# Patient Record
Sex: FEMALE | Race: WHITE | NOT HISPANIC OR LATINO | Employment: FULL TIME | URBAN - METROPOLITAN AREA
[De-identification: names, ages, dates, MRNs, and addresses within clinical notes are randomized per-mention and may not be internally consistent; named-entity substitution may affect disease eponyms.]

---

## 2017-01-19 RX ORDER — RANITIDINE 150 MG/1
CAPSULE ORAL
Qty: 180 CAPSULE | Refills: 1 | Status: SHIPPED | OUTPATIENT
Start: 2017-01-19 | End: 2020-03-04

## 2017-02-23 RX ORDER — DICLOFENAC SODIUM 75 MG/1
TABLET, DELAYED RELEASE ORAL
Qty: 90 TABLET | Refills: 0 | Status: SHIPPED | OUTPATIENT
Start: 2017-02-23 | End: 2017-04-26 | Stop reason: SDUPTHER

## 2017-03-15 RX ORDER — CYCLOBENZAPRINE HCL 10 MG
TABLET ORAL
Qty: 270 TABLET | Refills: 1 | Status: SHIPPED | OUTPATIENT
Start: 2017-03-15 | End: 2019-06-20

## 2017-03-17 RX ORDER — BUPROPION HYDROCHLORIDE 300 MG/1
TABLET ORAL
Qty: 90 TABLET | Refills: 1 | Status: SHIPPED | OUTPATIENT
Start: 2017-03-17 | End: 2019-12-11 | Stop reason: SDUPTHER

## 2017-04-19 RX ORDER — DICLOFENAC SODIUM 75 MG/1
TABLET, DELAYED RELEASE ORAL
Qty: 90 TABLET | Refills: 0 | OUTPATIENT
Start: 2017-04-19

## 2017-04-26 RX ORDER — DICLOFENAC SODIUM 75 MG/1
TABLET, DELAYED RELEASE ORAL
Qty: 90 TABLET | Refills: 0 | Status: SHIPPED | OUTPATIENT
Start: 2017-04-26 | End: 2019-07-10

## 2017-06-01 RX ORDER — CYCLOBENZAPRINE HCL 10 MG
TABLET ORAL
Qty: 270 TABLET | Refills: 1 | OUTPATIENT
Start: 2017-06-01

## 2017-06-01 RX ORDER — BUPROPION HYDROCHLORIDE 300 MG/1
TABLET ORAL
Qty: 90 TABLET | Refills: 1 | OUTPATIENT
Start: 2017-06-01

## 2017-06-02 RX ORDER — BUPROPION HYDROCHLORIDE 300 MG/1
TABLET ORAL
Qty: 90 TABLET | Refills: 1 | OUTPATIENT
Start: 2017-06-02

## 2017-06-06 RX ORDER — RANITIDINE 150 MG/1
CAPSULE ORAL
Qty: 180 CAPSULE | Refills: 1 | OUTPATIENT
Start: 2017-06-06

## 2017-06-07 ENCOUNTER — HOSPITAL ENCOUNTER (OUTPATIENT)
Dept: CARDIOLOGY | Facility: HOSPITAL | Age: 67
Discharge: HOME | End: 2017-06-07
Attending: PSYCHIATRY & NEUROLOGY | Admitting: PSYCHIATRY & NEUROLOGY

## 2017-06-07 LAB
ALBUMIN SERPL-MCNC: 3.6 G/DL (ref 3.5–4.8)
ALBUMIN/GLOB SERPL: 1.1 {RATIO} (ref 1–1.7)
ALP SERPL-CCNC: 70 IU/L (ref 32–91)
ALT SERPL-CCNC: 22 IU/L (ref 14–54)
ANION GAP SERPL CALC-SCNC: 15.5 MMOL/L (ref 10–20)
AST SERPL-CCNC: 30 IU/L (ref 15–41)
BASOPHILS # BLD AUTO: 0 10*3/UL (ref 0–0.2)
BASOPHILS NFR BLD AUTO: 1 % (ref 0–2)
BILIRUB SERPL-MCNC: 0.4 MG/DL (ref 0.3–1.2)
BUN SERPL-MCNC: 16 MG/DL (ref 8–20)
BUN/CREAT SERPL: 13.3 (ref 5.4–26.2)
CALCIUM SERPL-MCNC: 9.1 MG/DL (ref 8.9–10.3)
CHLORIDE SERPL-SCNC: 111 MMOL/L (ref 101–111)
CONV CO2: 20 MMOL/L (ref 22–32)
CONV TOTAL PROTEIN: 6.8 G/DL (ref 6.1–7.9)
CREAT UR-MCNC: 1.2 MG/DL (ref 0.4–1)
DIFFERENTIAL METHOD BLD: (no result)
EOSINOPHIL # BLD AUTO: 0.1 10*3/UL (ref 0–0.3)
EOSINOPHIL # BLD AUTO: 1 % (ref 0–3)
ERYTHROCYTE [DISTWIDTH] IN BLOOD BY AUTOMATED COUNT: 14.8 % (ref 11.5–14.5)
ERYTHROCYTE [SEDIMENTATION RATE] IN BLOOD BY WESTERGREN METHOD: 16 MM/HR (ref 0–30)
GLOBULIN UR ELPH-MCNC: 3.2 G/DL (ref 2.5–3.8)
GLUCOSE SERPL-MCNC: 99 MG/DL (ref 65–99)
HCT VFR BLD AUTO: 42.1 % (ref 35–49)
HGB BLD-MCNC: 14.1 G/DL (ref 12–15)
LYMPHOCYTES # BLD AUTO: 2.8 10*3/UL (ref 0.8–4.8)
LYMPHOCYTES NFR BLD AUTO: 29 % (ref 18–42)
MCH RBC QN AUTO: 30.1 PG (ref 26–32)
MCHC RBC AUTO-ENTMCNC: 33.4 G/DL (ref 32–36)
MCV RBC AUTO: 90.2 FL (ref 80–94)
MONOCYTES # BLD AUTO: 1.1 10*3/UL (ref 0.1–1.3)
MONOCYTES NFR BLD AUTO: 12 % (ref 2–11)
NEUTROPHILS # BLD AUTO: 5.8 10*3/UL (ref 2.3–8.6)
NEUTROPHILS NFR BLD AUTO: 57 % (ref 50–75)
NRBC BLD AUTO-RTO: 0 /100{WBCS}
NRBC/RBC NFR BLD MANUAL: 0 10*3/UL
PLATELET # BLD AUTO: 271 10*3/UL (ref 150–450)
PMV BLD AUTO: 10.8 FL (ref 7.4–10.4)
POTASSIUM SERPL-SCNC: 3.5 MMOL/L (ref 3.6–5.1)
RBC # BLD AUTO: 4.67 10*6/UL (ref 4–5.4)
SODIUM SERPL-SCNC: 143 MMOL/L (ref 136–144)
T4 FREE SERPL-MCNC: 0.98 NG/DL (ref 0.58–1.64)
TSH SERPL-ACNC: 1.6 UIU/ML (ref 0.34–5.6)
VIT B12 SERPL-MCNC: 190 PG/ML (ref 180–914)
WBC # BLD AUTO: 9.9 10*3/UL (ref 4.5–11.5)

## 2017-06-09 LAB
ANA SER QL IA: NORMAL

## 2017-06-14 ENCOUNTER — HOSPITAL ENCOUNTER (OUTPATIENT)
Dept: FAMILY MEDICINE CLINIC | Facility: CLINIC | Age: 67
Setting detail: SPECIMEN
Discharge: HOME | End: 2017-06-14
Attending: FAMILY MEDICINE | Admitting: FAMILY MEDICINE

## 2017-06-14 LAB — POTASSIUM SERPL-SCNC: 3.9 MMOL/L (ref 3.6–5.1)

## 2017-06-20 ENCOUNTER — HOSPITAL ENCOUNTER (OUTPATIENT)
Dept: RESPIRATORY THERAPY | Facility: HOSPITAL | Age: 67
Discharge: HOME | End: 2017-06-20
Attending: FAMILY MEDICINE | Admitting: FAMILY MEDICINE

## 2017-08-17 ENCOUNTER — HOSPITAL ENCOUNTER (OUTPATIENT)
Dept: FAMILY MEDICINE CLINIC | Facility: CLINIC | Age: 67
Setting detail: SPECIMEN
Discharge: HOME | End: 2017-08-17
Attending: FAMILY MEDICINE | Admitting: FAMILY MEDICINE

## 2017-08-17 LAB
ALBUMIN SERPL-MCNC: 4 G/DL (ref 3.5–4.8)
ALBUMIN/GLOB SERPL: 1 {RATIO} (ref 1–1.7)
ALP SERPL-CCNC: 126 IU/L (ref 32–91)
ALT SERPL-CCNC: 33 IU/L (ref 14–54)
ANION GAP SERPL CALC-SCNC: 17 MMOL/L (ref 10–20)
AST SERPL-CCNC: 33 IU/L (ref 15–41)
BASOPHILS # BLD AUTO: 0 10*3/UL (ref 0–0.2)
BASOPHILS NFR BLD AUTO: 0 % (ref 0–2)
BILIRUB SERPL-MCNC: 0.7 MG/DL (ref 0.3–1.2)
BUN SERPL-MCNC: 19 MG/DL (ref 8–20)
BUN/CREAT SERPL: 17.3 (ref 5.4–26.2)
CALCIUM SERPL-MCNC: 9.4 MG/DL (ref 8.9–10.3)
CHLORIDE SERPL-SCNC: 111 MMOL/L (ref 101–111)
CONV CO2: 19 MMOL/L (ref 22–32)
CONV SMEAR REVIEW: (no result)
CONV TOTAL PROTEIN: 7.9 G/DL (ref 6.1–7.9)
CREAT UR-MCNC: 1.1 MG/DL (ref 0.4–1)
DIFFERENTIAL METHOD BLD: (no result)
EOSINOPHIL # BLD AUTO: 0 % (ref 0–3)
EOSINOPHIL # BLD AUTO: 0 10*3/UL (ref 0–0.3)
ERYTHROCYTE [DISTWIDTH] IN BLOOD BY AUTOMATED COUNT: 15 % (ref 11.5–14.5)
GLOBULIN UR ELPH-MCNC: 3.9 G/DL (ref 2.5–3.8)
GLUCOSE SERPL-MCNC: 132 MG/DL (ref 65–99)
HCT VFR BLD AUTO: 43.3 % (ref 35–49)
HGB BLD-MCNC: 14 G/DL (ref 12–15)
LYMPHOCYTES # BLD AUTO: 1.4 10*3/UL (ref 0.8–4.8)
LYMPHOCYTES NFR BLD AUTO: 14 % (ref 18–42)
MCH RBC QN AUTO: 28.7 PG (ref 26–32)
MCHC RBC AUTO-ENTMCNC: 32.3 G/DL (ref 32–36)
MCV RBC AUTO: 88.7 FL (ref 80–94)
MONOCYTES # BLD AUTO: 0.4 10*3/UL (ref 0.1–1.3)
MONOCYTES NFR BLD AUTO: 4 % (ref 2–11)
NEUTROPHILS # BLD AUTO: 8 10*3/UL (ref 2.3–8.6)
NEUTROPHILS NFR BLD AUTO: 82 % (ref 50–75)
NRBC BLD AUTO-RTO: 0 /100{WBCS}
PLATELET # BLD AUTO: 332 10*3/UL (ref 150–450)
PMV BLD AUTO: 10.9 FL (ref 7.4–10.4)
POTASSIUM SERPL-SCNC: 4 MMOL/L (ref 3.6–5.1)
RBC # BLD AUTO: 4.88 10*6/UL (ref 4–5.4)
SODIUM SERPL-SCNC: 143 MMOL/L (ref 136–144)
WBC # BLD AUTO: 9.8 10*3/UL (ref 4.5–11.5)

## 2018-02-08 ENCOUNTER — HOSPITAL ENCOUNTER (OUTPATIENT)
Dept: FAMILY MEDICINE CLINIC | Facility: CLINIC | Age: 68
Setting detail: SPECIMEN
Discharge: HOME | End: 2018-02-08
Attending: FAMILY MEDICINE | Admitting: FAMILY MEDICINE

## 2018-02-08 LAB — URATE SERPL-MCNC: 5.7 MG/DL (ref 2.6–8)

## 2018-02-13 ENCOUNTER — HOSPITAL ENCOUNTER (OUTPATIENT)
Dept: PHYSICAL THERAPY | Facility: HOSPITAL | Age: 68
Setting detail: RECURRING SERIES
Discharge: HOME | End: 2018-04-17
Attending: ORTHOPAEDIC SURGERY | Admitting: ORTHOPAEDIC SURGERY

## 2018-03-22 ENCOUNTER — HOSPITAL ENCOUNTER (OUTPATIENT)
Dept: FAMILY MEDICINE CLINIC | Facility: CLINIC | Age: 68
Setting detail: SPECIMEN
Discharge: HOME | End: 2018-03-22
Attending: FAMILY MEDICINE | Admitting: FAMILY MEDICINE

## 2018-04-16 ENCOUNTER — HOSPITAL ENCOUNTER (OUTPATIENT)
Dept: FAMILY MEDICINE CLINIC | Facility: CLINIC | Age: 68
Setting detail: SPECIMEN
Discharge: HOME | End: 2018-04-16
Attending: FAMILY MEDICINE | Admitting: FAMILY MEDICINE

## 2018-04-16 LAB
ANION GAP SERPL CALC-SCNC: 11.8 MMOL/L (ref 10–20)
BUN SERPL-MCNC: 7 MG/DL (ref 8–20)
BUN/CREAT SERPL: 7.8 (ref 5.4–26.2)
CALCIUM SERPL-MCNC: 9 MG/DL (ref 8.9–10.3)
CHLORIDE SERPL-SCNC: 109 MMOL/L (ref 101–111)
CONV CO2: 21 MMOL/L (ref 22–32)
CREAT UR-MCNC: 0.9 MG/DL (ref 0.4–1)
GLUCOSE SERPL-MCNC: 76 MG/DL (ref 65–99)
POTASSIUM SERPL-SCNC: ABNORMAL MMOL/L (ref 3.6–5.1)
SODIUM SERPL-SCNC: 138 MMOL/L (ref 136–144)

## 2018-04-25 ENCOUNTER — HOSPITAL ENCOUNTER (OUTPATIENT)
Dept: OTHER | Facility: HOSPITAL | Age: 68
Setting detail: RECURRING SERIES
Discharge: HOME | End: 2018-04-29
Attending: UROLOGY | Admitting: UROLOGY

## 2018-04-30 ENCOUNTER — HOSPITAL ENCOUNTER (OUTPATIENT)
Dept: OTHER | Facility: HOSPITAL | Age: 68
Setting detail: RECURRING SERIES
Discharge: HOME | End: 2018-05-04
Attending: UROLOGY | Admitting: UROLOGY

## 2018-05-03 ENCOUNTER — HOSPITAL ENCOUNTER (OUTPATIENT)
Dept: CT IMAGING | Facility: HOSPITAL | Age: 68
Discharge: HOME | End: 2018-05-03
Attending: UROLOGY | Admitting: UROLOGY

## 2018-05-14 ENCOUNTER — HOSPITAL ENCOUNTER (OUTPATIENT)
Dept: PHYSICAL THERAPY | Facility: HOSPITAL | Age: 68
Setting detail: RECURRING SERIES
Discharge: HOME | End: 2018-07-12
Attending: FAMILY MEDICINE | Admitting: FAMILY MEDICINE

## 2018-11-14 ENCOUNTER — HOSPITAL ENCOUNTER (OUTPATIENT)
Dept: FAMILY MEDICINE CLINIC | Facility: CLINIC | Age: 68
Setting detail: SPECIMEN
Discharge: HOME | End: 2018-11-14
Attending: FAMILY MEDICINE | Admitting: FAMILY MEDICINE

## 2018-11-14 LAB
BASOPHILS # BLD AUTO: 0.1 10*3/UL (ref 0–0.2)
BASOPHILS NFR BLD AUTO: 1 % (ref 0–2)
DIFFERENTIAL METHOD BLD: (no result)
EOSINOPHIL # BLD AUTO: 0.2 10*3/UL (ref 0–0.3)
EOSINOPHIL # BLD AUTO: 2 % (ref 0–3)
ERYTHROCYTE [DISTWIDTH] IN BLOOD BY AUTOMATED COUNT: 13.5 % (ref 11.5–14.5)
HCT VFR BLD AUTO: 43 % (ref 35–49)
HGB BLD-MCNC: 14.3 G/DL (ref 12–15)
LYMPHOCYTES # BLD AUTO: 2.6 10*3/UL (ref 0.8–4.8)
LYMPHOCYTES NFR BLD AUTO: 25 % (ref 18–42)
MCH RBC QN AUTO: 30.3 PG (ref 26–32)
MCHC RBC AUTO-ENTMCNC: 33.1 G/DL (ref 32–36)
MCV RBC AUTO: 91.5 FL (ref 80–94)
MONOCYTES # BLD AUTO: 1.2 10*3/UL (ref 0.1–1.3)
MONOCYTES NFR BLD AUTO: 11 % (ref 2–11)
NEUTROPHILS # BLD AUTO: 6.6 10*3/UL (ref 2.3–8.6)
NEUTROPHILS NFR BLD AUTO: 61 % (ref 50–75)
NRBC BLD AUTO-RTO: 0 /100{WBCS}
NRBC/RBC NFR BLD MANUAL: 0 10*3/UL
PLATELET # BLD AUTO: 298 10*3/UL (ref 150–450)
PMV BLD AUTO: 10.1 FL (ref 7.4–10.4)
RBC # BLD AUTO: 4.7 10*6/UL (ref 4–5.4)
WBC # BLD AUTO: 10.7 10*3/UL (ref 4.5–11.5)

## 2019-03-12 ENCOUNTER — HOSPITAL ENCOUNTER (OUTPATIENT)
Dept: PHYSICAL THERAPY | Facility: HOSPITAL | Age: 69
Setting detail: RECURRING SERIES
Discharge: HOME | End: 2019-04-24
Attending: PODIATRIST | Admitting: PODIATRIST

## 2019-06-12 ENCOUNTER — HOSPITAL ENCOUNTER (OUTPATIENT)
Dept: FAMILY MEDICINE CLINIC | Facility: CLINIC | Age: 69
Setting detail: SPECIMEN
Discharge: HOME | End: 2019-06-12
Attending: FAMILY MEDICINE | Admitting: FAMILY MEDICINE

## 2019-06-12 ENCOUNTER — CONVERSION ENCOUNTER (OUTPATIENT)
Dept: FAMILY MEDICINE CLINIC | Facility: CLINIC | Age: 69
End: 2019-06-12

## 2019-06-13 LAB
25(OH)D3 SERPL-MCNC: 47 NG/ML (ref 30–100)
ALBUMIN SERPL-MCNC: 4.3 G/DL (ref 3.5–4.8)
ALBUMIN/GLOB SERPL: 1.4 {RATIO} (ref 1–1.7)
ALP SERPL-CCNC: 77 IU/L (ref 32–91)
ALT SERPL-CCNC: 17 IU/L (ref 14–54)
ANION GAP SERPL CALC-SCNC: 16.2 MMOL/L (ref 10–20)
AST SERPL-CCNC: 25 IU/L (ref 15–41)
BILIRUB SERPL-MCNC: 1.1 MG/DL (ref 0.3–1.2)
BILIRUB UR QL STRIP: NEGATIVE MG/DL
BUN SERPL-MCNC: 11 MG/DL (ref 8–20)
BUN/CREAT SERPL: 11 (ref 5.4–26.2)
CALCIUM SERPL-MCNC: 9.2 MG/DL (ref 8.9–10.3)
CASTS URNS QL MICRO: ABNORMAL /[LPF]
CHLORIDE SERPL-SCNC: 105 MMOL/L (ref 101–111)
CHOLEST SERPL-MCNC: 260 MG/DL
CHOLEST/HDLC SERPL: 3.6 {RATIO}
CK SERPL-CCNC: 139 IU/L (ref 38–234)
COLOR UR: YELLOW
CONV BACTERIA IN URINE MICRO: ABNORMAL
CONV CLARITY OF URINE: CLEAR
CONV CO2: 20 MMOL/L (ref 22–32)
CONV HYALINE CASTS IN URINE MICRO: 0 /[LPF] (ref 0–5)
CONV LDL CHOLESTEROL DIRECT: 185 MG/DL (ref 0–100)
CONV PROTEIN IN URINE BY AUTOMATED TEST STRIP: NEGATIVE MG/DL
CONV SMALL ROUND CELLS: ABNORMAL /[HPF]
CONV TOTAL PROTEIN: 7.3 G/DL (ref 6.1–7.9)
CONV UROBILINOGEN IN URINE BY AUTOMATED TEST STRIP: 0.2 MG/DL
CREAT UR-MCNC: 1 MG/DL (ref 0.4–1)
CULTURE INDICATED?: ABNORMAL
GLOBULIN UR ELPH-MCNC: 3 G/DL (ref 2.5–3.8)
GLUCOSE SERPL-MCNC: 79 MG/DL (ref 65–99)
GLUCOSE UR QL: NEGATIVE MG/DL
HDLC SERPL-MCNC: 72 MG/DL
HGB UR QL STRIP: NEGATIVE
KETONES UR QL STRIP: ABNORMAL MG/DL
LDLC/HDLC SERPL: 2.6 {RATIO}
LEUKOCYTE ESTERASE UR QL STRIP: ABNORMAL
LIPID INTERPRETATION: ABNORMAL
NITRITE UR QL STRIP: POSITIVE
PH UR STRIP.AUTO: 5.5 [PH] (ref 4.5–8)
POTASSIUM SERPL-SCNC: 4.2 MMOL/L (ref 3.6–5.1)
RBC #/AREA URNS HPF: 1 /[HPF] (ref 0–3)
SODIUM SERPL-SCNC: 137 MMOL/L (ref 136–144)
SP GR UR: 1.02 (ref 1–1.03)
SPERM URNS QL MICRO: ABNORMAL /[HPF]
SQUAMOUS SPT QL MICRO: 0 /[HPF] (ref 0–5)
TRIGL SERPL-MCNC: 43 MG/DL
TSH SERPL-ACNC: 0.96 UIU/ML (ref 0.34–5.6)
UNIDENT CRYS URNS QL MICRO: ABNORMAL /[HPF]
VLDLC SERPL CALC-MCNC: 3.2 MG/DL
WBC #/AREA URNS HPF: 2 /[HPF] (ref 0–5)
YEAST SPEC QL WET PREP: ABNORMAL /[HPF]

## 2019-06-14 LAB
AMPICILLIN SUSC ISLT: NORMAL
AZTREONAM SUSC ISLT: NORMAL
BACTERIA ISLT: NORMAL
BACTERIA SPEC AEROBE CULT: NORMAL
CEFAZOLIN SUSC ISLT: NORMAL
CEFEPIME SUSC ISLT: NORMAL
CEFTAZIDIME SUSC ISLT: NORMAL
CEFTRIAXONE SUSC ISLT: NORMAL
CIPROFLOXACIN SUSC ISLT: NORMAL
COLONY COUNT: NORMAL
ERTAPENEM SUSC ISLT: NORMAL
LEVOFLOXACIN SUSC ISLT: NORMAL
Lab: NORMAL
MEROPENEM SUSC ISLT: NORMAL
MICRO REPORT STATUS: NORMAL
NITROFURANTOIN SUSC ISLT: NORMAL
PIP+TAZO SUSC ISLT: NORMAL
SPECIMEN SOURCE: NORMAL
SUSC METH SPEC: NORMAL
TETRACYCLINE SUSC ISLT: NORMAL
TOBRAMYCIN SUSC ISLT: NORMAL
TRIMETHOPRIM/SULFA: NORMAL

## 2019-06-16 VITALS
HEIGHT: 63 IN | WEIGHT: 155.6 LBS | RESPIRATION RATE: 12 BRPM | BODY MASS INDEX: 27.57 KG/M2 | SYSTOLIC BLOOD PRESSURE: 106 MMHG | OXYGEN SATURATION: 99 % | HEART RATE: 78 BPM | DIASTOLIC BLOOD PRESSURE: 70 MMHG

## 2019-06-17 NOTE — PROGRESS NOTES
Vital Signs:    Patient Profile:    69 Years Old Female  Height:     63 inches  Weight:     155.6 pounds  BMI:        27.56     O2 Sat:     99 %  Temp:       98.2 degrees F oral  Pulse rate: 78 / minute  Resp:       12 per minute  BP Sittin / 70  (right arm)    Cuff size:  regular      Problems: Active problems were reviewed with the patient during this visit.  Medications: Medications were reviewed with the patient during this visit.  Allergies: Allergies were reviewed with the patient during this visit.        Vitals Entered By: Yaneth Maldonado CMA (2019 4:32 PM)      Referring Provider:  Sadiq Parker MD  Primary Provider:  Sadiq Parker MD    CC:  Muscle spasms.    History of Present Illness:  1) patient c/o muscle spasms in lower legs, goes into feet. Wakes up in middle of night several times. Spasms go into arches of feet, toes. Unable to sleep. (+) Severe. Taking K+ for 2 months without help. Drinks a lot of water.      Past Medical History:     Reviewed history from 2018 and no changes required:        Dermatologic Hx: herpes, (simplex)        GI Hx: gerd        Immunologic Hx: autoimmune disease        Musculoskeletal Hx: degenerative disc disease L/S spine        HEENT Hx: migraines        Cardiovascular Hx: hyperlipidemia        Musculoskeletal Hx: fibromyalgia        Respiratory Hx: allergies        Musculoskeletal Hx: carpal tunnel syndrome        Psychiatric Hx: depression,        C. Diff colitis- hospitalized         Obesity        Pulmonary hypertension-echo  with pressure 30-40- sleep test neg for SALVADOR                Dr Medina - gyn        Dr Lou- back surgeon    Past Surgical History:     Reviewed history from 2017 and no changes required:        cholecystectomy        tubal ligation bilateral        Colonoscopy- [facility]-         Epidurals x 3        L4-S1 fusion and laminotomy (2017)        L3-L4 fusion (2017)    Family History Summary:       Reviewed history Last on 2019 and no changes required:2019  Niece - Has FH Diabetes - Entered On: 2017  Nephew - Has FH Diabetes - Entered On: 2017  Daughter - Has FH Diabetes - Entered On: 2017  Father - Has FH Other Cancer - stomach - Entered On: 2017  Mother - Has FH Hypertension - Entered On: 2017  MGM - Has FH Heart Disease - Entered On: 2017  Mother - Has FH Breast Cancer - Entered On: 2017    General Comments - FH:  Mother -  age 83.  Also breast cancer  Father -  stomach cancer- age 65    Social History:     Reviewed history from 2018 and no changes required:        Patient has never smoked.        Passive Smoke: N        Alcohol Use: N        Drug Use: N        HIV/High Risk: N        Regular Exercise: Y        Hx Domestic Abuse: N        Amish Affecting Care: N                Risk Factors:     Smoked Tobacco Use:  Never smoker  Smokeless Tobacco Use:  Never  Passive smoke exposure:  no  Drug use:  no  HIV high-risk behavior:  no  Caffeine use:  2 drinks per day  Alcohol use:  no  Exercise:  yes     Times per week:  7     Type of Exercise:  walks 1.5 miles twice daily  Seatbelt use:  100 %  Sun Exposure:  frequently    Family History Risk Factors:     Family History of MI in females < 65 years old:  no     Family History of MI in males < 55 years old:  no    Previous Tobacco Use: Signed On 2019  Smoked Tobacco Use:  Never smoker  Smokeless Tobacco Use:  Never  Passive smoke exposure:  no  Drug use:  no  HIV high-risk behavior:  no  Caffeine use:  2 drinks per day    Previous Alcohol Use: Signed On 2019  Alcohol use:  no  Exercise:  yes     Times per week:  7     Type of Exercise:  walks 1.5 miles twice daily  Seatbelt use:  100 %  Sun Exposure:  frequently    Family History Risk Factors:     Family History of MI in females < 65 years old:  no     Family History of MI in males < 55 years old:  no    Mammogram History:      Date of Last Mammogram:  05/01/2015      Review of Systems          The patient complains of fatigue, back pain, joint pain and muscle cramps.  The patient denies fever, chills, diplopia, chest pain, palpitations, syncope, dyspnea on exertion, dyspnea at rest, abdominal pain, melena, hematochezia and jaundice.        Physical Exam   Height:  63  Weight:  158  BP:  106/70 mm HG    Medication List:  ZANAFLEX 4 MG ORAL TABLET (TIZANIDINE HCL) 1 po q hs  POTASSIUM CHLORIDE JARROD ER 20 MEQ ORAL TABLET EXTENDED RELEASE (POTASSIUM CHLORIDE JARROD ER) one twice daily  QUETIAPINE FUMARATE 50 MG TABLET (QUETIAPINE FUMARATE) TAKE 1 TABLET AT BEDTIME  AMBIEN 5 MG ORAL TABLET (ZOLPIDEM TARTRATE) TAKE ONE EVERY NIGHT AT BEDTIME AS NEEDED BY MOUTH  BUPROPION HCL  MG TABLET EXTENDED RELEASE 24 HOUR (BUPROPION HCL) TAKE 1 TABLET EVERY DAY  CALCIUM 600+D TABLET (CALCIUM CARB-CHOLECALCIFEROL TABS) Take 1 tablet by mouth daily  ASPIRIN 81 MG ORAL TABLET DELAYED RELEASE (ASPIRIN) take 1 tablet once daily      Surgical History   cholecystectomy  tubal ligation bilateral  Colonoscopy- [facility]- 2013  Epidurals x 3  L4-S1 fusion and laminotomy (6/26/2017)  L3-L4 fusion (8/2017),    Risk Factors  Tobacco Use: Never smoker  Passive smoke exposure: no  Exercise: yes  Exercise: 7 times per week  Type of Exercise: walks 1.5 miles twice daily  Illicit Drug use: no      Physical Examination   General Appearance   In no acute distress.  Alert & oriented.  Behavior and affect appropriate to situation  Neck   Supple.  No adenopathy  Cardiovascular   Regular rate and rhythm  Lungs   Clear to auscultation  Musculoskeletal   Gait and station normal, with adequate muscle strength and tone.  Normal ROM to neck, back and extremities  Neurologic   Cranial nerves 2-12 grossly intact.  DTRs normal and symmetric.  Extremity sensation normal and symmetrical to light touch  Psych   Alert and cooperative; normal mood and affect; normal attention span  and concentration        Impression & Recommendations:    Problem # 1:  MUSCLE CRAMPS (ICD-729.82) (FUA18-H42.2)  Assessment: Deteriorated    Orders:  [facility] CK TOTAL (CKT)  [facility] COMPREHENSIVE METABOLIC PANEL (CMP) (MPC)      Medications Added to Medication List This Visit:  1)  Zanaflex 4 Mg Oral Tablet (Tizanidine hcl) .... 1 po q hs      Patient Instructions:  1)  During this office visit we discussed the pertinent aspects of the visit and the treatment recommendations.  Patient was given the opportunity to ask questions and discuss other concerns.  2)  Check labs: CMP, CK.  3)  Zanaflex 4mg po qhs.  4)  Refilled Ambien.  5)  Consider Quinine.  6)  Increase water/stretching.  7)  follow up prn or next scheduedl appt     ...................................................................Flory Hyatt MA  June 14, 2019 9:05 AM                Medication Administration    Orders Added:  1)  Ofc Vst, Est Level III [17542]  2)  [facility] CK TOTAL [CKT]  3)  [facility] COMPREHENSIVE METABOLIC PANEL (CMP) [MPC]          Medications:  AMBIEN 5 MG ORAL TABLET (ZOLPIDEM TARTRATE) TAKE ONE EVERY NIGHT AT BEDTIME AS NEEDED BY MOUTH  #30 x 5      Entered and Authorized by:  Sadiq Parker MD      Electronically signed by:   Sadiq Parker MD on 06/12/2019      Method used:    Print then Give to Patient      RxID:   [id number]  ZANAFLEX 4 MG ORAL TABLET (TIZANIDINE HCL) 1 po q hs  #30[Tablet] x 5      Entered and Authorized by:  Sadiq Parker MD      Electronically signed by:   Sadiq Parker MD on 06/12/2019      Method used:    Print then Give to Patient      Note to Pharmacy: Route: ORAL;       RxID:   [id number]          Electronically signed by Sadiq Parker MD on 06/16/2019 at 8:29 PM  ________________________________________________________________________       Disclaimer: Converted Note message may not contain all data elements that existed in the legacy source system. Please see [facility] System for  the original note details.

## 2019-06-20 ENCOUNTER — TELEPHONE (OUTPATIENT)
Dept: FAMILY MEDICINE CLINIC | Facility: CLINIC | Age: 69
End: 2019-06-20

## 2019-06-20 RX ORDER — TIZANIDINE 4 MG/1
TABLET ORAL
Qty: 60 TABLET | Refills: 11 | Status: SHIPPED | OUTPATIENT
Start: 2019-06-20 | End: 2019-07-10

## 2019-07-10 ENCOUNTER — OFFICE VISIT (OUTPATIENT)
Dept: PODIATRY | Facility: CLINIC | Age: 69
End: 2019-07-10

## 2019-07-10 VITALS
BODY MASS INDEX: 27.82 KG/M2 | HEART RATE: 81 BPM | SYSTOLIC BLOOD PRESSURE: 119 MMHG | WEIGHT: 157 LBS | HEIGHT: 63 IN | DIASTOLIC BLOOD PRESSURE: 82 MMHG

## 2019-07-10 DIAGNOSIS — M72.2 PLANTAR FASCIITIS: Primary | ICD-10-CM

## 2019-07-10 PROCEDURE — 99213 OFFICE O/P EST LOW 20 MIN: CPT | Performed by: PODIATRIST

## 2019-07-10 RX ORDER — PSEUDOEPHEDRINE HCL 30 MG
100 TABLET ORAL
COMMUNITY
Start: 2017-08-25

## 2019-07-10 RX ORDER — BACLOFEN 10 MG/1
TABLET ORAL 3 TIMES DAILY
COMMUNITY
Start: 2017-06-15 | End: 2020-03-04 | Stop reason: SDUPTHER

## 2019-07-10 NOTE — PROGRESS NOTES
07/10/2019  Foot and Ankle Surgery - Established Patient/Follow-up  Provider: Dr. Reginald Ro, DAJUAN  Location: [facility]    Subjective:  Krystyna Fabian is a 69 y.o. female.     Chief Complaint   Patient presents with   • Right Foot - Follow-up       HPI: Patient returns for follow-up after recent steroid injection.  She states that she is doing much better.  She has much less discomfort to the heel and arch region.  She denies any other issues today.    Allergies   Allergen Reactions   • Butorphanol Itching     stadol   • Erythromycin Other (See Comments)     CAUSES HYPERTENSION         Current Outpatient Medications on File Prior to Visit   Medication Sig Dispense Refill   • baclofen (LIORESAL) 10 MG tablet 3 (Three) Times a Day.     • buPROPion XL (WELLBUTRIN XL) 300 MG 24 hr tablet TAKE 1 TABLET EVERY MORNING 90 tablet 1   • Cyanocobalamin (B-12 COMPLIANCE INJECTION) 1000 MCG/ML kit 1,000 mcg.     • docusate sodium 100 MG capsule Take 100 mg by mouth.     • QUEtiapine (SEROquel) 50 MG tablet Take 1 tablet by mouth every night. 90 tablet 3   • ranitidine (ZANTAC) 150 MG capsule TAKE 1 CAPSULE TWICE DAILY 180 capsule 1   • VITAMIN D, CHOLECALCIFEROL, PO Take 1 tablet by mouth Daily.     • zolpidem (AMBIEN) 5 MG tablet Take 1 tablet by mouth At Night As Needed for sleep. 30 tablet 0   • [DISCONTINUED] diclofenac (VOLTAREN) 75 MG EC tablet TAKE 1 TABLET EVERY DAY 90 tablet 0   • [DISCONTINUED] LYRICA 200 MG capsule Take 1 capsule by mouth 2 (Two) Times a Day. 60 capsule 0   • [DISCONTINUED] MIMVEY 1-0.5 MG per tablet TAKE 1 TABLET EVERY DAY 84 tablet 1   • [DISCONTINUED] nitrofurantoin, macrocrystal-monohydrate, (MACROBID) 100 MG capsule Take 1 capsule by mouth 2 (two) times a day. 14 capsule 0   • [DISCONTINUED] tiZANidine (ZANAFLEX) 4 MG tablet 2 po q hs 60 tablet 11     No current facility-administered medications on file prior to visit.        Objective   /82   Pulse 81   Ht 160 cm  "(63\")   Wt 71.2 kg (157 lb)   BMI 27.81 kg/m²     Podiatry Exam       General Appearance:  well nourished; well hydrated; no acute distress  Mental Status Exam        Judgement and Insight:  Intact       Orientation:  Oriented to time, place, and person  Cardiovascular (Right)       Dorsalis Pedis Pulse (Rt):  2/4       Posterior Tibialis Pulse (Rt):  2/4       Capillary Filling Time (Rt):  1-3 Seconds       Edema (Rt):  No Edema  Dermatological Exam       Temperature:  warm to warm       Skin Elasticity:  normal skin elasticity  Neurological Exam (Right)       Paresthesia (Rt):  negative       Achilles DTRs (Rt):  symmetric       Tinel over Tarsal Tunnel (Rt):  negative     MusculoSkeletal Exam (Right)       Gait and Stance (Rt):   mildly antalgic       ROM (Rt):   no pain or limitation with range of motion of the first metatarsophalangeal joint.  Other joints are unremarkable       Muscle Strength (Rt):  symmetrical 5/5; mild guarding       Subluxed Digits (Rt):  no subluxation or laxity of joints       Dislocated Joints (Rt):  no dislocation of joints       Inflammed Joints (Rt):  no inflammation of joints       Hammertoe Deformity (Rt):  none       Claw Toe Deformity (Rt):  none       Medial Turbicle Calc (Rt):   Mild discomfort with palpation       Gastroc soleus equinus (Rt):  Inability to dorsiflex past neutral position both straight legged and bent knee       Post tibial tendon (Rt):  no soreness noted       Peroneal Tendon (Rt):  no soreness noted       Capsulitis of the MPJs (Rt):  no soreness noted    Assessment/Plan     Krystyna was seen today for follow-up.    Diagnoses and all orders for this visit:    Plantar fasciitis      Patient has noticed improvement after last steroid injection.  I have asked that she continue to wear the arch supports and perform stretching exercises on a daily basis.  I would like her to monitor her symptoms closely and call with any increased pain or limitation.  I will see " her in 2 months for reevaluation.  If she continues to have prominent discomfort, may need to consider endoscopic plantar fasciotomy for definitive management.    No orders of the defined types were placed in this encounter.           Note is dictated utilizing voice recognition software. Unfortunately this leads to occasional typographical errors. I apologize in advance if the situation occurs. If questions occur please do not hesitate to call our office.

## 2019-08-28 DIAGNOSIS — F51.01 PRIMARY INSOMNIA: ICD-10-CM

## 2019-08-28 RX ORDER — QUETIAPINE FUMARATE 50 MG/1
TABLET, FILM COATED ORAL
Qty: 90 TABLET | Refills: 1 | Status: SHIPPED | OUTPATIENT
Start: 2019-08-28 | End: 2020-04-03

## 2019-08-28 RX ORDER — RANITIDINE 150 MG/1
TABLET ORAL
Qty: 180 TABLET | Refills: 3 | Status: SHIPPED | OUTPATIENT
Start: 2019-08-28 | End: 2020-03-04 | Stop reason: SDUPTHER

## 2019-12-07 DIAGNOSIS — F51.01 PRIMARY INSOMNIA: Primary | ICD-10-CM

## 2019-12-09 RX ORDER — ZOLPIDEM TARTRATE 5 MG/1
TABLET ORAL
Qty: 30 TABLET | Refills: 2 | Status: SHIPPED | OUTPATIENT
Start: 2019-12-09 | End: 2020-03-02 | Stop reason: SDUPTHER

## 2019-12-10 ENCOUNTER — TELEPHONE (OUTPATIENT)
Dept: FAMILY MEDICINE CLINIC | Facility: CLINIC | Age: 69
End: 2019-12-10

## 2019-12-12 RX ORDER — BUPROPION HYDROCHLORIDE 300 MG/1
TABLET ORAL
Qty: 90 TABLET | Refills: 0 | Status: SHIPPED | OUTPATIENT
Start: 2019-12-12 | End: 2020-04-03

## 2020-03-02 DIAGNOSIS — F51.01 PRIMARY INSOMNIA: ICD-10-CM

## 2020-03-02 RX ORDER — ZOLPIDEM TARTRATE 5 MG/1
5 TABLET ORAL NIGHTLY PRN
Qty: 30 TABLET | Refills: 0 | Status: SHIPPED | OUTPATIENT
Start: 2020-03-02 | End: 2020-03-04 | Stop reason: SDUPTHER

## 2020-03-04 ENCOUNTER — TELEPHONE (OUTPATIENT)
Dept: FAMILY MEDICINE CLINIC | Facility: CLINIC | Age: 70
End: 2020-03-04

## 2020-03-04 ENCOUNTER — HOSPITAL ENCOUNTER (OUTPATIENT)
Dept: CARDIOLOGY | Facility: HOSPITAL | Age: 70
Discharge: HOME | End: 2020-03-04
Admitting: FAMILY MEDICINE

## 2020-03-04 ENCOUNTER — OFFICE VISIT (OUTPATIENT)
Dept: FAMILY MEDICINE CLINIC | Facility: CLINIC | Age: 70
End: 2020-03-04

## 2020-03-04 VITALS
DIASTOLIC BLOOD PRESSURE: 77 MMHG | SYSTOLIC BLOOD PRESSURE: 115 MMHG | WEIGHT: 156 LBS | HEART RATE: 85 BPM | BODY MASS INDEX: 27.63 KG/M2 | RESPIRATION RATE: 12 BRPM

## 2020-03-04 DIAGNOSIS — J01.00 ACUTE NON-RECURRENT MAXILLARY SINUSITIS: ICD-10-CM

## 2020-03-04 DIAGNOSIS — F51.01 PRIMARY INSOMNIA: ICD-10-CM

## 2020-03-04 DIAGNOSIS — K21.9 LARYNGOPHARYNGEAL REFLUX: ICD-10-CM

## 2020-03-04 DIAGNOSIS — R60.9 EDEMA, UNSPECIFIED TYPE: ICD-10-CM

## 2020-03-04 DIAGNOSIS — Z12.31 BREAST CANCER SCREENING BY MAMMOGRAM: ICD-10-CM

## 2020-03-04 DIAGNOSIS — R60.9 EDEMA, UNSPECIFIED TYPE: Primary | ICD-10-CM

## 2020-03-04 DIAGNOSIS — J30.89 NON-SEASONAL ALLERGIC RHINITIS DUE TO OTHER ALLERGIC TRIGGER: ICD-10-CM

## 2020-03-04 DIAGNOSIS — R53.83 FATIGUE, UNSPECIFIED TYPE: ICD-10-CM

## 2020-03-04 LAB
ALBUMIN SERPL-MCNC: 4.3 G/DL (ref 3.5–5.2)
ALBUMIN/GLOB SERPL: 1.7 G/DL
ALP SERPL-CCNC: 72 U/L (ref 39–117)
ALT SERPL W P-5'-P-CCNC: 15 U/L (ref 1–33)
ANION GAP SERPL CALCULATED.3IONS-SCNC: 14.9 MMOL/L (ref 5–15)
AST SERPL-CCNC: 22 U/L (ref 1–32)
BACTERIA UR QL AUTO: ABNORMAL /HPF
BASOPHILS # BLD AUTO: 0.05 10*3/MM3 (ref 0–0.2)
BASOPHILS NFR BLD AUTO: 0.5 % (ref 0–1.5)
BH CV LOWER VASCULAR LEFT COMMON FEMORAL AUGMENT: NORMAL
BH CV LOWER VASCULAR LEFT COMMON FEMORAL COMPETENT: NORMAL
BH CV LOWER VASCULAR LEFT COMMON FEMORAL COMPRESS: NORMAL
BH CV LOWER VASCULAR LEFT COMMON FEMORAL PHASIC: NORMAL
BH CV LOWER VASCULAR LEFT COMMON FEMORAL SPONT: NORMAL
BH CV LOWER VASCULAR LEFT DISTAL FEMORAL COMPRESS: NORMAL
BH CV LOWER VASCULAR LEFT GASTRONEMIUS COMPRESS: NORMAL
BH CV LOWER VASCULAR LEFT GREATER SAPH AK COMPRESS: NORMAL
BH CV LOWER VASCULAR LEFT GREATER SAPH BK COMPRESS: NORMAL
BH CV LOWER VASCULAR LEFT LESSER SAPH COMPRESS: NORMAL
BH CV LOWER VASCULAR LEFT MID FEMORAL AUGMENT: NORMAL
BH CV LOWER VASCULAR LEFT MID FEMORAL COMPETENT: NORMAL
BH CV LOWER VASCULAR LEFT MID FEMORAL COMPRESS: NORMAL
BH CV LOWER VASCULAR LEFT MID FEMORAL PHASIC: NORMAL
BH CV LOWER VASCULAR LEFT MID FEMORAL SPONT: NORMAL
BH CV LOWER VASCULAR LEFT PERONEAL COMPRESS: NORMAL
BH CV LOWER VASCULAR LEFT POPLITEAL AUGMENT: NORMAL
BH CV LOWER VASCULAR LEFT POPLITEAL COMPETENT: NORMAL
BH CV LOWER VASCULAR LEFT POPLITEAL COMPRESS: NORMAL
BH CV LOWER VASCULAR LEFT POPLITEAL PHASIC: NORMAL
BH CV LOWER VASCULAR LEFT POPLITEAL SPONT: NORMAL
BH CV LOWER VASCULAR LEFT POSTERIOR TIBIAL COMPRESS: NORMAL
BH CV LOWER VASCULAR LEFT PROXIMAL FEMORAL COMPRESS: NORMAL
BH CV LOWER VASCULAR LEFT SAPHENOFEMORAL JUNCTION AUGMENT: NORMAL
BH CV LOWER VASCULAR LEFT SAPHENOFEMORAL JUNCTION COMPETENT: NORMAL
BH CV LOWER VASCULAR LEFT SAPHENOFEMORAL JUNCTION COMPRESS: NORMAL
BH CV LOWER VASCULAR LEFT SAPHENOFEMORAL JUNCTION PHASIC: NORMAL
BH CV LOWER VASCULAR LEFT SAPHENOFEMORAL JUNCTION SPONT: NORMAL
BH CV LOWER VASCULAR RIGHT COMMON FEMORAL AUGMENT: NORMAL
BH CV LOWER VASCULAR RIGHT COMMON FEMORAL COMPETENT: NORMAL
BH CV LOWER VASCULAR RIGHT COMMON FEMORAL COMPRESS: NORMAL
BH CV LOWER VASCULAR RIGHT COMMON FEMORAL PHASIC: NORMAL
BH CV LOWER VASCULAR RIGHT COMMON FEMORAL SPONT: NORMAL
BH CV LOWER VASCULAR RIGHT DISTAL FEMORAL COMPRESS: NORMAL
BH CV LOWER VASCULAR RIGHT GASTRONEMIUS COMPRESS: NORMAL
BH CV LOWER VASCULAR RIGHT GREATER SAPH AK COMPRESS: NORMAL
BH CV LOWER VASCULAR RIGHT GREATER SAPH BK COMPRESS: NORMAL
BH CV LOWER VASCULAR RIGHT LESSER SAPH COMPRESS: NORMAL
BH CV LOWER VASCULAR RIGHT MID FEMORAL AUGMENT: NORMAL
BH CV LOWER VASCULAR RIGHT MID FEMORAL COMPETENT: NORMAL
BH CV LOWER VASCULAR RIGHT MID FEMORAL COMPRESS: NORMAL
BH CV LOWER VASCULAR RIGHT MID FEMORAL PHASIC: NORMAL
BH CV LOWER VASCULAR RIGHT MID FEMORAL SPONT: NORMAL
BH CV LOWER VASCULAR RIGHT PERONEAL COMPRESS: NORMAL
BH CV LOWER VASCULAR RIGHT POPLITEAL AUGMENT: NORMAL
BH CV LOWER VASCULAR RIGHT POPLITEAL COMPETENT: NORMAL
BH CV LOWER VASCULAR RIGHT POPLITEAL COMPRESS: NORMAL
BH CV LOWER VASCULAR RIGHT POPLITEAL PHASIC: NORMAL
BH CV LOWER VASCULAR RIGHT POPLITEAL SPONT: NORMAL
BH CV LOWER VASCULAR RIGHT POSTERIOR TIBIAL COMPRESS: NORMAL
BH CV LOWER VASCULAR RIGHT PROXIMAL FEMORAL COMPRESS: NORMAL
BH CV LOWER VASCULAR RIGHT SAPHENOFEMORAL JUNCTION AUGMENT: NORMAL
BH CV LOWER VASCULAR RIGHT SAPHENOFEMORAL JUNCTION COMPETENT: NORMAL
BH CV LOWER VASCULAR RIGHT SAPHENOFEMORAL JUNCTION COMPRESS: NORMAL
BH CV LOWER VASCULAR RIGHT SAPHENOFEMORAL JUNCTION PHASIC: NORMAL
BH CV LOWER VASCULAR RIGHT SAPHENOFEMORAL JUNCTION SPONT: NORMAL
BILIRUB SERPL-MCNC: 0.3 MG/DL (ref 0.2–1.2)
BILIRUB UR QL STRIP: NEGATIVE
BUN BLD-MCNC: 6 MG/DL (ref 8–23)
BUN/CREAT SERPL: 7.2 (ref 7–25)
CALCIUM SPEC-SCNC: 9.4 MG/DL (ref 8.6–10.5)
CHLORIDE SERPL-SCNC: 104 MMOL/L (ref 98–107)
CLARITY UR: ABNORMAL
CO2 SERPL-SCNC: 25.1 MMOL/L (ref 22–29)
COLOR UR: YELLOW
CREAT BLD-MCNC: 0.83 MG/DL (ref 0.57–1)
DEPRECATED RDW RBC AUTO: 42.1 FL (ref 37–54)
EOSINOPHIL # BLD AUTO: 0.09 10*3/MM3 (ref 0–0.4)
EOSINOPHIL NFR BLD AUTO: 0.9 % (ref 0.3–6.2)
ERYTHROCYTE [DISTWIDTH] IN BLOOD BY AUTOMATED COUNT: 12.7 % (ref 12.3–15.4)
GFR SERPL CREATININE-BSD FRML MDRD: 68 ML/MIN/1.73
GLOBULIN UR ELPH-MCNC: 2.6 GM/DL
GLUCOSE BLD-MCNC: 82 MG/DL (ref 65–99)
GLUCOSE UR STRIP-MCNC: NEGATIVE MG/DL
HCT VFR BLD AUTO: 41.1 % (ref 34–46.6)
HGB BLD-MCNC: 13.4 G/DL (ref 12–15.9)
HGB UR QL STRIP.AUTO: ABNORMAL
HYALINE CASTS UR QL AUTO: ABNORMAL /LPF
IMM GRANULOCYTES # BLD AUTO: 0.06 10*3/MM3 (ref 0–0.05)
IMM GRANULOCYTES NFR BLD AUTO: 0.6 % (ref 0–0.5)
KETONES UR QL STRIP: NEGATIVE
LEUKOCYTE ESTERASE UR QL STRIP.AUTO: ABNORMAL
LYMPHOCYTES # BLD AUTO: 2.17 10*3/MM3 (ref 0.7–3.1)
LYMPHOCYTES NFR BLD AUTO: 22.6 % (ref 19.6–45.3)
MCH RBC QN AUTO: 29.7 PG (ref 26.6–33)
MCHC RBC AUTO-ENTMCNC: 32.6 G/DL (ref 31.5–35.7)
MCV RBC AUTO: 91.1 FL (ref 79–97)
MONOCYTES # BLD AUTO: 0.96 10*3/MM3 (ref 0.1–0.9)
MONOCYTES NFR BLD AUTO: 10 % (ref 5–12)
NEUTROPHILS # BLD AUTO: 6.29 10*3/MM3 (ref 1.7–7)
NEUTROPHILS NFR BLD AUTO: 65.4 % (ref 42.7–76)
NITRITE UR QL STRIP: NEGATIVE
NRBC BLD AUTO-RTO: 0 /100 WBC (ref 0–0.2)
PH UR STRIP.AUTO: 6.5 [PH] (ref 5–8)
PLATELET # BLD AUTO: 294 10*3/MM3 (ref 140–450)
PMV BLD AUTO: 11 FL (ref 6–12)
POTASSIUM BLD-SCNC: 3.4 MMOL/L (ref 3.5–5.2)
PROT SERPL-MCNC: 6.9 G/DL (ref 6–8.5)
PROT UR QL STRIP: NEGATIVE
RBC # BLD AUTO: 4.51 10*6/MM3 (ref 3.77–5.28)
RBC # UR: ABNORMAL /HPF
REF LAB TEST METHOD: ABNORMAL
SODIUM BLD-SCNC: 144 MMOL/L (ref 136–145)
SP GR UR STRIP: 1.01 (ref 1–1.03)
SQUAMOUS #/AREA URNS HPF: ABNORMAL /HPF
TSH SERPL DL<=0.05 MIU/L-ACNC: 2.22 UIU/ML (ref 0.27–4.2)
UROBILINOGEN UR QL STRIP: ABNORMAL
WBC NRBC COR # BLD: 9.62 10*3/MM3 (ref 3.4–10.8)
WBC UR QL AUTO: ABNORMAL /HPF

## 2020-03-04 PROCEDURE — 80053 COMPREHEN METABOLIC PANEL: CPT | Performed by: FAMILY MEDICINE

## 2020-03-04 PROCEDURE — 99214 OFFICE O/P EST MOD 30 MIN: CPT | Performed by: FAMILY MEDICINE

## 2020-03-04 PROCEDURE — 85025 COMPLETE CBC W/AUTO DIFF WBC: CPT | Performed by: FAMILY MEDICINE

## 2020-03-04 PROCEDURE — 93970 EXTREMITY STUDY: CPT

## 2020-03-04 PROCEDURE — 87186 SC STD MICRODIL/AGAR DIL: CPT | Performed by: FAMILY MEDICINE

## 2020-03-04 PROCEDURE — 81001 URINALYSIS AUTO W/SCOPE: CPT | Performed by: FAMILY MEDICINE

## 2020-03-04 PROCEDURE — 87086 URINE CULTURE/COLONY COUNT: CPT | Performed by: FAMILY MEDICINE

## 2020-03-04 PROCEDURE — 84443 ASSAY THYROID STIM HORMONE: CPT | Performed by: FAMILY MEDICINE

## 2020-03-04 RX ORDER — AMOXICILLIN 875 MG/1
875 TABLET, COATED ORAL 2 TIMES DAILY
Qty: 14 TABLET | Refills: 0 | Status: SHIPPED | OUTPATIENT
Start: 2020-03-04 | End: 2020-04-01

## 2020-03-04 RX ORDER — OMEPRAZOLE 40 MG/1
40 CAPSULE, DELAYED RELEASE ORAL DAILY
Qty: 90 CAPSULE | Refills: 3 | Status: SHIPPED | OUTPATIENT
Start: 2020-03-04 | End: 2020-04-01 | Stop reason: SDUPTHER

## 2020-03-04 RX ORDER — POTASSIUM CHLORIDE 750 MG/1
TABLET, FILM COATED, EXTENDED RELEASE ORAL
Qty: 60 TABLET | Refills: 1 | Status: SHIPPED | OUTPATIENT
Start: 2020-03-04 | End: 2021-05-11 | Stop reason: SDUPTHER

## 2020-03-04 RX ORDER — TIZANIDINE 4 MG/1
TABLET ORAL
COMMUNITY
Start: 2020-02-05 | End: 2020-05-26

## 2020-03-04 RX ORDER — FUROSEMIDE 40 MG/1
40 TABLET ORAL DAILY PRN
Qty: 30 TABLET | Refills: 1 | Status: SHIPPED | OUTPATIENT
Start: 2020-03-04 | End: 2021-04-13 | Stop reason: SDUPTHER

## 2020-03-04 RX ORDER — TRIAMCINOLONE ACETONIDE 55 UG/1
2 SPRAY, METERED NASAL DAILY
Qty: 16.5 G | Refills: 11 | Status: SHIPPED | OUTPATIENT
Start: 2020-03-04 | End: 2020-10-29

## 2020-03-04 RX ORDER — ZOLPIDEM TARTRATE 5 MG/1
5 TABLET ORAL NIGHTLY PRN
Qty: 30 TABLET | Refills: 5 | Status: SHIPPED | OUTPATIENT
Start: 2020-03-04 | End: 2020-04-02

## 2020-03-04 RX ORDER — FUROSEMIDE 40 MG/1
40 TABLET ORAL DAILY PRN
Qty: 30 TABLET | Refills: 1 | Status: SHIPPED | OUTPATIENT
Start: 2020-03-04 | End: 2020-03-04 | Stop reason: SDUPTHER

## 2020-03-04 RX ORDER — TRIAMCINOLONE ACETONIDE 40 MG/ML
80 INJECTION, SUSPENSION INTRA-ARTICULAR; INTRAMUSCULAR ONCE
Status: DISCONTINUED | OUTPATIENT
Start: 2020-03-04 | End: 2022-04-14

## 2020-03-04 NOTE — PROGRESS NOTES
Rooming Tab(CC,VS,Pt Hx,Fall Screen)  Chief Complaint   Patient presents with   • Leg Swelling       Subjective Patient is a 70-year-old female here complaining of her feet and leg swelling for the last month.  She has no complaints of orthopnea or PND.  She has had no increase in her salt intake to.  She has had no history of sleep apnea.  She had an echocardiogram done in 2017 that showed a normal ejection fraction but a pulmonary elevated pulmonary pressures of 30 to 40 mmHg.  Patient has a lot of pressure in her face and forehead with headaches.  She had no fever.  She is had nosebleeds out of the right nares.  Her nose has been congested for 3 months.  She is taken Sinutab without help.  Patient complains of a chronic cough and aching in her throat.  She has a history of GERD and she is coughing up clear mucus.  She does not have any heartburn.  She complains of chronic insomnia and takes Ambien 5 mg nightly and Seroquel 50 mg nightly which helps a lot  Patient has B12 deficiency and takes B12 thousand micrograms every morning    I have reviewed and updated her medications, medical history and problem list during today's office visit.     Patient Care Team:  Sadiq Parker MD as PCP - General (Family Medicine)    Problem List Tab  Medications Tab  Synopsis Tab  Chart Review Tab  Care Everywhere Tab  Immunizations Tab  Patient History Tab    Social History     Tobacco Use   • Smoking status: Never Smoker   • Smokeless tobacco: Never Used   Substance Use Topics   • Alcohol use: No       Review of Systems   Constitutional: Negative for chills, fatigue and fever.   HENT: Positive for congestion and rhinorrhea. Negative for nosebleeds.    Eyes: Negative for double vision.   Respiratory: Negative for chest tightness and shortness of breath.    Cardiovascular: Positive for leg swelling. Negative for chest pain and palpitations.   Gastrointestinal: Negative for blood in stool.   Genitourinary: Negative for dysuria  and hematuria.   Neurological: Negative for dizziness and syncope.   Psychiatric/Behavioral: Negative for depressed mood.       Objective     Rooming Tab(CC,VS,Pt Hx,Fall Screen)  /77 (BP Location: Left arm, Patient Position: Sitting, Cuff Size: Adult)   Pulse 85   Resp 12   Wt 70.8 kg (156 lb)   BMI 27.63 kg/m²     Body mass index is 27.63 kg/m².    Physical Exam   Constitutional: She is oriented to person, place, and time. She appears well-developed and well-nourished. No distress.   HENT:   Head: Normocephalic and atraumatic.   Congested nose, throat TMs are clear  Tender maxillary sinuses   Eyes: Pupils are equal, round, and reactive to light. Conjunctivae, EOM and lids are normal.   Neck: Trachea normal and normal range of motion. Neck supple. No JVD present. Carotid bruit is not present. No thyroid mass and no thyromegaly present.   No carotid bruits   Cardiovascular: Normal rate, regular rhythm, normal heart sounds and intact distal pulses.   Pulmonary/Chest: Effort normal and breath sounds normal.   Musculoskeletal:   1+ edema in her lower legs   Neurological: She is alert and oriented to person, place, and time. No cranial nerve deficit.   Skin: Skin is warm and dry.   Psychiatric: She has a normal mood and affect. Her speech is normal and behavior is normal. She is attentive.   Nursing note and vitals reviewed.       Statin Choice Calculator  Data Reviewed:               Lab Results   Component Value Date    BUN 6 (L) 03/04/2020    CREATININE 0.83 03/04/2020    EGFRIFNONA 68 03/04/2020     03/04/2020    K 3.4 (L) 03/04/2020     03/04/2020    CALCIUM 9.4 03/04/2020    ALBUMIN 4.30 03/04/2020    BILITOT 0.3 03/04/2020    ALKPHOS 72 03/04/2020    AST 22 03/04/2020    ALT 15 03/04/2020    WBC 9.62 03/04/2020    RBC 4.51 03/04/2020    HCT 41.1 03/04/2020    MCV 91.1 03/04/2020    MCH 29.7 03/04/2020    TSH 2.220 03/04/2020      Assessment/Plan   Order Review Tab  Health Maintenance  Tab  Patient Plan/Order Tab  Diagnoses and all orders for this visit:    1. Edema, unspecified type (Primary)  Assessment & Plan:  Leg elevation, knee-high compression socks 15 to 20 mmHg  Lasix 40 mg daily as needed, if she takes it consistently will need to have a BMP    Orders:  -     Comprehensive Metabolic Panel  -     TSH  -     Urinalysis With Culture If Indicated - Urine, Clean Catch  -     Cancel: US Venous Doppler Lower Extremity Bilateral (duplex)  -     Duplex Venous Lower Extremity - Bilateral CAR; Future  -     Urinalysis, Microscopic Only - Urine, Clean Catch; Future  -     Urinalysis, Microscopic Only - Urine, Clean Catch  -     Urine Culture - Urine,; Future  -     Urine Culture - Urine, Urine, Clean Catch    2. Primary insomnia  Assessment & Plan:  Continue Ambien 5 mg nightly as needed    Orders:  -     zolpidem (AMBIEN) 5 MG tablet; Take 1 tablet by mouth At Night As Needed for Sleep.  Dispense: 30 tablet; Refill: 5    3. Fatigue, unspecified type  -     CBC & Differential  -     CBC Auto Differential    4. Acute non-recurrent maxillary sinusitis  Assessment & Plan:  Worsening.  Kenalog 80 mg IM.  Nasacort AQ 2 sprays each nostril daily.  Arygel to nasal twice daily and as needed  Amoxicillin 875 mg twice daily for 7 days  With her history of C. difficile colitis she will need to take a probiotic    Orders:  -     triamcinolone acetonide (KENALOG-40) injection 80 mg    5. Laryngopharyngeal reflux  Assessment & Plan:  GERD diet discussed.  Omeprazole 40 mg daily      6. Non-seasonal allergic rhinitis due to other allergic trigger  Assessment & Plan:  Nasacort AQ 2 sprays each nostril daily      Other orders  -     Discontinue: furosemide (LASIX) 40 MG tablet; Take 1 tablet by mouth Daily As Needed (edema).  Dispense: 30 tablet; Refill: 1  -     amoxicillin (AMOXIL) 875 MG tablet; Take 1 tablet by mouth 2 (Two) Times a Day.  Dispense: 14 tablet; Refill: 0  -     Triamcinolone Acetonide (NASACORT)  55 MCG/ACT nasal inhaler; 2 sprays into the nostril(s) as directed by provider Daily.  Dispense: 16.5 g; Refill: 11  -     omeprazole (PrilOSEC) 40 MG capsule; Take 1 capsule by mouth Daily.  Dispense: 90 capsule; Refill: 3      Wrapup Tab  Return in about 4 weeks (around 4/1/2020) for Recheck.

## 2020-03-04 NOTE — TELEPHONE ENCOUNTER
PATIENT CALLED IN STATING THAT AFTER HER APPT TODAY SHE WENT TO THE PHARMACY TO  HER PRESCRIPTIONS. SHE RECEIVED ALL OF THEM BUT THE WATER PILL THE DR HAD STARTED HER ON TODAY. SHE WAS UNSURE OF THE  NAME OF THE MEDICATION.    REECE WAS CONFIRMED    PATIENT CAN BE CALLED BE BACK AT  [phone]

## 2020-03-04 NOTE — TELEPHONE ENCOUNTER
Jyothi with [facility] called to tell you that patient was NEGATIVE for DVT's and SVT's.  They let patient go.

## 2020-03-05 PROBLEM — J30.89 NON-SEASONAL ALLERGIC RHINITIS: Status: ACTIVE | Noted: 2020-03-05

## 2020-03-05 PROBLEM — J01.00 ACUTE NON-RECURRENT MAXILLARY SINUSITIS: Status: ACTIVE | Noted: 2020-03-05

## 2020-03-05 PROBLEM — K21.9 LARYNGOPHARYNGEAL REFLUX: Status: ACTIVE | Noted: 2020-03-05

## 2020-03-05 PROBLEM — R60.9 EDEMA: Status: ACTIVE | Noted: 2020-03-05

## 2020-03-05 NOTE — ASSESSMENT & PLAN NOTE
Leg elevation, knee-high compression socks 15 to 20 mmHg  Lasix 40 mg daily as needed, if she takes it consistently will need to have a BMP

## 2020-03-05 NOTE — ASSESSMENT & PLAN NOTE
Worsening.  Kenalog 80 mg IM.  Nasacort AQ 2 sprays each nostril daily.  Arygel to nasal twice daily and as needed  Amoxicillin 875 mg twice daily for 7 days  With her history of C. difficile colitis she will need to take a probiotic

## 2020-03-06 LAB — BACTERIA SPEC AEROBE CULT: ABNORMAL

## 2020-04-01 ENCOUNTER — OFFICE VISIT (OUTPATIENT)
Dept: FAMILY MEDICINE CLINIC | Facility: CLINIC | Age: 70
End: 2020-04-01

## 2020-04-01 VITALS
DIASTOLIC BLOOD PRESSURE: 82 MMHG | SYSTOLIC BLOOD PRESSURE: 130 MMHG | RESPIRATION RATE: 8 BRPM | HEIGHT: 63 IN | TEMPERATURE: 97.7 F | OXYGEN SATURATION: 98 % | WEIGHT: 150 LBS | BODY MASS INDEX: 26.58 KG/M2 | HEART RATE: 80 BPM

## 2020-04-01 DIAGNOSIS — Z12.31 BREAST CANCER SCREENING BY MAMMOGRAM: ICD-10-CM

## 2020-04-01 DIAGNOSIS — R04.0 FREQUENT NOSEBLEEDS: ICD-10-CM

## 2020-04-01 DIAGNOSIS — K21.9 LARYNGOPHARYNGEAL REFLUX: ICD-10-CM

## 2020-04-01 DIAGNOSIS — N30.00 ACUTE CYSTITIS WITHOUT HEMATURIA: Primary | ICD-10-CM

## 2020-04-01 LAB
BACTERIA UR QL AUTO: ABNORMAL /HPF
BILIRUB UR QL STRIP: NEGATIVE
CLARITY UR: CLEAR
COLOR UR: YELLOW
GLUCOSE UR STRIP-MCNC: NEGATIVE MG/DL
HGB UR QL STRIP.AUTO: NEGATIVE
HYALINE CASTS UR QL AUTO: ABNORMAL /LPF
KETONES UR QL STRIP: NEGATIVE
LEUKOCYTE ESTERASE UR QL STRIP.AUTO: ABNORMAL
NITRITE UR QL STRIP: NEGATIVE
PH UR STRIP.AUTO: 7 [PH] (ref 5–8)
PROT UR QL STRIP: NEGATIVE
RBC # UR: ABNORMAL /HPF
REF LAB TEST METHOD: ABNORMAL
SP GR UR STRIP: 1.01 (ref 1–1.03)
SQUAMOUS #/AREA URNS HPF: ABNORMAL /HPF
UROBILINOGEN UR QL STRIP: ABNORMAL
WBC UR QL AUTO: ABNORMAL /HPF

## 2020-04-01 PROCEDURE — 99213 OFFICE O/P EST LOW 20 MIN: CPT | Performed by: FAMILY MEDICINE

## 2020-04-01 PROCEDURE — 81001 URINALYSIS AUTO W/SCOPE: CPT | Performed by: FAMILY MEDICINE

## 2020-04-01 PROCEDURE — 87086 URINE CULTURE/COLONY COUNT: CPT | Performed by: FAMILY MEDICINE

## 2020-04-01 RX ORDER — OMEPRAZOLE 40 MG/1
40 CAPSULE, DELAYED RELEASE ORAL 2 TIMES DAILY
Qty: 60 CAPSULE | Refills: 11 | Status: SHIPPED | OUTPATIENT
Start: 2020-04-01 | End: 2020-08-19 | Stop reason: SDUPTHER

## 2020-04-01 NOTE — PROGRESS NOTES
Rooming Tab(CC,VS,Pt Hx,Fall Screen)  Chief Complaint   Patient presents with   • Follow-up     1 month       Subjective 70-year-old here for follow-up of her edema.  The patient states that the Lasix truly helps her edema significantly.  She only has to take it as needed not every day.  Patient has had a acute urinary tract infection with E. coli and we treated it with amoxicillin and she wants to recheck this.  The patient sinus congestion is improved with antibiotics and medication.  She continues to still have nosebleeds on both sides.  She is using arygel and keeping her nose moist and try not to pick at it.  She also continues to have a thickening sensation in her throat and clearing her throat a lot.  She does not have a lot of runny nose or congested nose.  She denies any heartburn    I have reviewed and updated her medications, medical history and problem list during today's office visit.     Patient Care Team:  Sadiq Parker MD as PCP - General (Family Medicine)    Problem List Tab  Medications Tab  Synopsis Tab  Chart Review Tab  Care Everywhere Tab  Immunizations Tab  Patient History Tab    Social History     Tobacco Use   • Smoking status: Never Smoker   • Smokeless tobacco: Never Used   Substance Use Topics   • Alcohol use: No       Review of Systems   Constitutional: Negative for chills, fatigue and fever.   HENT: Negative for nosebleeds.    Eyes: Negative for double vision.   Respiratory: Negative for chest tightness and shortness of breath.    Cardiovascular: Negative for chest pain and palpitations.   Gastrointestinal: Negative for blood in stool.   Genitourinary: Negative for dysuria and hematuria.   Neurological: Negative for dizziness and syncope.   Psychiatric/Behavioral: Negative for depressed mood.       Objective     Rooming Tab(CC,VS,Pt Hx,Fall Screen)  /82 (BP Location: Left arm, Patient Position: Sitting, Cuff Size: Adult)   Pulse 80   Temp 97.7 °F (36.5 °C) (Oral)   Resp 8   " Ht 160 cm (63\")   Wt 68 kg (150 lb)   SpO2 98%   BMI 26.57 kg/m²     Body mass index is 26.57 kg/m².    Physical Exam   Constitutional: She is oriented to person, place, and time. She appears well-developed and well-nourished. No distress.   HENT:   Head: Normocephalic and atraumatic.   Minimal nasal congestion.  She still has irritation of both of her nares and the anterior septum   Eyes: Pupils are equal, round, and reactive to light. Conjunctivae, EOM and lids are normal.   Neck: Trachea normal and normal range of motion. Neck supple. No JVD present. Carotid bruit is not present. No thyroid mass and no thyromegaly present.   No carotid bruits   Cardiovascular: Normal rate, regular rhythm, normal heart sounds and intact distal pulses.   Pulmonary/Chest: Effort normal and breath sounds normal.   Musculoskeletal:   No c/c/e   Neurological: She is alert and oriented to person, place, and time. No cranial nerve deficit.   Skin: Skin is warm and dry.   Psychiatric: She has a normal mood and affect. Her speech is normal and behavior is normal. She is attentive.   Nursing note and vitals reviewed.       Statin Choice Calculator  Data Reviewed:               Lab Results   Component Value Date    BUN 6 (L) 03/04/2020    CREATININE 0.83 03/04/2020    EGFRIFNONA 68 03/04/2020     03/04/2020    K 3.4 (L) 03/04/2020     03/04/2020    CALCIUM 9.4 03/04/2020    ALBUMIN 4.30 03/04/2020    BILITOT 0.3 03/04/2020    ALKPHOS 72 03/04/2020    AST 22 03/04/2020    ALT 15 03/04/2020    WBC 9.62 03/04/2020    RBC 4.51 03/04/2020    HCT 41.1 03/04/2020    MCV 91.1 03/04/2020    MCH 29.7 03/04/2020    TSH 2.220 03/04/2020      Assessment/Plan   Order Review Tab  Health Maintenance Tab  Patient Plan/Order Tab  Diagnoses and all orders for this visit:    1. Acute cystitis without hematuria (Primary)  Assessment & Plan:  Resolved, symptom-free, repeat UA    Orders:  -     Urinalysis With Culture If Indicated - Urine, Clean " Catch  -     Urinalysis, Microscopic Only - Urine, Clean Catch; Future  -     Urinalysis, Microscopic Only - Urine, Clean Catch  -     Urine Culture - Urine,; Future  -     Urine Culture - Urine, Urine, Clean Catch    2. Breast cancer screening by mammogram  -     Mammo Screening Digital Tomosynthesis Bilateral With CAD; Future    3. Laryngopharyngeal reflux  Assessment & Plan:  Increase omeprazole to 40 mg twice daily.  Would like otolaryngology to do a naso-laryngoscope    Orders:  -     Ambulatory Referral to ENT (Otolaryngology)    4. Frequent nosebleeds  Assessment & Plan:  Pt request ent , will have see  Continue Arygel twice daily      Other orders  -     omeprazole (PrilOSEC) 40 MG capsule; Take 1 capsule by mouth 2 (Two) Times a Day.  Dispense: 60 capsule; Refill: 11      Wrapup Tab  Return in about 3 months (around 7/14/2020) for Annual physical.

## 2020-04-02 DIAGNOSIS — F51.01 PRIMARY INSOMNIA: ICD-10-CM

## 2020-04-02 RX ORDER — ZOLPIDEM TARTRATE 5 MG/1
TABLET ORAL
Qty: 30 TABLET | Refills: 3 | Status: SHIPPED | OUTPATIENT
Start: 2020-04-02 | End: 2020-10-15

## 2020-04-03 DIAGNOSIS — F51.01 PRIMARY INSOMNIA: ICD-10-CM

## 2020-04-03 LAB — BACTERIA SPEC AEROBE CULT: NO GROWTH

## 2020-04-03 RX ORDER — BUPROPION HYDROCHLORIDE 300 MG/1
TABLET ORAL
Qty: 90 TABLET | Refills: 0 | Status: SHIPPED | OUTPATIENT
Start: 2020-04-03 | End: 2020-08-19 | Stop reason: SDUPTHER

## 2020-04-03 RX ORDER — QUETIAPINE FUMARATE 50 MG/1
TABLET, FILM COATED ORAL
Qty: 90 TABLET | Refills: 1 | Status: SHIPPED | OUTPATIENT
Start: 2020-04-03 | End: 2020-10-23

## 2020-05-26 RX ORDER — TIZANIDINE 4 MG/1
TABLET ORAL
Qty: 60 TABLET | Refills: 10 | Status: SHIPPED | OUTPATIENT
Start: 2020-05-26 | End: 2020-08-19 | Stop reason: SDUPTHER

## 2020-06-04 DIAGNOSIS — Z12.31 BREAST CANCER SCREENING BY MAMMOGRAM: ICD-10-CM

## 2020-06-08 DIAGNOSIS — R92.8 ABNORMAL MAMMOGRAM: Primary | ICD-10-CM

## 2020-06-09 DIAGNOSIS — R92.8 ABNORMAL MAMMOGRAM: ICD-10-CM

## 2020-08-19 RX ORDER — TIZANIDINE 4 MG/1
8 TABLET ORAL NIGHTLY
Qty: 180 TABLET | Refills: 0 | Status: SHIPPED | OUTPATIENT
Start: 2020-08-19 | End: 2021-05-06 | Stop reason: SDUPTHER

## 2020-08-19 RX ORDER — OMEPRAZOLE 40 MG/1
40 CAPSULE, DELAYED RELEASE ORAL 2 TIMES DAILY
Qty: 180 CAPSULE | Refills: 1 | Status: SHIPPED | OUTPATIENT
Start: 2020-08-19 | End: 2021-05-06 | Stop reason: SDUPTHER

## 2020-08-19 RX ORDER — BUPROPION HYDROCHLORIDE 300 MG/1
300 TABLET ORAL DAILY
Qty: 90 TABLET | Refills: 1 | Status: SHIPPED | OUTPATIENT
Start: 2020-08-19 | End: 2021-03-12 | Stop reason: SDUPTHER

## 2020-10-15 DIAGNOSIS — F51.01 PRIMARY INSOMNIA: ICD-10-CM

## 2020-10-15 RX ORDER — ZOLPIDEM TARTRATE 5 MG/1
TABLET ORAL
Qty: 30 TABLET | Refills: 2 | Status: SHIPPED | OUTPATIENT
Start: 2020-10-15 | End: 2021-01-04

## 2020-10-23 DIAGNOSIS — F51.01 PRIMARY INSOMNIA: ICD-10-CM

## 2020-10-23 RX ORDER — QUETIAPINE FUMARATE 50 MG/1
TABLET, FILM COATED ORAL
Qty: 90 TABLET | Refills: 1 | Status: SHIPPED | OUTPATIENT
Start: 2020-10-23 | End: 2021-05-06 | Stop reason: SDUPTHER

## 2020-10-29 ENCOUNTER — OFFICE VISIT (OUTPATIENT)
Dept: PODIATRY | Facility: CLINIC | Age: 70
End: 2020-10-29

## 2020-10-29 VITALS
WEIGHT: 139 LBS | DIASTOLIC BLOOD PRESSURE: 70 MMHG | SYSTOLIC BLOOD PRESSURE: 105 MMHG | HEIGHT: 63 IN | BODY MASS INDEX: 24.63 KG/M2 | HEART RATE: 77 BPM

## 2020-10-29 DIAGNOSIS — M72.2 PLANTAR FASCIITIS: Primary | ICD-10-CM

## 2020-10-29 PROCEDURE — 99213 OFFICE O/P EST LOW 20 MIN: CPT | Performed by: PODIATRIST

## 2020-10-29 NOTE — PROGRESS NOTES
10/29/2020  Foot and Ankle Surgery - Established Patient/Follow-up  Provider: Dr. Reginald Ro DPM  Location: [facility]    Subjective:  Krystyna Fabian is a 70 y.o. female.     Chief Complaint   Patient presents with   • Right Foot - Pain, Follow-up       HPI: Patient returns with prominent pain involving the plantar aspect of the right heel.  She states that the symptoms have been present and gradually progressive over the last 2 months.  She is concerned that the symptoms are becoming more frequent and causing more limitation.  She would like to discuss further treatment options if possible.    Allergies   Allergen Reactions   • Butorphanol Itching     stadol   • Erythromycin Other (See Comments)     CAUSES HYPERTENSION         Current Outpatient Medications on File Prior to Visit   Medication Sig Dispense Refill   • buPROPion XL (WELLBUTRIN XL) 300 MG 24 hr tablet Take 1 tablet by mouth Daily. 90 tablet 1   • Cyanocobalamin (B-12 COMPLIANCE INJECTION) 1000 MCG/ML kit 1,000 mcg.     • docusate sodium 100 MG capsule Take 100 mg by mouth.     • furosemide (LASIX) 40 MG tablet Take 1 tablet by mouth Daily As Needed (edema). 30 tablet 1   • omeprazole (PrilOSEC) 40 MG capsule Take 1 capsule by mouth 2 (Two) Times a Day. 180 capsule 1   • potassium chloride (KLOR-CON) 10 MEQ CR tablet 2 po q d prn when take lasix 60 tablet 1   • QUEtiapine (SEROquel) 50 MG tablet TAKE 1 TABLET AT BEDTIME 90 tablet 1   • tiZANidine (ZANAFLEX) 4 MG tablet Take 2 tablets by mouth Every Night. 180 tablet 0   • VITAMIN D, CHOLECALCIFEROL, PO Take 1 tablet by mouth Daily.     • zolpidem (AMBIEN) 5 MG tablet TAKE ONE TABLET BY MOUTH EVERY NIGHT AT BEDTIME AS NEEDED FOR SLEEP 30 tablet 2   • [DISCONTINUED] Triamcinolone Acetonide (NASACORT) 55 MCG/ACT nasal inhaler 2 sprays into the nostril(s) as directed by provider Daily. 16.5 g 11     Current Facility-Administered Medications on File Prior to Visit   Medication Dose  "Route Frequency Provider Last Rate Last Dose   • triamcinolone acetonide (KENALOG-40) injection 80 mg  80 mg Intramuscular Once Sadiq Parker MD           Objective   /70   Pulse 77   Ht 160 cm (63\")   Wt 63 kg (139 lb)   BMI 24.62 kg/m²     Podiatry Exam       General Appearance:  well nourished; well hydrated; no acute distress  Mental Status Exam        Judgement and Insight:  Intact       Orientation:  Oriented to time, place, and person  Cardiovascular (Right)       Dorsalis Pedis Pulse (Rt):  2/4       Posterior Tibialis Pulse (Rt):  2/4       Capillary Filling Time (Rt):  1-3 Seconds       Edema (Rt):  No Edema  Dermatological Exam       Temperature:  warm to warm       Skin Elasticity:  normal skin elasticity  Neurological Exam (Right)       Paresthesia (Rt):  negative       Achilles DTRs (Rt):  symmetric       Tinel over Tarsal Tunnel (Rt):  negative     MusculoSkeletal Exam (Right)       Gait and Stance (Rt):   mildly antalgic       ROM (Rt):   no pain or limitation with range of motion of the first metatarsophalangeal joint.  Other joints are unremarkable       Muscle Strength (Rt):  symmetrical 5/5; mild guarding       Subluxed Digits (Rt):  no subluxation or laxity of joints       Dislocated Joints (Rt):  no dislocation of joints       Inflammed Joints (Rt):  no inflammation of joints       Hammertoe Deformity (Rt):  none       Claw Toe Deformity (Rt):  none       Medial Turbicle Calc (Rt):  Moderate to severe discomfort with palpation       Gastroc soleus equinus (Rt):  Inability to dorsiflex past neutral position both straight legged and bent knee       Post tibial tendon (Rt):  no soreness noted       Peroneal Tendon (Rt):  no soreness noted       Capsulitis of the MPJs (Rt):  no soreness noted    Assessment/Plan   Diagnoses and all orders for this visit:    1. Plantar fasciitis (Primary)  -     CBC (No Diff); Future  -     Basic Metabolic Panel; Future  -     ECG 12 Lead; Future  -     " XR Chest 2 View; Future  -     ceFAZolin (ANCEF) 2 g in sodium chloride 0.9 % 100 mL IVPB  -     Case Request; Standing  -     Case Request    Other orders  -     Follow Anesthesia Guidelines / Standing Orders; Future  -     Obtain Informed Consent; Future  -     Provide NPO Instructions to Patient; Future  -     Chlorhexidine Skin Prep; Future  -     Follow Anesthesia Guidelines / Standing Orders; Standing  -     Clip Operative Site; Standing  -     Verify / Perform Chlorhexidine Skin Prep; Standing  -     Verify / Perform Chlorhexidine Skin Prep if Indicated (If Not Already Completed); Standing      Patient returns with progressive pain involving the plantar aspect of the right heel.  Her symptoms remain consistent with plantar fasciitis.  We did discuss continued conservative care options including repeat steroid injection.  We also reviewed endoscopic plantar fasciotomy for consideration of definitive treatment.  We did review the procedure, risk, goals, recovery at length.  Patient has opted to proceed with the surgery.  She states that she is tired of dealing with this issue.  She does understand that she will require nonweightbearing activity for at least 2 weeks after surgery.  Patient would like to tentatively plan for the near future.  I have asked that she decrease her overall activity and I will see her at the time of surgery.    Orders Placed This Encounter   Procedures   • XR Chest 2 View     Standing Status:   Future     Standing Expiration Date:   10/29/2021     Order Specific Question:   Reason for Exam:     Answer:   Preop   • CBC (No Diff)     Standing Status:   Future     Standing Expiration Date:   10/29/2021   • Basic Metabolic Panel     Standing Status:   Future     Standing Expiration Date:   10/29/2021   • Follow Anesthesia Guidelines / Standing Orders     Standing Status:   Future   • Obtain Informed Consent     Standing Status:   Future     Order Specific Question:   Informed Consent Given  For     Answer:   Endoscopic plantar fasciotomy to the right lower extremity   • Provide NPO Instructions to Patient     Standing Status:   Future   • Chlorhexidine Skin Prep     Chlorhexidine Skin Prep and Instructions For All Patients Having A Procedure Requiring an Outward Incision if Not Allergic. If Allergic, Give Antibacterial Skin Wipes and Instructions. Do Not Use For Facial Cases or on Any Mucus Membranes.     Standing Status:   Future   • ECG 12 Lead     Standing Status:   Future     Standing Expiration Date:   10/29/2021     Order Specific Question:   Reason for Exam:     Answer:   Preop          Note is dictated utilizing voice recognition software. Unfortunately this leads to occasional typographical errors. I apologize in advance if the situation occurs. If questions occur please do not hesitate to call our office.

## 2020-11-02 PROBLEM — M72.2 PLANTAR FASCIITIS: Status: ACTIVE | Noted: 2020-11-02

## 2020-11-04 ENCOUNTER — LAB (OUTPATIENT)
Dept: LAB | Facility: HOSPITAL | Age: 70
End: 2020-11-04

## 2020-11-04 ENCOUNTER — HOSPITAL ENCOUNTER (OUTPATIENT)
Dept: GENERAL RADIOLOGY | Facility: HOSPITAL | Age: 70
Discharge: HOME | End: 2020-11-04

## 2020-11-04 ENCOUNTER — HOSPITAL ENCOUNTER (OUTPATIENT)
Dept: CARDIOLOGY | Facility: HOSPITAL | Age: 70
Discharge: HOME | End: 2020-11-04

## 2020-11-04 DIAGNOSIS — M72.2 PLANTAR FASCIITIS: ICD-10-CM

## 2020-11-04 LAB
ANION GAP SERPL CALCULATED.3IONS-SCNC: 10 MMOL/L (ref 5–15)
BUN SERPL-MCNC: 5 MG/DL (ref 8–23)
BUN/CREAT SERPL: 5.3 (ref 7–25)
CALCIUM SPEC-SCNC: 9 MG/DL (ref 8.6–10.5)
CHLORIDE SERPL-SCNC: 103 MMOL/L (ref 98–107)
CO2 SERPL-SCNC: 29 MMOL/L (ref 22–29)
CREAT SERPL-MCNC: 0.95 MG/DL (ref 0.57–1)
DEPRECATED RDW RBC AUTO: 40.7 FL (ref 37–54)
ERYTHROCYTE [DISTWIDTH] IN BLOOD BY AUTOMATED COUNT: 12.6 % (ref 12.3–15.4)
GFR SERPL CREATININE-BSD FRML MDRD: 58 ML/MIN/1.73
GLUCOSE SERPL-MCNC: 73 MG/DL (ref 65–99)
HCT VFR BLD AUTO: 41.5 % (ref 34–46.6)
HGB BLD-MCNC: 14.1 G/DL (ref 12–15.9)
MCH RBC QN AUTO: 30.1 PG (ref 26.6–33)
MCHC RBC AUTO-ENTMCNC: 34 G/DL (ref 31.5–35.7)
MCV RBC AUTO: 88.5 FL (ref 79–97)
PLATELET # BLD AUTO: 313 10*3/MM3 (ref 140–450)
PMV BLD AUTO: 11.1 FL (ref 6–12)
POTASSIUM SERPL-SCNC: 2.9 MMOL/L (ref 3.5–5.2)
RBC # BLD AUTO: 4.69 10*6/MM3 (ref 3.77–5.28)
SODIUM SERPL-SCNC: 142 MMOL/L (ref 136–145)
WBC # BLD AUTO: 7.39 10*3/MM3 (ref 3.4–10.8)

## 2020-11-04 PROCEDURE — 80048 BASIC METABOLIC PNL TOTAL CA: CPT

## 2020-11-04 PROCEDURE — 85027 COMPLETE CBC AUTOMATED: CPT

## 2020-11-04 PROCEDURE — 36415 COLL VENOUS BLD VENIPUNCTURE: CPT

## 2020-11-04 PROCEDURE — 71046 X-RAY EXAM CHEST 2 VIEWS: CPT

## 2020-11-04 PROCEDURE — 93005 ELECTROCARDIOGRAM TRACING: CPT | Performed by: PODIATRIST

## 2020-11-04 PROCEDURE — 93010 ELECTROCARDIOGRAM REPORT: CPT | Performed by: INTERNAL MEDICINE

## 2020-11-05 NOTE — PAT
Pt notified of low K+, she said she has a rx to take when its low.  Advised her to take it and increase K+ in her diet, will re check DOS.

## 2020-11-06 ENCOUNTER — ANESTHESIA EVENT (OUTPATIENT)
Dept: PERIOP | Facility: HOSPITAL | Age: 70
End: 2020-11-06

## 2020-11-06 ENCOUNTER — LAB (OUTPATIENT)
Dept: LAB | Facility: HOSPITAL | Age: 70
End: 2020-11-06

## 2020-11-06 LAB — QT INTERVAL: 397 MS

## 2020-11-06 PROCEDURE — U0004 COV-19 TEST NON-CDC HGH THRU: HCPCS

## 2020-11-06 PROCEDURE — C9803 HOPD COVID-19 SPEC COLLECT: HCPCS

## 2020-11-07 LAB — SARS-COV-2 RNA RESP QL NAA+PROBE: NOT DETECTED

## 2020-11-09 ENCOUNTER — HOSPITAL ENCOUNTER (OUTPATIENT)
Facility: HOSPITAL | Age: 70
Setting detail: HOSPITAL OUTPATIENT SURGERY
Discharge: HOME | End: 2020-11-09
Attending: PODIATRIST | Admitting: PODIATRIST

## 2020-11-09 ENCOUNTER — ANESTHESIA (OUTPATIENT)
Dept: PERIOP | Facility: HOSPITAL | Age: 70
End: 2020-11-09

## 2020-11-09 VITALS
SYSTOLIC BLOOD PRESSURE: 111 MMHG | OXYGEN SATURATION: 100 % | DIASTOLIC BLOOD PRESSURE: 69 MMHG | TEMPERATURE: 96.7 F | HEART RATE: 73 BPM | BODY MASS INDEX: 25.2 KG/M2 | RESPIRATION RATE: 12 BRPM | WEIGHT: 142.2 LBS | HEIGHT: 63 IN

## 2020-11-09 DIAGNOSIS — M72.2 PLANTAR FASCIITIS: ICD-10-CM

## 2020-11-09 LAB — POTASSIUM SERPL-SCNC: 3.7 MMOL/L (ref 3.5–5.2)

## 2020-11-09 PROCEDURE — 84132 ASSAY OF SERUM POTASSIUM: CPT | Performed by: PODIATRIST

## 2020-11-09 PROCEDURE — 25010000002 FENTANYL CITRATE (PF) 100 MCG/2ML SOLUTION: Performed by: ANESTHESIOLOGY

## 2020-11-09 PROCEDURE — 25010000002 KETOROLAC TROMETHAMINE PER 15 MG: Performed by: NURSE ANESTHETIST, CERTIFIED REGISTERED

## 2020-11-09 PROCEDURE — 25010000002 PROPOFOL 10 MG/ML EMULSION: Performed by: ANESTHESIOLOGY

## 2020-11-09 PROCEDURE — 25010000002 ONDANSETRON PER 1 MG: Performed by: ANESTHESIOLOGY

## 2020-11-09 PROCEDURE — 25010000002 MIDAZOLAM PER 1 MG: Performed by: ANESTHESIOLOGY

## 2020-11-09 PROCEDURE — 29893 ENDOSCOPIC PLANTR FASCIOTOMY: CPT | Performed by: PODIATRIST

## 2020-11-09 PROCEDURE — 25010000002 DEXAMETHASONE PER 1 MG: Performed by: ANESTHESIOLOGY

## 2020-11-09 RX ORDER — HYDROCODONE BITARTRATE AND ACETAMINOPHEN 7.5; 325 MG/1; MG/1
1 TABLET ORAL EVERY 6 HOURS PRN
Qty: 28 TABLET | Refills: 0 | Status: SHIPPED | OUTPATIENT
Start: 2020-11-09 | End: 2020-12-28

## 2020-11-09 RX ORDER — PHENYLEPHRINE HCL IN 0.9% NACL 0.5 MG/5ML
SYRINGE (ML) INTRAVENOUS AS NEEDED
Status: DISCONTINUED | OUTPATIENT
Start: 2020-11-09 | End: 2020-11-09 | Stop reason: SURG

## 2020-11-09 RX ORDER — ONDANSETRON 2 MG/ML
INJECTION INTRAMUSCULAR; INTRAVENOUS AS NEEDED
Status: DISCONTINUED | OUTPATIENT
Start: 2020-11-09 | End: 2020-11-09 | Stop reason: SURG

## 2020-11-09 RX ORDER — DEXAMETHASONE SODIUM PHOSPHATE 4 MG/ML
INJECTION, SOLUTION INTRA-ARTICULAR; INTRALESIONAL; INTRAMUSCULAR; INTRAVENOUS; SOFT TISSUE AS NEEDED
Status: DISCONTINUED | OUTPATIENT
Start: 2020-11-09 | End: 2020-11-09 | Stop reason: SURG

## 2020-11-09 RX ORDER — KETOROLAC TROMETHAMINE 30 MG/ML
INJECTION, SOLUTION INTRAMUSCULAR; INTRAVENOUS AS NEEDED
Status: DISCONTINUED | OUTPATIENT
Start: 2020-11-09 | End: 2020-11-09 | Stop reason: SURG

## 2020-11-09 RX ORDER — ONDANSETRON 2 MG/ML
4 INJECTION INTRAMUSCULAR; INTRAVENOUS ONCE AS NEEDED
Status: DISCONTINUED | OUTPATIENT
Start: 2020-11-09 | End: 2020-11-09 | Stop reason: HOSPADM

## 2020-11-09 RX ORDER — HYDROCODONE BITARTRATE AND ACETAMINOPHEN 7.5; 325 MG/1; MG/1
1 TABLET ORAL ONCE
Status: COMPLETED | OUTPATIENT
Start: 2020-11-09 | End: 2020-11-09

## 2020-11-09 RX ORDER — MIDAZOLAM HYDROCHLORIDE 1 MG/ML
INJECTION INTRAMUSCULAR; INTRAVENOUS AS NEEDED
Status: DISCONTINUED | OUTPATIENT
Start: 2020-11-09 | End: 2020-11-09 | Stop reason: SURG

## 2020-11-09 RX ORDER — FENTANYL CITRATE 50 UG/ML
INJECTION, SOLUTION INTRAMUSCULAR; INTRAVENOUS AS NEEDED
Status: DISCONTINUED | OUTPATIENT
Start: 2020-11-09 | End: 2020-11-09 | Stop reason: SURG

## 2020-11-09 RX ORDER — SODIUM CHLORIDE 0.9 % (FLUSH) 0.9 %
10 SYRINGE (ML) INJECTION AS NEEDED
Status: DISCONTINUED | OUTPATIENT
Start: 2020-11-09 | End: 2020-11-09 | Stop reason: HOSPADM

## 2020-11-09 RX ORDER — FENTANYL CITRATE 50 UG/ML
50 INJECTION, SOLUTION INTRAMUSCULAR; INTRAVENOUS
Status: DISCONTINUED | OUTPATIENT
Start: 2020-11-09 | End: 2020-11-09 | Stop reason: HOSPADM

## 2020-11-09 RX ORDER — BUPIVACAINE HYDROCHLORIDE 5 MG/ML
INJECTION, SOLUTION PERINEURAL AS NEEDED
Status: DISCONTINUED | OUTPATIENT
Start: 2020-11-09 | End: 2020-11-09 | Stop reason: HOSPADM

## 2020-11-09 RX ORDER — PROPOFOL 10 MG/ML
VIAL (ML) INTRAVENOUS AS NEEDED
Status: DISCONTINUED | OUTPATIENT
Start: 2020-11-09 | End: 2020-11-09 | Stop reason: SURG

## 2020-11-09 RX ORDER — SODIUM CHLORIDE, SODIUM LACTATE, POTASSIUM CHLORIDE, CALCIUM CHLORIDE 600; 310; 30; 20 MG/100ML; MG/100ML; MG/100ML; MG/100ML
9 INJECTION, SOLUTION INTRAVENOUS CONTINUOUS PRN
Status: DISCONTINUED | OUTPATIENT
Start: 2020-11-09 | End: 2020-11-09 | Stop reason: HOSPADM

## 2020-11-09 RX ADMIN — FENTANYL CITRATE 50 MCG: 50 INJECTION, SOLUTION INTRAMUSCULAR; INTRAVENOUS at 12:20

## 2020-11-09 RX ADMIN — MIDAZOLAM 2 MG: 1 INJECTION INTRAMUSCULAR; INTRAVENOUS at 12:14

## 2020-11-09 RX ADMIN — PHENYLEPHRINE HYDROCHLORIDE 100 MCG: 10 INJECTION INTRAVENOUS at 12:48

## 2020-11-09 RX ADMIN — KETOROLAC TROMETHAMINE 30 MG: 30 INJECTION, SOLUTION INTRAMUSCULAR at 12:52

## 2020-11-09 RX ADMIN — CEFAZOLIN SODIUM 2 G: 1 INJECTION, POWDER, FOR SOLUTION INTRAMUSCULAR; INTRAVENOUS at 12:25

## 2020-11-09 RX ADMIN — FENTANYL CITRATE 50 MCG: 50 INJECTION, SOLUTION INTRAMUSCULAR; INTRAVENOUS at 13:35

## 2020-11-09 RX ADMIN — PROPOFOL 150 MG: 10 INJECTION, EMULSION INTRAVENOUS at 12:16

## 2020-11-09 RX ADMIN — DEXAMETHASONE SODIUM PHOSPHATE 4 MG: 4 INJECTION, SOLUTION INTRAMUSCULAR; INTRAVENOUS at 12:38

## 2020-11-09 RX ADMIN — ONDANSETRON 4 MG: 2 INJECTION INTRAMUSCULAR; INTRAVENOUS at 12:38

## 2020-11-09 RX ADMIN — PROPOFOL 50 MG: 10 INJECTION, EMULSION INTRAVENOUS at 12:17

## 2020-11-09 RX ADMIN — SODIUM CHLORIDE, POTASSIUM CHLORIDE, SODIUM LACTATE AND CALCIUM CHLORIDE 9 ML/HR: 600; 310; 30; 20 INJECTION, SOLUTION INTRAVENOUS at 10:24

## 2020-11-09 RX ADMIN — FENTANYL CITRATE 50 MCG: 50 INJECTION, SOLUTION INTRAMUSCULAR; INTRAVENOUS at 12:38

## 2020-11-09 RX ADMIN — HYDROCODONE BITARTRATE AND ACETAMINOPHEN 1 TABLET: 7.5; 325 TABLET ORAL at 13:48

## 2020-11-09 NOTE — OP NOTE
Operative Note   Foot and Ankle Surgery   Provider: Dr. Reginald Ro   Location: [facility]      Procedure:  1.  Endoscopic plantar fasciotomy, right    Pre-operative Diagnosis:   1.  Plantar fasciitis, right    Post-operative Diagnosis: Same    Surgeon: Reginald Ro    Assistant: Olayinka Case, PGY 1    Anesthesia: General    Implants: None    Findings: No unexpected findings    Specimen: None    Blood Loss: Less than 5cc    Complications: None    Post Op Plan: Discharge home.  Strict nonweightbearing activity.  Follow-up with me in 2 weeks    Summary:    Patient is a pleasant 70-year-old female that was seen in office for longstanding pain involving the plantar aspect of the right heel.  Her symptoms are consistent with plantar fasciitis.  She has failed multiple conservative treatments and is tired of dealing with this pain.  We did discuss endoscopic plantar fasciotomy for definitive management.  She understands that she will require offloading and off weightbearing after surgery.    Procedure, risks, complications, and goals were discussed with the patient at bedside.  Risks include but are not limited to infection, complications from anesthesia (including death), chronic pain or numbness, hematoma/seroma, deep vein thrombosis, wound complications, and potential for additional surgical procedures.  Patient understands and elects to proceed with surgery at this time. Informed consent was obtained before proceeding to the operating suite.  All questions were answered to the patient's satisfaction. No guarantees or assurances were given or implied.    Procedure:    Patient was brought to the operating room and placed on the operative table in a supine position.  Once adequate general anesthesia was administered, a pneumatic tourniquet was placed about the patient's operative calf.  The foot was scrubbed prepped and draped in the usual sterile fashion.  The limb was elevated and exsanguinated and the  pneumatic tourniquet was inflated to 275 mmHg.  A formal timeout was conducted prior skin incision.    Attention was then directed to the heel.  A small stab incision was performed to the medial plantar aspect of the heel close to the level of the plantar fascia origin.  Blunt dissection was performed to the level of the ligament.  The plantar fascial ligament was identified and undermined with a [place].  The trocar and cannula were then introduced into the medial portal and a lateral exit portal was obtained.  All stab incision was performed allowing for exit of the trocar.  The trocar was removed and the 2.7 mm arthroscope was introduced into the lateral portal.  The plantar fascial ligament was directly visualized and the foot with the hallux was placed into dorsiflexion allowing for maximal tension on the plantar fascial ligament.  Approximately 50% of the ligament was transected utilizing a triangle blade.  The resection was checked multiple times with a blunt probe to ensure adequate release.  The muscle belly of the flexor digitorum longus was identified.  The wound was irrigated with copious amounts of normal saline.  All instrumentation was removed.  A postoperative block was performed utilizing 20 cc of half percent Marcaine plain.  The incisions were closed with a 3-0 nylon.  The wounds were dressed with Xeroform and sterile compressive dressings.  The tourniquet was released and prompt hyperemic response was noted to all digits of the operative foot.  The limb was placed into a well-padded posterior splint.  Patient tolerated the procedure and anesthesia well. She was transferred from the operating room to the recovery room with vital signs stable and neurovascular status unchanged to the operative extremity.      Dr. Reginald Ro, APRILM  [facility]  [phone]    Note is dictated utilizing voice recognition software. Unfortunately this leads to occasional typographical errors. I apologize  in advance if the situation occurs. If questions occur please do not hesitate to call our office.

## 2020-11-09 NOTE — ANESTHESIA POSTPROCEDURE EVALUATION
Patient: Krystyna Fabian    Procedure Summary     Date: 11/09/20 Room / Location: [facility] OR 11 / [facility] MAIN OR    Anesthesia Start: 1212 Anesthesia Stop: 1306    Procedure: ENDOSCOPIC PLANTAR FASCIOTOMY (Right Foot) Diagnosis:       Plantar fasciitis      (Plantar fasciitis [M72.2])    Surgeon: GERRI Ro DPM Provider: Mejia Maldonado MD    Anesthesia Type: general ASA Status: 3          Anesthesia Type: general    Vitals  Vitals Value Taken Time   BP 99/59 11/09/20 1354   Temp 97 °F (36.1 °C) 11/09/20 1353   Pulse 74 11/09/20 1355   Resp 19 11/09/20 1353   SpO2 97 % 11/09/20 1355   Vitals shown include unvalidated device data.        Post Anesthesia Care and Evaluation    Patient location during evaluation: PACU  Patient participation: complete - patient participated  Level of consciousness: awake  Pain scale: See nurse's notes for pain score.  Pain management: adequate  Airway patency: patent  Anesthetic complications: No anesthetic complications  PONV Status: none  Cardiovascular status: acceptable  Respiratory status: acceptable  Hydration status: acceptable    Comments: Patient seen and examined postoperatively; vital signs stable; SpO2 greater than or equal to 90%; cardiopulmonary status stable; nausea/vomiting adequately controlled; pain adequately controlled; no apparent anesthesia complications; patient discharged from anesthesia care when discharge criteria were met

## 2020-11-09 NOTE — ANESTHESIA PREPROCEDURE EVALUATION
Anesthesia Evaluation     Patient summary reviewed and Nursing notes reviewed   no history of anesthetic complications:  NPO Solid Status: > 8 hours  NPO Liquid Status: > 8 hours           Airway   Dental      Pulmonary    Cardiovascular     ECG reviewed        Neuro/Psych  (+) headaches, numbness, psychiatric history Anxiety and Depression,     GI/Hepatic/Renal/Endo    (+)  GERD,      Musculoskeletal     (+) chronic pain, myalgias,   Abdominal    Substance History      OB/GYN          Other   arthritis, autoimmune disease ,      ROS/Med Hx Other: Plantar fasciitis, hypokalemia, hip bursitis, sciatica, IBS, cystitis, DDD, low vit B12, paresthesia, colitis, edema, leg cramps, insomnia, edema, fibromyalgia, allergies, pulmonary hypertension, migraines, laryngopharyngeal reflux, bronchitis, sinusitis    PSH  CHOLECYSTECTOMY THIGH LIFT  TUBAL ABDOMINAL LIGATION COLONOSCOPY  EPIDURAL LUMBAR SPINE SURGERY  LUMBAR DISC SURGERY ABDOMINOPLASTY  LUMBAR SPINE SURGERY                   Anesthesia Plan    ASA 3     general   (Patient identified; pre-operative vital signs, all relevant labs/studies, complete medical/surgical/anesthetic history, full medication list, full allergy list, and NPO status obtained/reviewed; physical assessment performed; anesthetic options, side effects, potential complications, risks, and benefits discussed; questions answered; written anesthesia consent obtained; patient cleared for procedure; anesthesia machine and equipment checked and functioning)  intravenous induction     Anesthetic plan, all risks, benefits, and alternatives have been provided, discussed and informed consent has been obtained with: patient.    Plan discussed with CRNA and CAA.

## 2020-11-09 NOTE — ANESTHESIA PROCEDURE NOTES
Airway  Urgency: elective    Date/Time: 11/9/2020 12:18 PM  Airway not difficult    General Information and Staff    Patient location during procedure: OR    Indications and Patient Condition  Indications for airway management: airway protection    Preoxygenated: yes  Mask difficulty assessment: 1 - vent by mask    Final Airway Details  Final airway type: supraglottic airway      Successful airway: LMA  Size 4    Number of attempts at approach: 1  Assessment: lips, teeth, and gum same as pre-op and atraumatic intubation

## 2020-11-23 ENCOUNTER — OFFICE VISIT (OUTPATIENT)
Dept: PODIATRY | Facility: CLINIC | Age: 70
End: 2020-11-23

## 2020-11-23 VITALS
HEIGHT: 63 IN | SYSTOLIC BLOOD PRESSURE: 96 MMHG | BODY MASS INDEX: 25.16 KG/M2 | WEIGHT: 142 LBS | HEART RATE: 80 BPM | DIASTOLIC BLOOD PRESSURE: 63 MMHG

## 2020-11-23 DIAGNOSIS — M72.2 PLANTAR FASCIITIS: Primary | ICD-10-CM

## 2020-11-23 PROCEDURE — 97760 ORTHOTIC MGMT&TRAING 1ST ENC: CPT | Performed by: PODIATRIST

## 2020-11-23 PROCEDURE — 99024 POSTOP FOLLOW-UP VISIT: CPT | Performed by: PODIATRIST

## 2020-11-23 NOTE — PROGRESS NOTES
11/23/2020  Foot and Ankle Surgery - Established Patient/Follow-up  Provider: Dr. Reginald Ro, DAJUAN  Location: [facility]    Subjective:  Krystyna Fabian is a 70 y.o. female.     Chief Complaint   Patient presents with   • Right Foot - Follow-up, Post-op       HPI: Patient returns approximately 2 weeks after EPF.  She states that she is doing quite well without any significant pain.  She has remained off weightbearing as instructed.    Allergies   Allergen Reactions   • Butorphanol Itching     stadol   • Erythromycin Other (See Comments)     CAUSES HYPERTENSION         Current Outpatient Medications on File Prior to Visit   Medication Sig Dispense Refill   • buPROPion XL (WELLBUTRIN XL) 300 MG 24 hr tablet Take 1 tablet by mouth Daily. (Patient taking differently: Take 300 mg by mouth Daily. Take preop) 90 tablet 1   • Cyanocobalamin (B-12 COMPLIANCE INJECTION) 1000 MCG/ML kit Inject 1,000 mcg under the skin into the appropriate area as directed Every 30 (Thirty) Days.     • docusate sodium 100 MG capsule Take 100 mg by mouth.     • furosemide (LASIX) 40 MG tablet Take 1 tablet by mouth Daily As Needed (edema). (Patient taking differently: Take 40 mg by mouth Daily As Needed (edema). hasnt needed x 30 days   Do not take dos) 30 tablet 1   • HYDROcodone-acetaminophen (NORCO) 7.5-325 MG per tablet Take 1 tablet by mouth Every 6 (Six) Hours As Needed for Moderate Pain  (Pain). 28 tablet 0   • omeprazole (PrilOSEC) 40 MG capsule Take 1 capsule by mouth 2 (Two) Times a Day. (Patient taking differently: Take 40 mg by mouth 2 (Two) Times a Day. Take preop) 180 capsule 1   • potassium chloride (KLOR-CON) 10 MEQ CR tablet 2 po q d prn when take lasix (Patient taking differently: Take 20 mEq by mouth As Needed. 2 po q d prn when take lasix   Ok to take dos) 60 tablet 1   • QUEtiapine (SEROquel) 50 MG tablet TAKE 1 TABLET AT BEDTIME (Patient taking differently: Take 50 mg by mouth Every Night.) 90 tablet 1  "  • tiZANidine (ZANAFLEX) 4 MG tablet Take 2 tablets by mouth Every Night. 180 tablet 0   • VITAMIN D, CHOLECALCIFEROL, PO Take 1 tablet by mouth Daily.     • zolpidem (AMBIEN) 5 MG tablet TAKE ONE TABLET BY MOUTH EVERY NIGHT AT BEDTIME AS NEEDED FOR SLEEP (Patient taking differently: Take 5 mg by mouth At Night As Needed.) 30 tablet 2     Current Facility-Administered Medications on File Prior to Visit   Medication Dose Route Frequency Provider Last Rate Last Admin   • triamcinolone acetonide (KENALOG-40) injection 80 mg  80 mg Intramuscular Once Sadiq Parker MD           Objective   BP 96/63   Pulse 80   Ht 160 cm (63\")   Wt 64.4 kg (142 lb)   BMI 25.15 kg/m²     Podiatry Exam       General Appearance:  well nourished; well hydrated; no acute distress  Mental Status Exam        Judgement and Insight:  Intact       Orientation:  Oriented to time, place, and person  Cardiovascular (Right)       Dorsalis Pedis Pulse (Rt):  2/4       Posterior Tibialis Pulse (Rt):  2/4       Capillary Filling Time (Rt):  1-3 Seconds       Edema (Rt):  No Edema  Dermatological Exam       Temperature:  warm to warm       Skin Elasticity:  normal skin elasticity  Incision site is dry and stable with intact sutures.  No evidence of dehiscence or infection  Neurological Exam (Right)       Paresthesia (Rt):  negative       Achilles DTRs (Rt):  symmetric       Tinel over Tarsal Tunnel (Rt):  negative     MusculoSkeletal Exam (Right)       Gait and Stance (Rt):   mildly antalgic       ROM (Rt):   no pain or limitation with range of motion of the first metatarsophalangeal joint.  Other joints are unremarkable       Muscle Strength (Rt):  symmetrical 5/5; mild guarding       Subluxed Digits (Rt):  no subluxation or laxity of joints       Dislocated Joints (Rt):  no dislocation of joints       Inflammed Joints (Rt):  no inflammation of joints       Hammertoe Deformity (Rt):  none       Claw Toe Deformity (Rt):  none       Medial " Turbicle Calc (Rt): Mild discomfort       Gastroc soleus equinus (Rt):  Inability to dorsiflex past neutral position both straight legged and bent knee       Post tibial tendon (Rt):  no soreness noted       Peroneal Tendon (Rt):  no soreness noted       Capsulitis of the MPJs (Rt):  no soreness noted    Assessment/Plan   Diagnoses and all orders for this visit:    1. Plantar fasciitis (Primary)      Patient is doing quite well at this time.  Sutures were removed today.  Patient has been transition to a cam boot.  Greater than 15 minutes was spent reviewing the proper use and effects of the boot.  I would like her to start weightbearing activity as tolerated.  She is to remain in the boot for at least 2 weeks and then gradually transition to a regular shoe as tolerated.  I would like her to perform stretching exercises and manual therapy routinely.  She is to avoid high-impact and excessive activity.  I will see her in 4 weeks for reevaluation.    No orders of the defined types were placed in this encounter.         Note is dictated utilizing voice recognition software. Unfortunately this leads to occasional typographical errors. I apologize in advance if the situation occurs. If questions occur please do not hesitate to call our office.

## 2020-12-28 ENCOUNTER — OFFICE VISIT (OUTPATIENT)
Dept: PODIATRY | Facility: CLINIC | Age: 70
End: 2020-12-28

## 2020-12-28 VITALS
SYSTOLIC BLOOD PRESSURE: 111 MMHG | DIASTOLIC BLOOD PRESSURE: 72 MMHG | WEIGHT: 142 LBS | HEIGHT: 63 IN | BODY MASS INDEX: 25.16 KG/M2 | HEART RATE: 83 BPM

## 2020-12-28 DIAGNOSIS — M72.2 PLANTAR FASCIITIS: Primary | ICD-10-CM

## 2020-12-28 PROCEDURE — 99024 POSTOP FOLLOW-UP VISIT: CPT | Performed by: PODIATRIST

## 2020-12-28 NOTE — PROGRESS NOTES
12/28/2020  Foot and Ankle Surgery - Established Patient/Follow-up  Provider: Dr. Reginald Ro, DAJUAN  Location: [facility]    Subjective:  Krystyna Fabian is a 70 y.o. female.     Chief Complaint   Patient presents with   • Right Foot - Follow-up, Post-op       HPI: Patient returns 6 weeks after surgery and states that she feels 90% better on her right foot.  She has no significant pain or limitation has been wearing regular shoes.  She denies any other issues at this time.    Allergies   Allergen Reactions   • Butorphanol Itching     stadol   • Erythromycin Other (See Comments)     CAUSES HYPERTENSION         Current Outpatient Medications on File Prior to Visit   Medication Sig Dispense Refill   • buPROPion XL (WELLBUTRIN XL) 300 MG 24 hr tablet Take 1 tablet by mouth Daily. (Patient taking differently: Take 300 mg by mouth Daily. Take preop) 90 tablet 1   • Cyanocobalamin (B-12 COMPLIANCE INJECTION) 1000 MCG/ML kit Inject 1,000 mcg under the skin into the appropriate area as directed Every 30 (Thirty) Days.     • docusate sodium 100 MG capsule Take 100 mg by mouth.     • furosemide (LASIX) 40 MG tablet Take 1 tablet by mouth Daily As Needed (edema). (Patient taking differently: Take 40 mg by mouth Daily As Needed (edema). hasnt needed x 30 days   Do not take dos) 30 tablet 1   • omeprazole (PrilOSEC) 40 MG capsule Take 1 capsule by mouth 2 (Two) Times a Day. (Patient taking differently: Take 40 mg by mouth 2 (Two) Times a Day. Take preop) 180 capsule 1   • potassium chloride (KLOR-CON) 10 MEQ CR tablet 2 po q d prn when take lasix (Patient taking differently: Take 20 mEq by mouth As Needed. 2 po q d prn when take lasix   Ok to take dos) 60 tablet 1   • QUEtiapine (SEROquel) 50 MG tablet TAKE 1 TABLET AT BEDTIME (Patient taking differently: Take 50 mg by mouth Every Night.) 90 tablet 1   • tiZANidine (ZANAFLEX) 4 MG tablet Take 2 tablets by mouth Every Night. 180 tablet 0   • VITAMIN D,  "CHOLECALCIFEROL, PO Take 1 tablet by mouth Daily.     • zolpidem (AMBIEN) 5 MG tablet TAKE ONE TABLET BY MOUTH EVERY NIGHT AT BEDTIME AS NEEDED FOR SLEEP (Patient taking differently: Take 5 mg by mouth At Night As Needed.) 30 tablet 2   • [DISCONTINUED] HYDROcodone-acetaminophen (NORCO) 7.5-325 MG per tablet Take 1 tablet by mouth Every 6 (Six) Hours As Needed for Moderate Pain  (Pain). 28 tablet 0     Current Facility-Administered Medications on File Prior to Visit   Medication Dose Route Frequency Provider Last Rate Last Admin   • triamcinolone acetonide (KENALOG-40) injection 80 mg  80 mg Intramuscular Once Sadiq Parker MD           Objective   /72   Pulse 83   Ht 160 cm (63\")   Wt 64.4 kg (142 lb)   BMI 25.15 kg/m²     Podiatry Exam       General Appearance:  well nourished; well hydrated; no acute distress  Mental Status Exam        Judgement and Insight:  Intact       Orientation:  Oriented to time, place, and person  Cardiovascular (Right)       Dorsalis Pedis Pulse (Rt):  2/4       Posterior Tibialis Pulse (Rt):  2/4       Capillary Filling Time (Rt):  1-3 Seconds       Edema (Rt):  No Edema  Dermatological Exam       Temperature:  warm to warm       Skin Elasticity:  normal skin elasticity  Incision site is well-healed.  Neurological Exam (Right)       Paresthesia (Rt):  negative       Achilles DTRs (Rt):  symmetric       Tinel over Tarsal Tunnel (Rt):  negative     MusculoSkeletal Exam (Right)       Gait and Stance (Rt):   mildly antalgic       ROM (Rt):   no pain or limitation with range of motion of the first metatarsophalangeal joint.  Other joints are unremarkable       Muscle Strength (Rt):  symmetrical 5/5; mild guarding       Subluxed Digits (Rt):  no subluxation or laxity of joints       Dislocated Joints (Rt):  no dislocation of joints       Inflammed Joints (Rt):  no inflammation of joints       Hammertoe Deformity (Rt):  none       Claw Toe Deformity (Rt):  none       Medial " Turbicle Calc (Rt): Mild discomfort       Gastroc soleus equinus (Rt):  Inability to dorsiflex past neutral position both straight legged and bent knee       Post tibial tendon (Rt):  no soreness noted       Peroneal Tendon (Rt):  no soreness noted       Capsulitis of the MPJs (Rt):  no soreness noted    Assessment/Plan   Diagnoses and all orders for this visit:    1. Plantar fasciitis (Primary)      Patient is significantly better.  She has no pain or limitation to the plantar aspect of the right heel today.  She has been ambulating in regular shoes.  I have asked that she gradually resume baseline activity.  She is to monitor and call with any issues or concerns.  We did discuss the continued importance of stretching and manual therapy exercises.    No orders of the defined types were placed in this encounter.         Note is dictated utilizing voice recognition software. Unfortunately this leads to occasional typographical errors. I apologize in advance if the situation occurs. If questions occur please do not hesitate to call our office.

## 2021-01-04 DIAGNOSIS — F51.01 PRIMARY INSOMNIA: ICD-10-CM

## 2021-01-04 RX ORDER — ZOLPIDEM TARTRATE 5 MG/1
TABLET ORAL
Qty: 30 TABLET | Refills: 5 | Status: SHIPPED | OUTPATIENT
Start: 2021-01-04 | End: 2021-06-29 | Stop reason: SDUPTHER

## 2021-03-12 NOTE — TELEPHONE ENCOUNTER
Caller: Krystyna Fabian    Relationship: Self    Best call back number: [phone]    Medication needed:   Requested Prescriptions     Pending Prescriptions Disp Refills   • buPROPion XL (WELLBUTRIN XL) 300 MG 24 hr tablet 90 tablet 1     Sig: Take 1 tablet by mouth Daily.       When do you need the refill by: ASAP     What details did the patient provide when requesting the medication: PT HAS 1 PILL LEFT     Does the patient have less than a 3 day supply:  [x] Yes  [] No    What is the patient's preferred pharmacy: [facility] [address] [place] [address] [address] AT [facility] - [phone]  - [phone] FX

## 2021-03-15 RX ORDER — BUPROPION HYDROCHLORIDE 300 MG/1
300 TABLET ORAL DAILY
Qty: 90 TABLET | Refills: 1 | Status: SHIPPED | OUTPATIENT
Start: 2021-03-15 | End: 2021-04-13

## 2021-04-13 ENCOUNTER — OFFICE VISIT (OUTPATIENT)
Dept: FAMILY MEDICINE CLINIC | Facility: CLINIC | Age: 71
End: 2021-04-13

## 2021-04-13 VITALS
TEMPERATURE: 98.2 F | HEIGHT: 63 IN | WEIGHT: 151 LBS | OXYGEN SATURATION: 98 % | SYSTOLIC BLOOD PRESSURE: 114 MMHG | HEART RATE: 73 BPM | DIASTOLIC BLOOD PRESSURE: 75 MMHG | BODY MASS INDEX: 26.75 KG/M2

## 2021-04-13 DIAGNOSIS — F32.5 MAJOR DEPRESSIVE DISORDER IN FULL REMISSION, UNSPECIFIED WHETHER RECURRENT (HCC): ICD-10-CM

## 2021-04-13 DIAGNOSIS — F51.01 PRIMARY INSOMNIA: ICD-10-CM

## 2021-04-13 DIAGNOSIS — K21.9 GASTROESOPHAGEAL REFLUX DISEASE WITHOUT ESOPHAGITIS: ICD-10-CM

## 2021-04-13 DIAGNOSIS — R35.0 FREQUENCY OF URINATION: ICD-10-CM

## 2021-04-13 DIAGNOSIS — E55.9 VITAMIN D DEFICIENCY: ICD-10-CM

## 2021-04-13 DIAGNOSIS — R60.9 EDEMA, UNSPECIFIED TYPE: Primary | ICD-10-CM

## 2021-04-13 DIAGNOSIS — Z00.00 PREVENTATIVE HEALTH CARE: ICD-10-CM

## 2021-04-13 PROCEDURE — 87086 URINE CULTURE/COLONY COUNT: CPT | Performed by: NURSE PRACTITIONER

## 2021-04-13 PROCEDURE — 80307 DRUG TEST PRSMV CHEM ANLYZR: CPT | Performed by: NURSE PRACTITIONER

## 2021-04-13 PROCEDURE — 84443 ASSAY THYROID STIM HORMONE: CPT | Performed by: NURSE PRACTITIONER

## 2021-04-13 PROCEDURE — 81002 URINALYSIS NONAUTO W/O SCOPE: CPT | Performed by: NURSE PRACTITIONER

## 2021-04-13 PROCEDURE — 36415 COLL VENOUS BLD VENIPUNCTURE: CPT | Performed by: NURSE PRACTITIONER

## 2021-04-13 PROCEDURE — 87181 SC STD AGAR DILUTION PER AGT: CPT | Performed by: NURSE PRACTITIONER

## 2021-04-13 PROCEDURE — 82306 VITAMIN D 25 HYDROXY: CPT | Performed by: NURSE PRACTITIONER

## 2021-04-13 PROCEDURE — 99214 OFFICE O/P EST MOD 30 MIN: CPT | Performed by: NURSE PRACTITIONER

## 2021-04-13 PROCEDURE — 80061 LIPID PANEL: CPT | Performed by: NURSE PRACTITIONER

## 2021-04-13 PROCEDURE — 80053 COMPREHEN METABOLIC PANEL: CPT | Performed by: NURSE PRACTITIONER

## 2021-04-13 PROCEDURE — 87186 SC STD MICRODIL/AGAR DIL: CPT | Performed by: NURSE PRACTITIONER

## 2021-04-13 PROCEDURE — 86803 HEPATITIS C AB TEST: CPT | Performed by: NURSE PRACTITIONER

## 2021-04-13 PROCEDURE — 87077 CULTURE AEROBIC IDENTIFY: CPT | Performed by: NURSE PRACTITIONER

## 2021-04-13 PROCEDURE — 85025 COMPLETE CBC W/AUTO DIFF WBC: CPT | Performed by: NURSE PRACTITIONER

## 2021-04-13 RX ORDER — NITROFURANTOIN 25; 75 MG/1; MG/1
100 CAPSULE ORAL 2 TIMES DAILY
Qty: 14 CAPSULE | Refills: 0 | Status: SHIPPED | OUTPATIENT
Start: 2021-04-13 | End: 2021-05-27

## 2021-04-13 RX ORDER — FUROSEMIDE 40 MG/1
40 TABLET ORAL DAILY PRN
Qty: 30 TABLET | Refills: 1 | Status: SHIPPED | OUTPATIENT
Start: 2021-04-13 | End: 2021-06-09

## 2021-04-13 RX ORDER — BUPROPION HYDROCHLORIDE 150 MG/1
150 TABLET ORAL DAILY
Qty: 20 TABLET | Refills: 0 | Status: SHIPPED | OUTPATIENT
Start: 2021-04-13 | End: 2021-05-27

## 2021-04-13 NOTE — ASSESSMENT & PLAN NOTE
Patient reports Wellbutrin started nearly 20 years ago during her divorce.  Patient does not feel like she needs medication any longer and wants to discuss weaning off of medication.    1.  Take Wellbutrin 100 mg daily x10 days then every other day x10 days then stop  2.  Call with any signs or symptoms of depression or anxiety

## 2021-04-13 NOTE — ASSESSMENT & PLAN NOTE
Patient has not had any Lasix in a few days.  Advised that she take Lasix today due to extremity swelling.  Continue Lasix as needed swelling

## 2021-04-13 NOTE — PROGRESS NOTES
"Chief Complaint  Follow-up (follow up for medication refills used to see Dr. Parker) and Urinary Tract Infection (pain with urination )    Subjective          Krystyna Fabian presents to [facility]     Urinary Tract Infection   This is a new problem. The quality of the pain is described as burning. There has been no fever. Associated symptoms include frequency. Pertinent negatives include no hematuria, nausea or urgency. She has tried nothing for the symptoms. Her past medical history is significant for recurrent UTIs.   Depression  Visit Type: follow-up  Patient presents with the following symptoms: insomnia.  Patient is not experiencing: depressed mood, fatigue, nausea and thoughts of death.  Frequency of symptoms: rarely   Severity: mild   Sleep quality: fair  Nighttime awakenings: one to two  Compliance with medications:  %        Insomnia  This is a chronic problem. Pertinent negatives include no coughing, fatigue, nausea, numbness or weakness. Treatments tried: Trazodone and Seroquel. The treatment provided significant relief.   Heartburn  She complains of heartburn. She reports no coughing or no nausea. This is a chronic problem. The problem occurs occasionally. The heartburn is of mild intensity. The symptoms are aggravated by certain foods. Pertinent negatives include no fatigue. She has tried a PPI for the symptoms.   Leg Swelling  This is a chronic problem. The problem occurs intermittently. Pertinent negatives include no coughing, fatigue, nausea, numbness or weakness. Treatments tried: Lasix PRN. The treatment provided significant relief.       Objective   Vital Signs:   /75 (BP Location: Left arm, Patient Position: Sitting, Cuff Size: Adult)   Pulse 73   Temp 98.2 °F (36.8 °C) (Skin)   Ht 160 cm (63\")   Wt 68.5 kg (151 lb)   SpO2 98%   BMI 26.75 kg/m²       Physical Exam  Constitutional:       Appearance: Normal appearance.   HENT:      Head: " Normocephalic.   Cardiovascular:      Rate and Rhythm: Normal rate and regular rhythm.   Pulmonary:      Effort: Pulmonary effort is normal.      Breath sounds: Normal breath sounds.   Abdominal:      General: Abdomen is flat. Bowel sounds are normal.      Palpations: Abdomen is soft.   Musculoskeletal:         General: Normal range of motion.      Cervical back: Neck supple.      Right lower le+ Pitting Edema present.      Left lower le+ Pitting Edema present.   Skin:     General: Skin is warm and dry.   Neurological:      Mental Status: She is alert and oriented to person, place, and time.      Gait: Gait is intact.   Psychiatric:         Attention and Perception: Attention normal.         Mood and Affect: Mood normal.         Speech: Speech normal.          Result Review :                 Assessment and Plan    Diagnoses and all orders for this visit:    1. Edema, unspecified type (Primary)  Assessment & Plan:  Patient has not had any Lasix in a few days.  Advised that she take Lasix today due to extremity swelling.  Continue Lasix as needed swelling      2. Preventative health care  Assessment & Plan:  1.  Hep C screening  2.  Discussed DEXA scan, will revisit  3.  Mammogram due in     Orders:  -     CBC & Differential  -     Comprehensive Metabolic Panel  -     Hepatitis C Antibody  -     Lipid Panel  -     TSH  -     Urine Drug Screen - Urine, Clean Catch    3. Vitamin D deficiency  Assessment & Plan:  1.  Check vitamin D level    Orders:  -     Vitamin D 25 Hydroxy    4. Frequency of urination  Assessment & Plan:  1.  Urine dip + trace leukocytes, sent for culture  2.  Start Macrobid 100 mg twice daily x7 days  3.  We will call with results    Orders:  -     Urine Culture - Urine, Urine, Clean Catch    5. Major depressive disorder in full remission, unspecified whether recurrent (CMS/HCC)  Assessment & Plan:   Patient reports Wellbutrin started nearly 20 years ago during her divorce.  Patient does  not feel like she needs medication any longer and wants to discuss weaning off of medication.    1.  Take Wellbutrin 100 mg daily x10 days then every other day x10 days then stop  2.  Call with any signs or symptoms of depression or anxiety      6. Primary insomnia  Assessment & Plan:  1.  Continue Ambien nightly as needed  2.  Urine drug screen      7. Gastroesophageal reflux disease without esophagitis  Assessment & Plan:  1.  Continue omeprazole as prescribed      Other orders  -     furosemide (Lasix) 40 MG tablet; Take 1 tablet by mouth Daily As Needed (edema).  Dispense: 30 tablet; Refill: 1  -     buPROPion XL (Wellbutrin XL) 150 MG 24 hr tablet; Take 1 tablet by mouth Daily for 20 days.  Dispense: 20 tablet; Refill: 0  -     nitrofurantoin, macrocrystal-monohydrate, (Macrobid) 100 MG capsule; Take 1 capsule by mouth 2 (Two) Times a Day.  Dispense: 14 capsule; Refill: 0    I spent 30 minutes caring for Krystyna on this date of service. This time includes time spent by me in the following activities:preparing for the visit, reviewing tests, obtaining and/or reviewing a separately obtained history, performing a medically appropriate examination and/or evaluation , counseling and educating the patient/family/caregiver, ordering medications, tests, or procedures, documenting information in the medical record and care coordination  Follow Up   Return in about 6 months (around 10/13/2021).  Patient was given instructions and counseling regarding her condition or for health maintenance advice. Please see specific information pulled into the AVS if appropriate.

## 2021-04-13 NOTE — PATIENT INSTRUCTIONS
Wellbutrin  150mg daily x 10 days  Then 150mg every other day x 10 days   Then stop    Call with any signs/symptoms of depression or anxiety

## 2021-04-13 NOTE — ASSESSMENT & PLAN NOTE
1.  Urine dip + trace leukocytes, sent for culture  2.  Start Macrobid 100 mg twice daily x7 days  3.  We will call with results

## 2021-04-14 ENCOUNTER — TELEPHONE (OUTPATIENT)
Dept: FAMILY MEDICINE CLINIC | Facility: CLINIC | Age: 71
End: 2021-04-14

## 2021-04-14 DIAGNOSIS — E87.6 HYPOKALEMIA: Primary | ICD-10-CM

## 2021-04-14 LAB
25(OH)D3 SERPL-MCNC: 45.5 NG/ML
ALBUMIN SERPL-MCNC: 4.1 G/DL (ref 3.5–5.2)
ALBUMIN/GLOB SERPL: 1.6 G/DL
ALP SERPL-CCNC: 95 U/L (ref 39–117)
ALT SERPL W P-5'-P-CCNC: 17 U/L (ref 1–33)
AMPHET+METHAMPHET UR QL: NEGATIVE
ANION GAP SERPL CALCULATED.3IONS-SCNC: 15.4 MMOL/L (ref 5–15)
AST SERPL-CCNC: 22 U/L (ref 1–32)
BARBITURATES UR QL SCN: NEGATIVE
BASOPHILS # BLD AUTO: 0.04 10*3/MM3 (ref 0–0.2)
BASOPHILS NFR BLD AUTO: 0.4 % (ref 0–1.5)
BENZODIAZ UR QL SCN: NEGATIVE
BILIRUB SERPL-MCNC: 0.3 MG/DL (ref 0–1.2)
BUN SERPL-MCNC: 9 MG/DL (ref 8–23)
BUN/CREAT SERPL: 11.7 (ref 7–25)
CALCIUM SPEC-SCNC: 8.7 MG/DL (ref 8.6–10.5)
CANNABINOIDS SERPL QL: NEGATIVE
CHLORIDE SERPL-SCNC: 103 MMOL/L (ref 98–107)
CHOLEST SERPL-MCNC: 206 MG/DL (ref 0–200)
CO2 SERPL-SCNC: 23.6 MMOL/L (ref 22–29)
COCAINE UR QL: NEGATIVE
CREAT SERPL-MCNC: 0.77 MG/DL (ref 0.57–1)
DEPRECATED RDW RBC AUTO: 40.6 FL (ref 37–54)
EOSINOPHIL # BLD AUTO: 0.13 10*3/MM3 (ref 0–0.4)
EOSINOPHIL NFR BLD AUTO: 1.4 % (ref 0.3–6.2)
ERYTHROCYTE [DISTWIDTH] IN BLOOD BY AUTOMATED COUNT: 12.7 % (ref 12.3–15.4)
GFR SERPL CREATININE-BSD FRML MDRD: 74 ML/MIN/1.73
GLOBULIN UR ELPH-MCNC: 2.5 GM/DL
GLUCOSE SERPL-MCNC: 68 MG/DL (ref 65–99)
HCT VFR BLD AUTO: 41.2 % (ref 34–46.6)
HCV AB SER DONR QL: NORMAL
HDLC SERPL-MCNC: 58 MG/DL (ref 40–60)
HGB BLD-MCNC: 14 G/DL (ref 12–15.9)
IMM GRANULOCYTES # BLD AUTO: 0.04 10*3/MM3 (ref 0–0.05)
IMM GRANULOCYTES NFR BLD AUTO: 0.4 % (ref 0–0.5)
LDLC SERPL CALC-MCNC: 131 MG/DL (ref 0–100)
LDLC/HDLC SERPL: 2.22 {RATIO}
LYMPHOCYTES # BLD AUTO: 3.03 10*3/MM3 (ref 0.7–3.1)
LYMPHOCYTES NFR BLD AUTO: 32 % (ref 19.6–45.3)
MCH RBC QN AUTO: 30.2 PG (ref 26.6–33)
MCHC RBC AUTO-ENTMCNC: 34 G/DL (ref 31.5–35.7)
MCV RBC AUTO: 89 FL (ref 79–97)
METHADONE UR QL SCN: NEGATIVE
MONOCYTES # BLD AUTO: 0.81 10*3/MM3 (ref 0.1–0.9)
MONOCYTES NFR BLD AUTO: 8.6 % (ref 5–12)
NEUTROPHILS NFR BLD AUTO: 5.42 10*3/MM3 (ref 1.7–7)
NEUTROPHILS NFR BLD AUTO: 57.2 % (ref 42.7–76)
NRBC BLD AUTO-RTO: 0 /100 WBC (ref 0–0.2)
OPIATES UR QL: NEGATIVE
OXYCODONE UR QL SCN: NEGATIVE
PLATELET # BLD AUTO: 315 10*3/MM3 (ref 140–450)
PMV BLD AUTO: 11.6 FL (ref 6–12)
POTASSIUM SERPL-SCNC: 2.4 MMOL/L (ref 3.5–5.2)
PROT SERPL-MCNC: 6.6 G/DL (ref 6–8.5)
RBC # BLD AUTO: 4.63 10*6/MM3 (ref 3.77–5.28)
SODIUM SERPL-SCNC: 142 MMOL/L (ref 136–145)
TRIGL SERPL-MCNC: 96 MG/DL (ref 0–150)
TSH SERPL DL<=0.05 MIU/L-ACNC: 1.18 UIU/ML (ref 0.27–4.2)
VLDLC SERPL-MCNC: 17 MG/DL (ref 5–40)
WBC # BLD AUTO: 9.47 10*3/MM3 (ref 3.4–10.8)

## 2021-04-14 NOTE — TELEPHONE ENCOUNTER
Spoke with patient she did get the message and she will be in tomorrow around 1215 for a lab draw.

## 2021-04-14 NOTE — TELEPHONE ENCOUNTER
ON CALL NOTE  Received a call from [facility] lab stating that patients potassium level is 2.4. attempted to call pt and it went to voicemail. Left voicemail stating that if she is having any chest discomfort, heart palpitations, or muscle pain she needs to go to the ER. Also advised to start 40 MEQ of her 10MEQ tablets of potassium  BID for 3 days and to contact PCP first thing in the morning for further guidance.

## 2021-04-15 ENCOUNTER — LAB (OUTPATIENT)
Dept: FAMILY MEDICINE CLINIC | Facility: CLINIC | Age: 71
End: 2021-04-15

## 2021-04-15 DIAGNOSIS — E87.6 HYPOKALEMIA: ICD-10-CM

## 2021-04-15 PROCEDURE — 80048 BASIC METABOLIC PNL TOTAL CA: CPT | Performed by: NURSE PRACTITIONER

## 2021-04-15 PROCEDURE — 36415 COLL VENOUS BLD VENIPUNCTURE: CPT | Performed by: NURSE PRACTITIONER

## 2021-04-16 ENCOUNTER — TELEPHONE (OUTPATIENT)
Dept: FAMILY MEDICINE CLINIC | Facility: CLINIC | Age: 71
End: 2021-04-16

## 2021-04-16 DIAGNOSIS — E87.6 HYPOKALEMIA: Primary | ICD-10-CM

## 2021-04-16 LAB
ANION GAP SERPL CALCULATED.3IONS-SCNC: 14 MMOL/L (ref 5–15)
BACTERIA SPEC AEROBE CULT: ABNORMAL
BUN SERPL-MCNC: 9 MG/DL (ref 8–23)
BUN/CREAT SERPL: 9.9 (ref 7–25)
CALCIUM SPEC-SCNC: 9.4 MG/DL (ref 8.6–10.5)
CHLORIDE SERPL-SCNC: 102 MMOL/L (ref 98–107)
CO2 SERPL-SCNC: 27 MMOL/L (ref 22–29)
CREAT SERPL-MCNC: 0.91 MG/DL (ref 0.57–1)
GFR SERPL CREATININE-BSD FRML MDRD: 61 ML/MIN/1.73
GLUCOSE SERPL-MCNC: 80 MG/DL (ref 65–99)
POTASSIUM SERPL-SCNC: 2.5 MMOL/L (ref 3.5–5.2)
SODIUM SERPL-SCNC: 143 MMOL/L (ref 136–145)

## 2021-04-16 NOTE — TELEPHONE ENCOUNTER
Notified by lab overnight of pt's K 2.5, which was a recheck from 4/13 when K was found to be 2.4. At that time she was not on diuretics, but had swelling and was instructed to start lasix. She states only uses lasix prn and has been months since she has used it until the visit on 4/13 she took two days of lasix on 4/13 and 4/14. Her swelling has now subsided. Since 4/14 she has been taking 40meq daily. She does report slight lightheadedness and palpitation this am that subsided after 15 seconds, She was instructed to increase kcl to 80meq 4/16 and 4/17, resume 40meq on 4/18 and come to office for BMP on Monday 4/19. I also recommended if she has any further palpitations/chest pain, soa, etc to go to the ED.

## 2021-04-18 DIAGNOSIS — E87.6 HYPOKALEMIA: Primary | ICD-10-CM

## 2021-04-18 NOTE — PROGRESS NOTES
K+ remains very low. I want patient to take 40mEq daily x 5 days. I want her to have labs repeated Wednesday to ensure levels increasing. Go to ER with chest pain or palpitations.

## 2021-04-19 ENCOUNTER — TELEPHONE (OUTPATIENT)
Dept: FAMILY MEDICINE CLINIC | Facility: CLINIC | Age: 71
End: 2021-04-19

## 2021-04-19 NOTE — TELEPHONE ENCOUNTER
Caller: Krystyna Fabian    Relationship: Self    Best call back number: [phone]     Caller requesting test results:     What test was performed: LABS    When was the test performed: 04/15/2021    Where was the test performed: IN OFFICE     Additional notes: PLEASE CALL PATIENT BACK

## 2021-04-19 NOTE — TELEPHONE ENCOUNTER
Called patient and spoke to her about her lab work. She will be back in Wed afternoon to have lab completed.

## 2021-04-22 ENCOUNTER — LAB (OUTPATIENT)
Dept: FAMILY MEDICINE CLINIC | Facility: CLINIC | Age: 71
End: 2021-04-22

## 2021-04-29 ENCOUNTER — OFFICE VISIT (OUTPATIENT)
Dept: FAMILY MEDICINE CLINIC | Facility: CLINIC | Age: 71
End: 2021-04-29

## 2021-04-29 VITALS
WEIGHT: 147 LBS | DIASTOLIC BLOOD PRESSURE: 57 MMHG | BODY MASS INDEX: 26.05 KG/M2 | SYSTOLIC BLOOD PRESSURE: 95 MMHG | HEIGHT: 63 IN | OXYGEN SATURATION: 100 % | HEART RATE: 85 BPM | TEMPERATURE: 97.7 F

## 2021-04-29 DIAGNOSIS — E87.6 HYPOKALEMIA: ICD-10-CM

## 2021-04-29 DIAGNOSIS — J01.90 ACUTE NON-RECURRENT SINUSITIS, UNSPECIFIED LOCATION: Primary | ICD-10-CM

## 2021-04-29 PROCEDURE — 36415 COLL VENOUS BLD VENIPUNCTURE: CPT | Performed by: NURSE PRACTITIONER

## 2021-04-29 PROCEDURE — 80048 BASIC METABOLIC PNL TOTAL CA: CPT | Performed by: NURSE PRACTITIONER

## 2021-04-29 PROCEDURE — 99213 OFFICE O/P EST LOW 20 MIN: CPT | Performed by: NURSE PRACTITIONER

## 2021-04-29 RX ORDER — AMOXICILLIN 500 MG/1
500 CAPSULE ORAL 2 TIMES DAILY
Qty: 20 CAPSULE | Refills: 0 | Status: SHIPPED | OUTPATIENT
Start: 2021-04-29 | End: 2021-05-27

## 2021-04-29 NOTE — PROGRESS NOTES
"Chief Complaint  Sinusitis (possible sinus infection, congestion, green drainage and facial pressure)    Subjective          Krystyna Fabian presents to [facility]  History of Present Illness    Patient c/o sinus pressure, headache, sore throat, nasal congestion, ear pain, and productive cough for the last five days. She denies fever, chest pain, dyspnea.  Patient denies any known exposure to illness.    Patient also here to have repeat labs due to history of hypokalemia.  Her last potassium level on 4/15 was 2.5.  She is currently taking potassium chloride 40mEq daily.    Objective   Vital Signs:   BP 95/57 (BP Location: Left arm, Patient Position: Sitting, Cuff Size: Adult)   Pulse 85   Temp 97.7 °F (36.5 °C) (Skin)   Ht 160 cm (63\")   Wt 66.7 kg (147 lb)   SpO2 100%   BMI 26.04 kg/m²       Physical Exam  Constitutional:       Appearance: Normal appearance.   HENT:      Head: Normocephalic.      Right Ear: Tympanic membrane and ear canal normal.      Left Ear: Tympanic membrane and ear canal normal.      Mouth/Throat:      Pharynx: Posterior oropharyngeal erythema present. No pharyngeal swelling or oropharyngeal exudate.   Cardiovascular:      Rate and Rhythm: Normal rate and regular rhythm.   Pulmonary:      Effort: Pulmonary effort is normal.      Breath sounds: Normal breath sounds.   Abdominal:      General: Abdomen is flat. Bowel sounds are normal.      Palpations: Abdomen is soft.   Musculoskeletal:         General: Normal range of motion.      Cervical back: Neck supple.      Right lower leg: No edema.      Left lower leg: No edema.   Skin:     General: Skin is warm and dry.   Neurological:      Mental Status: She is alert and oriented to person, place, and time.      Gait: Gait is intact.   Psychiatric:         Attention and Perception: Attention normal.         Mood and Affect: Mood normal.         Speech: Speech normal.        Result Review :     BMP    BMP " 11/9/20 4/13/21 4/15/21   BUN  9 9   Creatinine  0.77 0.91   Sodium  142 143   Potassium 3.7 2.4 (A) 2.5 (A)   Chloride  103 102   CO2  23.6 27.0   Calcium  8.7 9.4   (A) Abnormal value                      Assessment and Plan    Diagnoses and all orders for this visit:    1. Acute non-recurrent sinusitis, unspecified location (Primary)  Assessment & Plan:  1.  Amoxicillin 500 mg twice daily for 10 days  2.  Call if symptoms persist or worsen      2. Hypokalemia  Assessment & Plan:  1.  Continue potassium 40 mEq daily  2.  Repeat BMP today    Orders:  -     Basic Metabolic Panel    Other orders  -     amoxicillin (AMOXIL) 500 MG capsule; Take 1 capsule by mouth 2 (Two) Times a Day.  Dispense: 20 capsule; Refill: 0    I spent 20 minutes caring for Krystyna on this date of service. This time includes time spent by me in the following activities:preparing for the visit, reviewing tests, obtaining and/or reviewing a separately obtained history, performing a medically appropriate examination and/or evaluation , counseling and educating the patient/family/caregiver, ordering medications, tests, or procedures and documenting information in the medical record  Follow Up   Return if symptoms worsen or fail to improve.  Patient was given instructions and counseling regarding her condition or for health maintenance advice. Please see specific information pulled into the AVS if appropriate.

## 2021-04-30 ENCOUNTER — TELEPHONE (OUTPATIENT)
Dept: FAMILY MEDICINE CLINIC | Facility: CLINIC | Age: 71
End: 2021-04-30

## 2021-04-30 DIAGNOSIS — E87.6 HYPOKALEMIA: Primary | ICD-10-CM

## 2021-04-30 LAB
ANION GAP SERPL CALCULATED.3IONS-SCNC: 11.4 MMOL/L (ref 5–15)
BUN SERPL-MCNC: 10 MG/DL (ref 8–23)
BUN/CREAT SERPL: 10.8 (ref 7–25)
CALCIUM SPEC-SCNC: 8.8 MG/DL (ref 8.6–10.5)
CHLORIDE SERPL-SCNC: 105 MMOL/L (ref 98–107)
CO2 SERPL-SCNC: 21.6 MMOL/L (ref 22–29)
CREAT SERPL-MCNC: 0.93 MG/DL (ref 0.57–1)
GFR SERPL CREATININE-BSD FRML MDRD: 60 ML/MIN/1.73
GLUCOSE SERPL-MCNC: 80 MG/DL (ref 65–99)
POTASSIUM SERPL-SCNC: 3.4 MMOL/L (ref 3.5–5.2)
SODIUM SERPL-SCNC: 138 MMOL/L (ref 136–145)

## 2021-05-06 DIAGNOSIS — F51.01 PRIMARY INSOMNIA: ICD-10-CM

## 2021-05-06 RX ORDER — OMEPRAZOLE 40 MG/1
40 CAPSULE, DELAYED RELEASE ORAL 2 TIMES DAILY
Qty: 180 CAPSULE | Refills: 1 | Status: SHIPPED | OUTPATIENT
Start: 2021-05-06 | End: 2021-10-25

## 2021-05-06 RX ORDER — QUETIAPINE FUMARATE 50 MG/1
50 TABLET, FILM COATED ORAL
Qty: 90 TABLET | Refills: 1 | Status: SHIPPED | OUTPATIENT
Start: 2021-05-06 | End: 2021-10-25

## 2021-05-06 RX ORDER — TIZANIDINE 4 MG/1
8 TABLET ORAL NIGHTLY
Qty: 180 TABLET | Refills: 1 | Status: SHIPPED | OUTPATIENT
Start: 2021-05-06 | End: 2021-06-25

## 2021-05-11 RX ORDER — POTASSIUM CHLORIDE 750 MG/1
TABLET, FILM COATED, EXTENDED RELEASE ORAL
Qty: 60 TABLET | Refills: 1 | Status: SHIPPED | OUTPATIENT
Start: 2021-05-11 | End: 2021-06-28

## 2021-05-27 ENCOUNTER — OFFICE VISIT (OUTPATIENT)
Dept: PODIATRY | Facility: CLINIC | Age: 71
End: 2021-05-27

## 2021-05-27 VITALS
HEIGHT: 63 IN | HEART RATE: 79 BPM | SYSTOLIC BLOOD PRESSURE: 120 MMHG | BODY MASS INDEX: 26.05 KG/M2 | DIASTOLIC BLOOD PRESSURE: 77 MMHG | WEIGHT: 147 LBS

## 2021-05-27 DIAGNOSIS — M77.41 METATARSALGIA OF RIGHT FOOT: ICD-10-CM

## 2021-05-27 DIAGNOSIS — M79.671 RIGHT FOOT PAIN: Primary | ICD-10-CM

## 2021-05-27 PROCEDURE — 99213 OFFICE O/P EST LOW 20 MIN: CPT | Performed by: PODIATRIST

## 2021-05-27 RX ORDER — METHYLPREDNISOLONE 4 MG/1
TABLET ORAL
Qty: 21 TABLET | Refills: 0 | Status: SHIPPED | OUTPATIENT
Start: 2021-05-27 | End: 2021-06-11

## 2021-05-27 NOTE — PROGRESS NOTES
05/27/2021  Foot and Ankle Surgery - Established Patient/Follow-up  Provider: Dr. Reginald Ro, DAJUAN  Location: [facility]    Subjective:  Krystyna Fabian is a 71 y.o. female.     Chief Complaint   Patient presents with   • Right Foot - Pain       HPI: Patient returns with new issue involving the right foot.  She complains of substantial discomfort involving the dorsum of the foot with weightbearing activities.  She is unaware of any injury.  She states that the issues have been present over the last 2 weeks.  She rates the pain an 8 out of 10 when noticed.  She has been relatively active and trying to walk for exercise.  She is concerned given her history with right foot but symptoms will progress and cause further limitation.    Allergies   Allergen Reactions   • Butorphanol Itching     stadol   • Erythromycin Other (See Comments)     CAUSES HYPERTENSION         Current Outpatient Medications on File Prior to Visit   Medication Sig Dispense Refill   • Ascorbic Acid (VITAMIN C PO) Take  by mouth.     • docusate sodium 100 MG capsule Take 100 mg by mouth.     • furosemide (Lasix) 40 MG tablet Take 1 tablet by mouth Daily As Needed (edema). 30 tablet 1   • Multiple Vitamins-Minerals (ZINC PO) Take  by mouth.     • omeprazole (priLOSEC) 40 MG capsule Take 1 capsule by mouth 2 (Two) Times a Day. 180 capsule 1   • potassium chloride (KLOR-CON) 10 MEQ CR tablet 2 po q d prn when take lasix 60 tablet 1   • QUEtiapine (SEROquel) 50 MG tablet Take 1 tablet by mouth every night at bedtime. 90 tablet 1   • tiZANidine (ZANAFLEX) 4 MG tablet Take 2 tablets by mouth Every Night. 180 tablet 1   • VITAMIN D, CHOLECALCIFEROL, PO Take 1 tablet by mouth Daily.     • VITAMIN E PO Take  by mouth.     • zolpidem (AMBIEN) 5 MG tablet TAKE ONE TABLET BY MOUTH EVERY NIGHT AT BEDTIME AS NEEDED FOR SLEEP 30 tablet 5   • [DISCONTINUED] amoxicillin (AMOXIL) 500 MG capsule Take 1 capsule by mouth 2 (Two) Times a Day. 20 capsule  "0   • [DISCONTINUED] buPROPion XL (Wellbutrin XL) 150 MG 24 hr tablet Take 1 tablet by mouth Daily for 20 days. 20 tablet 0   • [DISCONTINUED] nitrofurantoin, macrocrystal-monohydrate, (Macrobid) 100 MG capsule Take 1 capsule by mouth 2 (Two) Times a Day. 14 capsule 0     Current Facility-Administered Medications on File Prior to Visit   Medication Dose Route Frequency Provider Last Rate Last Admin   • triamcinolone acetonide (KENALOG-40) injection 80 mg  80 mg Intramuscular Once Sadiq Parker MD           Objective   /77   Pulse 79   Ht 160 cm (63\")   Wt 66.7 kg (147 lb)   BMI 26.04 kg/m²     Podiatry Exam       General Appearance:  well nourished; well hydrated; no acute distress  Mental Status Exam        Judgement and Insight:  Intact       Orientation:  Oriented to time, place, and person  Cardiovascular (Right)       Dorsalis Pedis Pulse (Rt):  2/4       Posterior Tibialis Pulse (Rt):  2/4       Capillary Filling Time (Rt):  1-3 Seconds       Edema (Rt):  No Edema  Dermatological Exam       Temperature:  warm to warm       Skin Elasticity:  normal skin elasticity  Incision site is well-healed.  Neurological Exam (Right)       Paresthesia (Rt):  negative       Achilles DTRs (Rt):  symmetric       Tinel over Tarsal Tunnel (Rt):  negative     MusculoSkeletal Exam (Right)       Gait and Stance (Rt):   mildly antalgic       ROM (Rt):   no pain or limitation with range of motion of the first metatarsophalangeal joint.  Other joints are unremarkable       Muscle Strength (Rt):  symmetrical 5/5; mild guarding       Subluxed Digits (Rt):  no subluxation or laxity of joints       Dislocated Joints (Rt):  no dislocation of joints       Inflammed Joints (Rt):  no inflammation of joints       Hammertoe Deformity (Rt):  none       Claw Toe Deformity (Rt):  none       Medial Turbicle Calc (Rt): Mild discomfort       Gastroc soleus equinus (Rt):  Inability to dorsiflex past neutral position both straight legged " and bent knee       Post tibial tendon (Rt):  no soreness noted       Peroneal Tendon (Rt):  no soreness noted       Capsulitis of the MPJs (Rt):  no soreness noted    Moderate discomfort with palpation involving the dorsum of the forefoot.  No significant deformity or instability.  No gross signs of inflammation.    Assessment/Plan   Diagnoses and all orders for this visit:    1. Right foot pain (Primary)  -     XR Foot 3+ View Right  -     methylPREDNISolone (MEDROL) 4 MG dose pack; Take as directed  Dispense: 21 tablet; Refill: 0    2. Metatarsalgia of right foot      Patient presents with prominent discomfort involving the forefoot.  She notes the discomfort to the metatarsal region.  She states that she has been relatively active with increased walking.  Imaging was obtained and independently reviewed today showing no obvious fractures or dislocations.  I explained that she could be suffering from a stress response which would be a precursor to a stress fracture.  I have recommended that she return to her cam boot with weightbearing activities.  I have prescribed a Medrol Dosepak for symptom management.  She is to decrease her activities and start stretching manual therapy exercises.  I would like to see her in 2 weeks for reevaluation and imaging.    Orders Placed This Encounter   Procedures   • XR Foot 3+ View Right     Weight Bearing     Order Specific Question:   Reason for Exam:     Answer:   Right foot pain on the top of the foot NKI RM 13 WB          Note is dictated utilizing voice recognition software. Unfortunately this leads to occasional typographical errors. I apologize in advance if the situation occurs. If questions occur please do not hesitate to call our office.

## 2021-06-09 RX ORDER — FUROSEMIDE 40 MG/1
TABLET ORAL
Qty: 30 TABLET | Refills: 0 | Status: SHIPPED | OUTPATIENT
Start: 2021-06-09 | End: 2021-06-25

## 2021-06-10 ENCOUNTER — OFFICE VISIT (OUTPATIENT)
Dept: PODIATRY | Facility: CLINIC | Age: 71
End: 2021-06-10

## 2021-06-10 ENCOUNTER — TELEPHONE (OUTPATIENT)
Dept: ORTHOPEDIC SURGERY | Facility: CLINIC | Age: 71
End: 2021-06-10

## 2021-06-10 VITALS — BODY MASS INDEX: 26.05 KG/M2 | WEIGHT: 147 LBS | HEIGHT: 63 IN

## 2021-06-10 DIAGNOSIS — S92.334A CLOSED NONDISPLACED FRACTURE OF THIRD METATARSAL BONE OF RIGHT FOOT, INITIAL ENCOUNTER: ICD-10-CM

## 2021-06-10 DIAGNOSIS — M84.374A STRESS FRACTURE OF METATARSAL BONE OF RIGHT FOOT, INITIAL ENCOUNTER: ICD-10-CM

## 2021-06-10 DIAGNOSIS — M79.671 RIGHT FOOT PAIN: Primary | ICD-10-CM

## 2021-06-10 PROCEDURE — 99213 OFFICE O/P EST LOW 20 MIN: CPT | Performed by: PODIATRIST

## 2021-06-10 NOTE — TELEPHONE ENCOUNTER
Caller: PARISA MENDOZA    Relationship: SELF    Best call back number: [phone]    What form or medical record are you requesting: HANDICAP TAG    Additional notes: PATIENT WOULD LIKE A CALL BACK TO DISCUSS GETTING HANDICAP TAG/STICKER.

## 2021-06-10 NOTE — PROGRESS NOTES
06/10/2021  Foot and Ankle Surgery - Established Patient/Follow-up  Provider: Dr. eRginald Ro DPM  Location: [facility]    Subjective:  Krystyna Fabian is a 71 y.o. female.     Chief Complaint   Patient presents with   • Right Foot - Follow-up       HPI: Patient returns with continued pain involving the right foot.  She has been wearing the boot but continues to have prominent discomfort with weightbearing activity.  She continues to isolate the discomfort to the dorsal aspect of the forefoot.  No new issues.    Allergies   Allergen Reactions   • Butorphanol Itching     stadol   • Erythromycin Other (See Comments)     CAUSES HYPERTENSION         Current Outpatient Medications on File Prior to Visit   Medication Sig Dispense Refill   • Ascorbic Acid (VITAMIN C PO) Take  by mouth.     • docusate sodium 100 MG capsule Take 100 mg by mouth.     • furosemide (LASIX) 40 MG tablet TAKE ONE TABLET BY MOUTH DAILY AS NEEDED FOR EDEMA 30 tablet 0   • methylPREDNISolone (MEDROL) 4 MG dose pack Take as directed 21 tablet 0   • Multiple Vitamins-Minerals (ZINC PO) Take  by mouth.     • omeprazole (priLOSEC) 40 MG capsule Take 1 capsule by mouth 2 (Two) Times a Day. 180 capsule 1   • potassium chloride (KLOR-CON) 10 MEQ CR tablet 2 po q d prn when take lasix 60 tablet 1   • QUEtiapine (SEROquel) 50 MG tablet Take 1 tablet by mouth every night at bedtime. 90 tablet 1   • tiZANidine (ZANAFLEX) 4 MG tablet Take 2 tablets by mouth Every Night. 180 tablet 1   • VITAMIN D, CHOLECALCIFEROL, PO Take 1 tablet by mouth Daily.     • VITAMIN E PO Take  by mouth.     • zolpidem (AMBIEN) 5 MG tablet TAKE ONE TABLET BY MOUTH EVERY NIGHT AT BEDTIME AS NEEDED FOR SLEEP 30 tablet 5     Current Facility-Administered Medications on File Prior to Visit   Medication Dose Route Frequency Provider Last Rate Last Admin   • triamcinolone acetonide (KENALOG-40) injection 80 mg  80 mg Intramuscular Once Sadiq Parker MD      "      Objective   Ht 160 cm (63\")   Wt 66.7 kg (147 lb)   BMI 26.04 kg/m²     Podiatry Exam       General Appearance:  well nourished; well hydrated; no acute distress  Mental Status Exam        Judgement and Insight:  Intact       Orientation:  Oriented to time, place, and person  Cardiovascular (Right)       Dorsalis Pedis Pulse (Rt):  2/4       Posterior Tibialis Pulse (Rt):  2/4       Capillary Filling Time (Rt):  1-3 Seconds       Edema (Rt):  No Edema  Dermatological Exam       Temperature:  warm to warm       Skin Elasticity:  normal skin elasticity  Incision site is well-healed.  Neurological Exam (Right)       Paresthesia (Rt):  negative       Achilles DTRs (Rt):  symmetric       Tinel over Tarsal Tunnel (Rt):  negative     MusculoSkeletal Exam (Right)       Gait and Stance (Rt):   mildly antalgic       ROM (Rt):   no pain or limitation with range of motion of the first metatarsophalangeal joint.  Other joints are unremarkable       Muscle Strength (Rt):  symmetrical 5/5; mild guarding       Subluxed Digits (Rt):  no subluxation or laxity of joints       Dislocated Joints (Rt):  no dislocation of joints       Inflammed Joints (Rt):  no inflammation of joints       Hammertoe Deformity (Rt):  none       Claw Toe Deformity (Rt):  none       Medial Turbicle Calc (Rt): Mild discomfort       Gastroc soleus equinus (Rt):  Inability to dorsiflex past neutral position both straight legged and bent knee       Post tibial tendon (Rt):  no soreness noted       Peroneal Tendon (Rt):  no soreness noted       Capsulitis of the MPJs (Rt):  no soreness noted     Continued discomfort with palpation to the dorsal aspect of the forefoot.  No progressive deformity or instability.  Mild edema noted to the foot today    Assessment/Plan   Diagnoses and all orders for this visit:    1. Right foot pain (Primary)  -     XR Foot 3+ View Right    2. Closed nondisplaced fracture of third metatarsal bone of right foot, initial " encounter    3. Stress fracture of metatarsal bone of right foot, initial encounter      Patient returns with continued pain involving the right foot.  Imaging was obtained and independently reviewed showing nondisplaced fracture involving the mid diaphyseal region of the third metatarsal consistent with stress fracture.  I did discuss the imaging, diagnosis, and treatment options at length.  At this time, I have recommended that she proceed with nonweightbearing activity.  I do feel that this will help her symptoms and allow for appropriate healing.  She may remain in the cam boot for added protection.  I would like her to proceed with rice therapy and we did discuss proper use of OTC anti-inflammatories.  I would like to see her in 4 weeks for reevaluation and imaging.    Orders Placed This Encounter   Procedures   • XR Foot 3+ View Right     RM 14, follow up right foot pain     Order Specific Question:   Reason for Exam:     Answer:   right foot pain     Order Specific Question:   Does this patient have a diabetic monitoring/medication delivering device on?     Answer:   No     Order Specific Question:   Release to patient     Answer:   Immediate          Note is dictated utilizing voice recognition software. Unfortunately this leads to occasional typographical errors. I apologize in advance if the situation occurs. If questions occur please do not hesitate to call our office.

## 2021-06-11 ENCOUNTER — OFFICE VISIT (OUTPATIENT)
Dept: FAMILY MEDICINE CLINIC | Facility: CLINIC | Age: 71
End: 2021-06-11

## 2021-06-11 VITALS
DIASTOLIC BLOOD PRESSURE: 79 MMHG | BODY MASS INDEX: 25.52 KG/M2 | WEIGHT: 144 LBS | HEIGHT: 63 IN | HEART RATE: 87 BPM | SYSTOLIC BLOOD PRESSURE: 115 MMHG | OXYGEN SATURATION: 100 %

## 2021-06-11 DIAGNOSIS — M62.838 MUSCLE SPASM: Primary | ICD-10-CM

## 2021-06-11 PROCEDURE — 83735 ASSAY OF MAGNESIUM: CPT | Performed by: NURSE PRACTITIONER

## 2021-06-11 PROCEDURE — 85027 COMPLETE CBC AUTOMATED: CPT | Performed by: NURSE PRACTITIONER

## 2021-06-11 PROCEDURE — 36415 COLL VENOUS BLD VENIPUNCTURE: CPT | Performed by: NURSE PRACTITIONER

## 2021-06-11 PROCEDURE — 99213 OFFICE O/P EST LOW 20 MIN: CPT | Performed by: NURSE PRACTITIONER

## 2021-06-11 PROCEDURE — 80053 COMPREHEN METABOLIC PANEL: CPT | Performed by: NURSE PRACTITIONER

## 2021-06-11 RX ORDER — BACLOFEN 10 MG/1
10 TABLET ORAL 3 TIMES DAILY PRN
Qty: 30 TABLET | Refills: 0 | Status: SHIPPED | OUTPATIENT
Start: 2021-06-11 | End: 2021-07-06

## 2021-06-11 NOTE — PROGRESS NOTES
"Chief Complaint  Leg Pain (with cramping [place] at night, starting a week ago, trying to drink more water but hasn't helped and stopped lasix.)    Subjective          Krystyna Fabian presents to [facility]  History of Present Illness    Patient here with c/o bilateral leg cramps, worse at night. She reports she stopped her Lasix but has continued K+. Patient reports she has increased water intake but does not seem to help.  Patient does have history of hypokalemia.  She is taking tizanidine for chronic muscle spasms at night with no relief.  No other known aggravating or alleviating factors.  Patient denies dizziness, headache, palpitations, swelling, syncope or dyspnea.  She does report a brief moment of sharp chest pain that resolved within seconds but was not accompanied by other symptoms.  Pain does not return.    Objective   Vital Signs:   /79 (BP Location: Right arm, Patient Position: Sitting, Cuff Size: Adult)   Pulse 87   Ht 160 cm (62.99\")   Wt 65.3 kg (144 lb)   SpO2 100%   BMI 25.51 kg/m²       Physical Exam  Constitutional:       Appearance: Normal appearance.   HENT:      Head: Normocephalic.   Cardiovascular:      Rate and Rhythm: Normal rate and regular rhythm.   Pulmonary:      Effort: Pulmonary effort is normal.      Breath sounds: Normal breath sounds.   Abdominal:      General: Abdomen is flat. Bowel sounds are normal.      Palpations: Abdomen is soft.   Musculoskeletal:         General: Normal range of motion.      Cervical back: Neck supple.      Right lower leg: No edema.      Left lower leg: No edema.   Skin:     General: Skin is warm and dry.   Neurological:      Mental Status: She is alert and oriented to person, place, and time.      Gait: Gait is intact.   Psychiatric:         Attention and Perception: Attention normal.         Mood and Affect: Mood normal.         Speech: Speech normal.        Result Review :     CMP    CMP 4/13/21 4/15/21 " 4/29/21   Glucose 68 80 80   BUN 9 9 10   Creatinine 0.77 0.91 0.93   eGFR Non  Am 74 61 60 (A)   Sodium 142 143 138   Potassium 2.4 (A) 2.5 (A) 3.4 (A)   Chloride 103 102 105   Calcium 8.7 9.4 8.8   Albumin 4.10     Total Bilirubin 0.3     Alkaline Phosphatase 95     AST (SGOT) 22     ALT (SGPT) 17     (A) Abnormal value                      Assessment and Plan    Diagnoses and all orders for this visit:    1. Muscle spasm (Primary)  Assessment & Plan:  1.  Check labs  2.  Discontinue tizanidine  3.  Start baclofen 10 mg 3 times daily as needed  4.  Encourage patient to remain hydrated  5.  We will call with results  6.  Follow-up in 2 weeks    Orders:  -     CBC (No Diff)  -     Comprehensive Metabolic Panel  -     Magnesium    Other orders  -     baclofen (LIORESAL) 10 MG tablet; Take 1 tablet by mouth 3 (Three) Times a Day As Needed for Muscle Spasms.  Dispense: 30 tablet; Refill: 0    I spent 20 minutes caring for Krystyna on this date of service. This time includes time spent by me in the following activities:preparing for the visit, reviewing tests, obtaining and/or reviewing a separately obtained history, performing a medically appropriate examination and/or evaluation , counseling and educating the patient/family/caregiver, ordering medications, tests, or procedures and documenting information in the medical record  Follow Up   Return in about 2 weeks (around 6/25/2021).  Patient was given instructions and counseling regarding her condition or for health maintenance advice. Please see specific information pulled into the AVS if appropriate.

## 2021-06-11 NOTE — ASSESSMENT & PLAN NOTE
1.  Check labs  2.  Discontinue tizanidine  3.  Start baclofen 10 mg 3 times daily as needed  4.  Encourage patient to remain hydrated  5.  We will call with results  6.  Follow-up in 2 weeks

## 2021-06-12 LAB
ALBUMIN SERPL-MCNC: 4.4 G/DL (ref 3.5–5.2)
ALBUMIN/GLOB SERPL: 1.8 G/DL
ALP SERPL-CCNC: 97 U/L (ref 39–117)
ALT SERPL W P-5'-P-CCNC: 17 U/L (ref 1–33)
ANION GAP SERPL CALCULATED.3IONS-SCNC: 11.4 MMOL/L (ref 5–15)
AST SERPL-CCNC: 25 U/L (ref 1–32)
BILIRUB SERPL-MCNC: 0.6 MG/DL (ref 0–1.2)
BUN SERPL-MCNC: 13 MG/DL (ref 8–23)
BUN/CREAT SERPL: 11.3 (ref 7–25)
CALCIUM SPEC-SCNC: 9.3 MG/DL (ref 8.6–10.5)
CHLORIDE SERPL-SCNC: 100 MMOL/L (ref 98–107)
CO2 SERPL-SCNC: 26.6 MMOL/L (ref 22–29)
CREAT SERPL-MCNC: 1.15 MG/DL (ref 0.57–1)
DEPRECATED RDW RBC AUTO: 42 FL (ref 37–54)
ERYTHROCYTE [DISTWIDTH] IN BLOOD BY AUTOMATED COUNT: 13 % (ref 12.3–15.4)
GFR SERPL CREATININE-BSD FRML MDRD: 47 ML/MIN/1.73
GLOBULIN UR ELPH-MCNC: 2.4 GM/DL
GLUCOSE SERPL-MCNC: 68 MG/DL (ref 65–99)
HCT VFR BLD AUTO: 43.4 % (ref 34–46.6)
HGB BLD-MCNC: 14.6 G/DL (ref 12–15.9)
MAGNESIUM SERPL-MCNC: 2.1 MG/DL (ref 1.6–2.4)
MCH RBC QN AUTO: 30 PG (ref 26.6–33)
MCHC RBC AUTO-ENTMCNC: 33.6 G/DL (ref 31.5–35.7)
MCV RBC AUTO: 89.3 FL (ref 79–97)
PLATELET # BLD AUTO: 304 10*3/MM3 (ref 140–450)
PMV BLD AUTO: 11.2 FL (ref 6–12)
POTASSIUM SERPL-SCNC: 3.4 MMOL/L (ref 3.5–5.2)
PROT SERPL-MCNC: 6.8 G/DL (ref 6–8.5)
RBC # BLD AUTO: 4.86 10*6/MM3 (ref 3.77–5.28)
SODIUM SERPL-SCNC: 138 MMOL/L (ref 136–145)
WBC # BLD AUTO: 10.87 10*3/MM3 (ref 3.4–10.8)

## 2021-06-14 NOTE — PROGRESS NOTES
K+ is still low at 3.4, likely contributing to her muscle spasms. I want her to increase her K+ to 10mEq twice daily. Also, her kidney function has decreased since last month. Is she taking any NSAIDS (Ibuprofen, Mobic, Naproxen, Aleve, Advil) routinely?

## 2021-06-14 NOTE — PROGRESS NOTES
Slow down on Ibuprofen, we will repeat labs at f/u. Increase K+ to 40mEq daily, she may take at once or BID

## 2021-06-25 ENCOUNTER — OFFICE VISIT (OUTPATIENT)
Dept: FAMILY MEDICINE CLINIC | Facility: CLINIC | Age: 71
End: 2021-06-25

## 2021-06-25 VITALS
DIASTOLIC BLOOD PRESSURE: 80 MMHG | BODY MASS INDEX: 26.05 KG/M2 | HEIGHT: 63 IN | HEART RATE: 87 BPM | SYSTOLIC BLOOD PRESSURE: 122 MMHG | OXYGEN SATURATION: 99 % | WEIGHT: 147 LBS

## 2021-06-25 DIAGNOSIS — M62.838 MUSCLE SPASM: Primary | ICD-10-CM

## 2021-06-25 PROCEDURE — 99213 OFFICE O/P EST LOW 20 MIN: CPT | Performed by: NURSE PRACTITIONER

## 2021-06-25 RX ORDER — GABAPENTIN 100 MG/1
100 CAPSULE ORAL NIGHTLY
Qty: 30 CAPSULE | Refills: 1 | Status: SHIPPED | OUTPATIENT
Start: 2021-06-25 | End: 2021-07-07

## 2021-06-25 NOTE — PROGRESS NOTES
"Chief Complaint  Follow-up (2 week follow up)    Subjective          Krystyna Fabian presents to [facility]  History of Present Illness    Patient presents for follow-up regarding muscle spasms to bilateral lower extremities. She reports that she continues to have muscle spasms that are worse at night. Patient is getting some relief from the baclofen reports she is only able to take at night due to side effect of significant drowsiness. Patient's magnesium level was normal. Her potassium level was mildly low at 3.4. Her supplemental potassium was increased to 40 mEq daily which she is taking as prescribed. Patient has not noticed any improvement in symptoms with that medication change.    Objective   Vital Signs:   /80 (BP Location: Left arm, Patient Position: Sitting, Cuff Size: Adult)   Pulse 87   Ht 160 cm (63\")   Wt 66.7 kg (147 lb)   SpO2 99%   BMI 26.04 kg/m²       Physical Exam  Constitutional:       Appearance: Normal appearance.   HENT:      Head: Normocephalic.   Cardiovascular:      Rate and Rhythm: Normal rate and regular rhythm.   Pulmonary:      Effort: Pulmonary effort is normal.      Breath sounds: Normal breath sounds.   Abdominal:      General: Abdomen is flat. Bowel sounds are normal.      Palpations: Abdomen is soft.   Musculoskeletal:         General: Normal range of motion.      Cervical back: Neck supple.      Right lower leg: No edema.      Left lower leg: No edema.   Skin:     General: Skin is warm and dry.   Neurological:      Mental Status: She is alert and oriented to person, place, and time.      Gait: Gait is intact.   Psychiatric:         Attention and Perception: Attention normal.         Mood and Affect: Mood normal.         Speech: Speech normal.        Result Review :     CMP    CMP 4/15/21 4/29/21 6/11/21   Glucose 80 80 68   BUN 9 10 13   Creatinine 0.91 0.93 1.15 (A)   eGFR Non  Am 61 60 (A) 47 (A)   Sodium 143 138 138 "   Potassium 2.5 (A) 3.4 (A) 3.4 (A)   Chloride 102 105 100   Calcium 9.4 8.8 9.3   Albumin   4.40   Total Bilirubin   0.6   Alkaline Phosphatase   97   AST (SGOT)   25   ALT (SGPT)   17   (A) Abnormal value            CBC    CBC 11/4/20 4/13/21 6/11/21   WBC 7.39 9.47 10.87 (A)   RBC 4.69 4.63 4.86   Hemoglobin 14.1 14.0 14.6   Hematocrit 41.5 41.2 43.4   MCV 88.5 89.0 89.3   MCH 30.1 30.2 30.0   MCHC 34.0 34.0 33.6   RDW 12.6 12.7 13.0   Platelets 313 315 304   (A) Abnormal value                      Assessment and Plan    Diagnoses and all orders for this visit:    1. Muscle spasm (Primary)  Assessment & Plan:  1. Labs reviewed  2. Continue potassium 40 mEq daily  3.  Continue baclofen 10 mg 3 times daily as needed for spasms  4. Push water  5. Stretch 1-2 times daily  6. Add gabapentin 100 mg nightly      Other orders  -     gabapentin (NEURONTIN) 100 MG capsule; Take 1 capsule by mouth Every Night.  Dispense: 30 capsule; Refill: 1    I spent 20 minutes caring for Krystyna on this date of service. This time includes time spent by me in the following activities:preparing for the visit, reviewing tests, obtaining and/or reviewing a separately obtained history, performing a medically appropriate examination and/or evaluation , counseling and educating the patient/family/caregiver, ordering medications, tests, or procedures, documenting information in the medical record and care coordination  Follow Up   Return in about 4 weeks (around 7/23/2021).  Patient was given instructions and counseling regarding her condition or for health maintenance advice. Please see specific information pulled into the AVS if appropriate.       Answers for HPI/ROS submitted by the patient on 6/18/2021  What is the primary reason for your visit?: Physical

## 2021-06-25 NOTE — ASSESSMENT & PLAN NOTE
1. Labs reviewed  2. Continue potassium 40 mEq daily  3.  Continue baclofen 10 mg 3 times daily as needed for spasms  4. Push water  5. Stretch 1-2 times daily  6. Add gabapentin 100 mg nightly

## 2021-06-25 NOTE — PATIENT INSTRUCTIONS
Leg Cramps  Leg cramps occur when one or more muscles tighten and you have no control over this tightening (involuntary muscle contraction). Muscle cramps can develop in any muscle, but the most common place is in the calf muscles of the leg. Those cramps can occur during exercise or when you are at rest. Leg cramps are painful, and they may last for a few seconds to a few minutes. Cramps may return several times before they finally stop.  Usually, leg cramps are not caused by a serious medical problem. In many cases, the cause is not known. Some common causes include:  · Excessive physical effort (overexertion), such as during intense exercise.  · Overuse from repetitive motions, or doing the same thing over and over.  · Staying in a certain position for a long period of time.  · Improper preparation, form, or technique while performing a sport or an activity.  · Dehydration.  · Injury.  · Side effects of certain medicines.  · Abnormally low levels of minerals in your blood (electrolytes), especially potassium and calcium. This could result from:  ? Pregnancy.  ? Taking diuretic medicines.  Follow these instructions at home:  Eating and drinking  · Drink enough fluid to keep your urine pale yellow. Staying hydrated may help prevent cramps.  · Eat a healthy diet that includes plenty of nutrients to help your muscles function. A healthy diet includes fruits and vegetables, lean protein, whole grains, and low-fat or nonfat dairy products.  Managing pain, stiffness, and swelling         · Try massaging, stretching, and relaxing the affected muscle. Do this for several minutes at a time.  · If directed, put ice on areas that are sore or painful after a cramp:  ? Put ice in a plastic bag.  ? Place a towel between your skin and the bag.  ? Leave the ice on for 20 minutes, 2-3 times a day.  · If directed, apply heat to muscles that are tense or tight. Do this before you exercise, or as often as told by your health care  provider. Use the heat source that your health care provider recommends, such as a moist heat pack or a heating pad.  ? Place a towel between your skin and the heat source.  ? Leave the heat on for 20-30 minutes.  ? Remove the heat if your skin turns bright red. This is especially important if you are unable to feel pain, heat, or cold. You may have a greater risk of getting burned.  · Try taking hot showers or baths to help relax tight muscles.  General instructions  · If you are having frequent leg cramps, avoid intense exercise for several days.  · Take over-the-counter and prescription medicines only as told by your health care provider.  · Keep all follow-up visits as told by your health care provider. This is important.  Contact a health care provider if:  · Your leg cramps get more severe or more frequent, or they do not improve over time.  · Your foot becomes cold, numb, or blue.  Summary  · Muscle cramps can develop in any muscle, but the most common place is in the calf muscles of the leg.  · Leg cramps are painful, and they may last for a few seconds to a few minutes.  · Usually, leg cramps are not caused by a serious medical problem. Often, the cause is not known.  · Stay hydrated and take over-the-counter and prescription medicines only as told by your health care provider.  This information is not intended to replace advice given to you by your health care provider. Make sure you discuss any questions you have with your health care provider.  Document Revised: 11/30/2018 Document Reviewed: 09/27/2018  [employer] Patient Education © 2021 [employer]

## 2021-06-28 RX ORDER — POTASSIUM CHLORIDE 750 MG/1
TABLET, FILM COATED, EXTENDED RELEASE ORAL
Qty: 60 TABLET | Refills: 0 | Status: SHIPPED | OUTPATIENT
Start: 2021-06-28 | End: 2021-07-07

## 2021-06-29 ENCOUNTER — TELEPHONE (OUTPATIENT)
Dept: FAMILY MEDICINE CLINIC | Facility: CLINIC | Age: 71
End: 2021-06-29

## 2021-06-29 DIAGNOSIS — F51.01 PRIMARY INSOMNIA: ICD-10-CM

## 2021-06-29 RX ORDER — ZOLPIDEM TARTRATE 5 MG/1
5 TABLET ORAL NIGHTLY PRN
Qty: 30 TABLET | Refills: 1 | Status: SHIPPED | OUTPATIENT
Start: 2021-06-29 | End: 2021-07-26 | Stop reason: SDUPTHER

## 2021-06-29 NOTE — TELEPHONE ENCOUNTER
Caller: Krystyna Fabian    Relationship: Self    Best call back number:  [phone]    Medication needed:   Requested Prescriptions     Pending Prescriptions Disp Refills   • zolpidem (AMBIEN) 5 MG tablet 30 tablet 5     Sig: Take 1 tablet by mouth At Night As Needed for Sleep.   potassium chloride 10 MEQ CR tablet - PATIENT TAKES 2 IN THE MORNING AND 2 IN THE EVENING     When do you need the refill by: ASAP     What additional details did the patient provide when requesting the medication: PATIENT IS OUT OF MEDICATION   Does the patient have less than a 3 day supply:  [x] Yes  [] No    What is the patient's preferred pharmacy: [facility] [address] [place] [address] [address] [facility] AT [facility] - [phone] [facility] [phone] [facility]

## 2021-07-06 RX ORDER — BACLOFEN 10 MG/1
TABLET ORAL
Qty: 30 TABLET | Refills: 0 | Status: SHIPPED | OUTPATIENT
Start: 2021-07-06 | End: 2021-07-26 | Stop reason: SDUPTHER

## 2021-07-07 ENCOUNTER — OFFICE VISIT (OUTPATIENT)
Dept: PODIATRY | Facility: CLINIC | Age: 71
End: 2021-07-07

## 2021-07-07 VITALS
DIASTOLIC BLOOD PRESSURE: 65 MMHG | HEIGHT: 63 IN | SYSTOLIC BLOOD PRESSURE: 91 MMHG | BODY MASS INDEX: 26.05 KG/M2 | HEART RATE: 85 BPM | WEIGHT: 147 LBS

## 2021-07-07 DIAGNOSIS — M84.374D STRESS FRACTURE OF METATARSAL BONE OF RIGHT FOOT WITH ROUTINE HEALING, SUBSEQUENT ENCOUNTER: ICD-10-CM

## 2021-07-07 DIAGNOSIS — M79.671 RIGHT FOOT PAIN: Primary | ICD-10-CM

## 2021-07-07 DIAGNOSIS — S92.334D CLOSED NONDISPLACED FRACTURE OF THIRD METATARSAL BONE OF RIGHT FOOT WITH ROUTINE HEALING, SUBSEQUENT ENCOUNTER: ICD-10-CM

## 2021-07-07 PROCEDURE — 99213 OFFICE O/P EST LOW 20 MIN: CPT | Performed by: PODIATRIST

## 2021-07-08 NOTE — PROGRESS NOTES
"07/07/2021  Foot and Ankle Surgery - Established Patient/Follow-up  Provider: Dr. Reginald Ro DPM  Location: [facility]    Subjective:  Krystyna Fabian is a 71 y.o. female.     Chief Complaint   Patient presents with   • Right Foot - Follow-up       HPI: Patient returns for follow-up on her right foot.  She states that she is doing quite well.  She has noticed improvement in pain.  She has been ambulating the cam boot.  No other issues today    Allergies   Allergen Reactions   • Butorphanol Itching     stadol   • Erythromycin Other (See Comments)     CAUSES HYPERTENSION         Current Outpatient Medications on File Prior to Visit   Medication Sig Dispense Refill   • Ascorbic Acid (VITAMIN C PO) Take  by mouth.     • baclofen (LIORESAL) 10 MG tablet TAKE ONE TABLET BY MOUTH THREE TIMES A DAY AS NEEDED FOR MUSCLE SPASMS 30 tablet 0   • docusate sodium 100 MG capsule Take 100 mg by mouth.     • Multiple Vitamins-Minerals (ZINC PO) Take  by mouth.     • omeprazole (priLOSEC) 40 MG capsule Take 1 capsule by mouth 2 (Two) Times a Day. 180 capsule 1   • Potassium 75 MG tablet Take  by mouth.     • QUEtiapine (SEROquel) 50 MG tablet Take 1 tablet by mouth every night at bedtime. 90 tablet 1   • VITAMIN D, CHOLECALCIFEROL, PO Take 1 tablet by mouth Daily.     • zolpidem (AMBIEN) 5 MG tablet Take 1 tablet by mouth At Night As Needed for Sleep. 30 tablet 1     Current Facility-Administered Medications on File Prior to Visit   Medication Dose Route Frequency Provider Last Rate Last Admin   • triamcinolone acetonide (KENALOG-40) injection 80 mg  80 mg Intramuscular Once Sadiq Parker MD           Objective   BP 91/65   Pulse 85   Ht 160 cm (63\")   Wt 66.7 kg (147 lb)   BMI 26.04 kg/m²     Podiatry Exam       General Appearance:  well nourished; well hydrated; no acute distress  Mental Status Exam        Judgement and Insight:  Intact       Orientation:  Oriented to time, place, and " person  Cardiovascular (Right)       Dorsalis Pedis Pulse (Rt):  2/4       Posterior Tibialis Pulse (Rt):  2/4       Capillary Filling Time (Rt):  1-3 Seconds       Edema (Rt):  No Edema  Dermatological Exam       Temperature:  warm to warm       Skin Elasticity:  normal skin elasticity  Incision site is well-healed.  Neurological Exam (Right)       Paresthesia (Rt):  negative       Achilles DTRs (Rt):  symmetric       Tinel over Tarsal Tunnel (Rt):  negative     MusculoSkeletal Exam (Right)       Gait and Stance (Rt):   mildly antalgic       ROM (Rt):   no pain or limitation with range of motion of the first metatarsophalangeal joint.  Other joints are unremarkable       Muscle Strength (Rt):  symmetrical 5/5; mild guarding       Subluxed Digits (Rt):  no subluxation or laxity of joints       Dislocated Joints (Rt):  no dislocation of joints       Inflammed Joints (Rt):  no inflammation of joints       Hammertoe Deformity (Rt):  none       Claw Toe Deformity (Rt):  none       Medial Turbicle Calc (Rt): Mild discomfort       Gastroc soleus equinus (Rt):  Inability to dorsiflex past neutral position both straight legged and bent knee       Post tibial tendon (Rt):  no soreness noted       Peroneal Tendon (Rt):  no soreness noted       Capsulitis of the MPJs (Rt):  no soreness noted    Assessment/Plan   Diagnoses and all orders for this visit:    1. Right foot pain (Primary)  -     XR Foot 3+ View Right    2. Closed nondisplaced fracture of third metatarsal bone of right foot with routine healing, subsequent encounter    3. Stress fracture of metatarsal bone of right foot with routine healing, subsequent encounter      Patient returns with mild discomfort involving the forefoot but is doing well overall.  Imaging was obtained and independently reviewed today showing increased callus formation involving the third metatarsal.  She has noticed decreasing symptoms.  I have recommended that she remain in the cam boot for  the next 2 weeks and then gradually return to her regular shoe and normal activity as tolerated.  I would like her to consider a pair of over-the-counter arch supports with metatarsal pad for forefoot offloading.  We did review appropriate stretching and manual therapy exercises.  I have also asked that she discuss osteoporosis evaluation with her primary care physician.  Patient understands and agrees.  I would like to see her in 4 weeks for reevaluation    Orders Placed This Encounter   Procedures   • XR Foot 3+ View Right     Order Specific Question:   Reason for Exam:     Answer:   Right foot pain FX RM 8 8:32 am     Order Specific Question:   Does this patient have a diabetic monitoring/medication delivering device on?     Answer:   No     Order Specific Question:   Release to patient     Answer:   Immediate          Note is dictated utilizing voice recognition software. Unfortunately this leads to occasional typographical errors. I apologize in advance if the situation occurs. If questions occur please do not hesitate to call our office.

## 2021-07-26 ENCOUNTER — OFFICE VISIT (OUTPATIENT)
Dept: FAMILY MEDICINE CLINIC | Facility: CLINIC | Age: 71
End: 2021-07-26

## 2021-07-26 VITALS
BODY MASS INDEX: 26.93 KG/M2 | SYSTOLIC BLOOD PRESSURE: 110 MMHG | WEIGHT: 152 LBS | DIASTOLIC BLOOD PRESSURE: 70 MMHG | HEART RATE: 77 BPM | OXYGEN SATURATION: 99 % | HEIGHT: 63 IN

## 2021-07-26 DIAGNOSIS — F32.5 MAJOR DEPRESSIVE DISORDER IN FULL REMISSION, UNSPECIFIED WHETHER RECURRENT (HCC): ICD-10-CM

## 2021-07-26 DIAGNOSIS — E55.9 VITAMIN D DEFICIENCY: Primary | ICD-10-CM

## 2021-07-26 DIAGNOSIS — E87.6 HYPOKALEMIA: ICD-10-CM

## 2021-07-26 DIAGNOSIS — S92.334D CLOSED NONDISPLACED FRACTURE OF THIRD METATARSAL BONE OF RIGHT FOOT WITH ROUTINE HEALING, SUBSEQUENT ENCOUNTER: ICD-10-CM

## 2021-07-26 DIAGNOSIS — M62.838 MUSCLE SPASM: ICD-10-CM

## 2021-07-26 DIAGNOSIS — F51.01 PRIMARY INSOMNIA: ICD-10-CM

## 2021-07-26 PROBLEM — S92.334A CLOSED NONDISPLACED FRACTURE OF THIRD METATARSAL BONE OF RIGHT FOOT: Status: ACTIVE | Noted: 2021-07-26

## 2021-07-26 PROCEDURE — 36415 COLL VENOUS BLD VENIPUNCTURE: CPT | Performed by: NURSE PRACTITIONER

## 2021-07-26 PROCEDURE — 80048 BASIC METABOLIC PNL TOTAL CA: CPT | Performed by: NURSE PRACTITIONER

## 2021-07-26 PROCEDURE — 99214 OFFICE O/P EST MOD 30 MIN: CPT | Performed by: NURSE PRACTITIONER

## 2021-07-26 RX ORDER — ZOLPIDEM TARTRATE 5 MG/1
5 TABLET ORAL NIGHTLY PRN
Qty: 30 TABLET | Refills: 1 | Status: SHIPPED | OUTPATIENT
Start: 2021-07-26 | End: 2021-10-21

## 2021-07-26 RX ORDER — POTASSIUM CHLORIDE 1500 MG/1
20 TABLET, FILM COATED, EXTENDED RELEASE ORAL 2 TIMES DAILY
Qty: 60 TABLET | Refills: 2 | Status: SHIPPED | OUTPATIENT
Start: 2021-07-26 | End: 2021-10-21

## 2021-07-26 RX ORDER — BACLOFEN 10 MG/1
10 TABLET ORAL 3 TIMES DAILY PRN
Qty: 30 TABLET | Refills: 2 | Status: SHIPPED | OUTPATIENT
Start: 2021-07-26 | End: 2021-10-21

## 2021-07-26 NOTE — PROGRESS NOTES
"Chief Complaint  Follow-up (4 week follow up muscle spasms)    Subjective          Krystyna Fabian presents to [facility]  History of Present Illness    Patient here for f/u visit.     Patient previously seen for muscle spasms to bilateral lower extremities.  She has been taking baclofen as needed.  Patient reports significant improvement in muscle spasms since improvement in right foot fracture.  She has also been doing exercises as instructed.  Gabapentin 100 mg nightly added to her regimen at last visit but patient reports spasms improved so she did not start medication.  Contributing factors also likely hypokalemia, previously 3.4.  Patient prescribed potassium chloride 20 mEq twice daily but she is only taking 20 mEq daily.  Patient is due for repeat labs today.  Patient is followed by Dr. Ro for a closed nondisplaced fracture of the third metatarsal bone of the right foot as well as stress fracture of metatarsal bone.  She is no longer wearing the cam boot.  Dr. Ro recommended work-up for osteoporosis.    Most recent GFR had decreased from 60 to 47 with a creatinine of 1.15.  Patient has increased water intake and decreased use of NSAIDs.  Labs will be repeated today.    Patient is taking Seroquel 50 mg nightly as well as Ambien 5 mg nightly for insomnia.  She reports she sleeps well without awakenings.  Patient requesting refill on Ambien.  Drug screen is up-to-date.    Patient weaned off of Wellbutrin in April and she is doing well with no complaints of depression or anxiety.    Objective   Vital Signs:   /70 (BP Location: Left arm, Patient Position: Sitting, Cuff Size: Adult)   Pulse 77   Ht 160 cm (63\")   Wt 68.9 kg (152 lb)   SpO2 99%   BMI 26.93 kg/m²       Physical Exam  Constitutional:       Appearance: Normal appearance.   HENT:      Head: Normocephalic.   Cardiovascular:      Rate and Rhythm: Normal rate and regular rhythm.   Pulmonary:      " Effort: Pulmonary effort is normal.      Breath sounds: Normal breath sounds.   Abdominal:      General: Abdomen is flat. Bowel sounds are normal.      Palpations: Abdomen is soft.   Musculoskeletal:         General: Normal range of motion.      Cervical back: Neck supple.      Right lower leg: No edema.      Left lower leg: No edema.   Skin:     General: Skin is warm and dry.   Neurological:      Mental Status: She is alert and oriented to person, place, and time.      Gait: Gait is intact.   Psychiatric:         Attention and Perception: Attention normal.         Mood and Affect: Mood normal.         Speech: Speech normal.        Result Review :     CMP    CMP 4/15/21 4/29/21 6/11/21   Glucose 80 80 68   BUN 9 10 13   Creatinine 0.91 0.93 1.15 (A)   eGFR Non  Am 61 60 (A) 47 (A)   Sodium 143 138 138   Potassium 2.5 (A) 3.4 (A) 3.4 (A)   Chloride 102 105 100   Calcium 9.4 8.8 9.3   Albumin   4.40   Total Bilirubin   0.6   Alkaline Phosphatase   97   AST (SGOT)   25   ALT (SGPT)   17   (A) Abnormal value            CBC    CBC 11/4/20 4/13/21 6/11/21   WBC 7.39 9.47 10.87 (A)   RBC 4.69 4.63 4.86   Hemoglobin 14.1 14.0 14.6   Hematocrit 41.5 41.2 43.4   MCV 88.5 89.0 89.3   MCH 30.1 30.2 30.0   MCHC 34.0 34.0 33.6   RDW 12.6 12.7 13.0   Platelets 313 315 304   (A) Abnormal value            Lipid Panel    Lipid Panel 4/13/21   Total Cholesterol 206 (A)   Triglycerides 96   HDL Cholesterol 58   VLDL Cholesterol 17   LDL Cholesterol  131 (A)   LDL/HDL Ratio 2.22   (A) Abnormal value            TSH    TSH 4/13/21   TSH 1.180           Data reviewed: Consultant notes Podiatry          Assessment and Plan    Diagnoses and all orders for this visit:    1. Vitamin D deficiency (Primary)  Assessment & Plan:  1.  DEXA scan ordered  2.  Continue vitamin D    Orders:  -     DEXA Bone Density Axial; Future    2. Primary insomnia  Assessment & Plan:  1.  Continue Ambien 5 mg nightly, refill sent to pharmacy  2.  Continue  Seroquel    Orders:  -     zolpidem (AMBIEN) 5 MG tablet; Take 1 tablet by mouth At Night As Needed for Sleep.  Dispense: 30 tablet; Refill: 1    3. Hypokalemia  Assessment & Plan:  1.  Repeat BMP today  2.  Continue potassium as prescribed    Orders:  -     Basic Metabolic Panel    4. Major depressive disorder in full remission, unspecified whether recurrent (CMS/HCC)  Assessment & Plan:  1.  Call with any new or worsening concerns      5. Muscle spasm  Assessment & Plan:  1.  Improving with treatment  2.  Continue to stretch  3.  Baclofen as needed      6. Closed nondisplaced fracture of third metatarsal bone of right foot with routine healing, subsequent encounter  Assessment & Plan:  1.  Follow-up with Dr. Ro      Other orders  -     baclofen (LIORESAL) 10 MG tablet; Take 1 tablet by mouth 3 (Three) Times a Day As Needed for Muscle Spasms.  Dispense: 30 tablet; Refill: 2  -     potassium chloride (K-TAB) 20 MEQ tablet controlled-release ER tablet; Take 1 tablet by mouth 2 (Two) Times a Day.  Dispense: 60 tablet; Refill: 2    I spent 25 minutes caring for Krystyna on this date of service. This time includes time spent by me in the following activities:preparing for the visit, reviewing tests, obtaining and/or reviewing a separately obtained history, performing a medically appropriate examination and/or evaluation , counseling and educating the patient/family/caregiver, ordering medications, tests, or procedures, documenting information in the medical record and care coordination  Follow Up   Return in about 3 months (around 10/26/2021).  Patient was given instructions and counseling regarding her condition or for health maintenance advice. Please see specific information pulled into the AVS if appropriate.

## 2021-07-27 LAB
ANION GAP SERPL CALCULATED.3IONS-SCNC: 14.3 MMOL/L (ref 5–15)
BUN SERPL-MCNC: 8 MG/DL (ref 8–23)
BUN/CREAT SERPL: 10 (ref 7–25)
CALCIUM SPEC-SCNC: 8.8 MG/DL (ref 8.6–10.5)
CHLORIDE SERPL-SCNC: 108 MMOL/L (ref 98–107)
CO2 SERPL-SCNC: 20.7 MMOL/L (ref 22–29)
CREAT SERPL-MCNC: 0.8 MG/DL (ref 0.57–1)
GFR SERPL CREATININE-BSD FRML MDRD: 71 ML/MIN/1.73
GLUCOSE SERPL-MCNC: 72 MG/DL (ref 65–99)
POTASSIUM SERPL-SCNC: 3.3 MMOL/L (ref 3.5–5.2)
SODIUM SERPL-SCNC: 143 MMOL/L (ref 136–145)

## 2021-07-27 NOTE — PROGRESS NOTES
Kidney function much improved with GFR back up to 71 which is normal. Patient's k+ is 3.3 so I do want her to take 20mEq twice daily. Script sent to pharmacy yesterday

## 2021-08-04 ENCOUNTER — OFFICE VISIT (OUTPATIENT)
Dept: PODIATRY | Facility: CLINIC | Age: 71
End: 2021-08-04

## 2021-08-04 VITALS
BODY MASS INDEX: 26.93 KG/M2 | HEIGHT: 63 IN | WEIGHT: 152 LBS | SYSTOLIC BLOOD PRESSURE: 112 MMHG | DIASTOLIC BLOOD PRESSURE: 75 MMHG | HEART RATE: 85 BPM

## 2021-08-04 DIAGNOSIS — M20.41 HAMMER TOE OF RIGHT FOOT: ICD-10-CM

## 2021-08-04 DIAGNOSIS — S92.334D CLOSED NONDISPLACED FRACTURE OF THIRD METATARSAL BONE OF RIGHT FOOT WITH ROUTINE HEALING, SUBSEQUENT ENCOUNTER: Primary | ICD-10-CM

## 2021-08-04 DIAGNOSIS — M85.872 OSTEOPENIA OF BOTH FEET: ICD-10-CM

## 2021-08-04 DIAGNOSIS — M84.374D STRESS FRACTURE OF METATARSAL BONE OF RIGHT FOOT WITH ROUTINE HEALING, SUBSEQUENT ENCOUNTER: ICD-10-CM

## 2021-08-04 DIAGNOSIS — M85.871 OSTEOPENIA OF BOTH FEET: ICD-10-CM

## 2021-08-04 PROCEDURE — 99213 OFFICE O/P EST LOW 20 MIN: CPT | Performed by: PODIATRIST

## 2021-08-04 NOTE — PROGRESS NOTES
"08/04/2021  Foot and Ankle Surgery - Established Patient/Follow-up  Provider: Dr. Reginald Ro DPM  Location: [facility]    Subjective:  Krystyna Fabian is a 71 y.o. female.     Chief Complaint   Patient presents with   • Right Foot - Follow-up     Last pcp appt 7/30/2021       HPI: Patient returns for follow-up regarding her right foot.  She has noticed improvement but continues to have discomfort with increased activity involving the forefoot region.  She has returned to her regular shoe with the use of the over-the-counter inserts.  She continues to complain of mild discomfort involving the toes.    Allergies   Allergen Reactions   • Butorphanol Itching     stadol   • Erythromycin Other (See Comments)     CAUSES HYPERTENSION         Current Outpatient Medications on File Prior to Visit   Medication Sig Dispense Refill   • Ascorbic Acid (VITAMIN C PO) Take  by mouth.     • baclofen (LIORESAL) 10 MG tablet Take 1 tablet by mouth 3 (Three) Times a Day As Needed for Muscle Spasms. 30 tablet 2   • docusate sodium 100 MG capsule Take 100 mg by mouth.     • Multiple Vitamins-Minerals (ZINC PO) Take  by mouth.     • omeprazole (priLOSEC) 40 MG capsule Take 1 capsule by mouth 2 (Two) Times a Day. 180 capsule 1   • potassium chloride (K-TAB) 20 MEQ tablet controlled-release ER tablet Take 1 tablet by mouth 2 (Two) Times a Day. 60 tablet 2   • QUEtiapine (SEROquel) 50 MG tablet Take 1 tablet by mouth every night at bedtime. 90 tablet 1   • VITAMIN D, CHOLECALCIFEROL, PO Take 1 tablet by mouth Daily.     • zolpidem (AMBIEN) 5 MG tablet Take 1 tablet by mouth At Night As Needed for Sleep. 30 tablet 1     Current Facility-Administered Medications on File Prior to Visit   Medication Dose Route Frequency Provider Last Rate Last Admin   • triamcinolone acetonide (KENALOG-40) injection 80 mg  80 mg Intramuscular Once Sadiq Parker MD           Objective   /75   Pulse 85   Ht 160 cm (63\")   Wt 68.9 " kg (152 lb)   BMI 26.93 kg/m²     Podiatry Exam       General Appearance:  well nourished; well hydrated; no acute distress  Mental Status Exam        Judgement and Insight:  Intact       Orientation:  Oriented to time, place, and person  Cardiovascular (Right)       Dorsalis Pedis Pulse (Rt):  2/4       Posterior Tibialis Pulse (Rt):  2/4       Capillary Filling Time (Rt):  1-3 Seconds       Edema (Rt):  No Edema  Dermatological Exam       Temperature:  warm to warm       Skin Elasticity:  normal skin elasticity  Incision site is well-healed.  Neurological Exam (Right)       Paresthesia (Rt):  negative       Achilles DTRs (Rt):  symmetric       Tinel over Tarsal Tunnel (Rt):  negative     MusculoSkeletal Exam (Right)       Gait and Stance (Rt):   mildly antalgic       ROM (Rt):   no pain or limitation with range of motion of the first metatarsophalangeal joint.  Other joints are unremarkable       Muscle Strength (Rt):  symmetrical 5/5; mild guarding       Subluxed Digits (Rt):  no subluxation or laxity of joints       Dislocated Joints (Rt):  no dislocation of joints       Inflammed Joints (Rt):  no inflammation of joints       Hammertoe Deformity (Rt):  none       Claw Toe Deformity (Rt):  none       Medial Turbicle Calc (Rt): Mild discomfort       Gastroc soleus equinus (Rt):  Inability to dorsiflex past neutral position both straight legged and bent knee       Post tibial tendon (Rt):  no soreness noted       Peroneal Tendon (Rt):  no soreness noted       Capsulitis of the MPJs (Rt):  no soreness noted    Assessment/Plan   Diagnoses and all orders for this visit:    1. Closed nondisplaced fracture of third metatarsal bone of right foot with routine healing, subsequent encounter (Primary)  -     Cancel: XR Foot 2 View Right  -     XR foot 3+ vw right    2. Stress fracture of metatarsal bone of right foot with routine healing, subsequent encounter    3. Hammer toe of right foot    4. Osteopenia of both  feet      Continued discomfort involving the right forefoot at the level of the third metatarsal.  Imaging was obtained and independently reviewed showing progressive healing involving the fracture at the mid diaphyseal region of the third metatarsal.  No additional fractures or dislocations are identified.  Clinically, she does have semirigid hammer digit deformities involving the lesser toes.  She does appear to be doing well but is having continued symptoms.  I have recommended that she return to the cam boot if necessary with the use of the over-the-counter arch supports.  I would like her to transition back to the regular shoe when possible.  I do feel that she would benefit from a crest pad as well for the digits.  She does have an upcoming DEXA scan and appointment with her PCP.  Patient is to call with any additional issues or concerns.  I will see her in 6 weeks for reevaluation.  Greater than 20 minutes was spent before, during, and after evaluation for patient care    Orders Placed This Encounter   Procedures   • XR foot 3+ vw right     Order Specific Question:   Reason for Exam:     Answer:   rt foot pain     Order Specific Question:   Does this patient have a diabetic monitoring/medication delivering device on?     Answer:   No     Order Specific Question:   Release to patient     Answer:   Immediate          Note is dictated utilizing voice recognition software. Unfortunately this leads to occasional typographical errors. I apologize in advance if the situation occurs. If questions occur please do not hesitate to call our office.

## 2021-08-09 ENCOUNTER — HOSPITAL ENCOUNTER (OUTPATIENT)
Dept: BONE DENSITY | Facility: HOSPITAL | Age: 71
Discharge: HOME | End: 2021-08-09
Admitting: NURSE PRACTITIONER

## 2021-08-09 DIAGNOSIS — E55.9 VITAMIN D DEFICIENCY: ICD-10-CM

## 2021-08-09 PROCEDURE — 77080 DXA BONE DENSITY AXIAL: CPT

## 2021-09-15 ENCOUNTER — OFFICE VISIT (OUTPATIENT)
Dept: PODIATRY | Facility: CLINIC | Age: 71
End: 2021-09-15

## 2021-09-15 VITALS
WEIGHT: 152 LBS | HEIGHT: 63 IN | SYSTOLIC BLOOD PRESSURE: 108 MMHG | BODY MASS INDEX: 26.93 KG/M2 | DIASTOLIC BLOOD PRESSURE: 73 MMHG | HEART RATE: 78 BPM

## 2021-09-15 DIAGNOSIS — M85.871 OSTEOPENIA OF BOTH FEET: ICD-10-CM

## 2021-09-15 DIAGNOSIS — M84.374D STRESS FRACTURE OF METATARSAL BONE OF RIGHT FOOT WITH ROUTINE HEALING, SUBSEQUENT ENCOUNTER: ICD-10-CM

## 2021-09-15 DIAGNOSIS — S92.334D CLOSED NONDISPLACED FRACTURE OF THIRD METATARSAL BONE OF RIGHT FOOT WITH ROUTINE HEALING, SUBSEQUENT ENCOUNTER: Primary | ICD-10-CM

## 2021-09-15 DIAGNOSIS — M20.41 HAMMER TOE OF RIGHT FOOT: ICD-10-CM

## 2021-09-15 DIAGNOSIS — M85.872 OSTEOPENIA OF BOTH FEET: ICD-10-CM

## 2021-09-15 PROCEDURE — 99212 OFFICE O/P EST SF 10 MIN: CPT | Performed by: PODIATRIST

## 2021-09-16 NOTE — PROGRESS NOTES
"09/15/2021  Foot and Ankle Surgery - Established Patient/Follow-up  Provider: Dr. Reginald Ro DPM  Location: [facility]    Subjective:  Krystyna Fabian is a 71 y.o. female.     Chief Complaint   Patient presents with   • Right Foot - Follow-up     Denies pain or swelling   • Follow-up     last pcp appt 6/1/2021       HPI: Patient appears to be doing quite well at this time.  She returns for follow-up involving her foot.  She did have her DEXA scan and has been taking vitamin D and calcium.  She denies any additional issues or concerns.    Allergies   Allergen Reactions   • Butorphanol Itching     stadol   • Erythromycin Other (See Comments)     CAUSES HYPERTENSION         Current Outpatient Medications on File Prior to Visit   Medication Sig Dispense Refill   • Ascorbic Acid (VITAMIN C PO) Take  by mouth.     • baclofen (LIORESAL) 10 MG tablet Take 1 tablet by mouth 3 (Three) Times a Day As Needed for Muscle Spasms. 30 tablet 2   • docusate sodium 100 MG capsule Take 100 mg by mouth.     • Multiple Vitamins-Minerals (ZINC PO) Take  by mouth.     • omeprazole (priLOSEC) 40 MG capsule Take 1 capsule by mouth 2 (Two) Times a Day. 180 capsule 1   • potassium chloride (K-TAB) 20 MEQ tablet controlled-release ER tablet Take 1 tablet by mouth 2 (Two) Times a Day. 60 tablet 2   • QUEtiapine (SEROquel) 50 MG tablet Take 1 tablet by mouth every night at bedtime. 90 tablet 1   • VITAMIN D, CHOLECALCIFEROL, PO Take 1 tablet by mouth Daily.     • zolpidem (AMBIEN) 5 MG tablet Take 1 tablet by mouth At Night As Needed for Sleep. 30 tablet 1     Current Facility-Administered Medications on File Prior to Visit   Medication Dose Route Frequency Provider Last Rate Last Admin   • triamcinolone acetonide (KENALOG-40) injection 80 mg  80 mg Intramuscular Once Sadiq Parker MD           Objective   /73   Pulse 78   Ht 160 cm (63\")   Wt 68.9 kg (152 lb)   BMI 26.93 kg/m²     Podiatry Exam       General " Appearance:  well nourished; well hydrated; no acute distress  Mental Status Exam        Judgement and Insight:  Intact       Orientation:  Oriented to time, place, and person  Cardiovascular (Right)       Dorsalis Pedis Pulse (Rt):  2/4       Posterior Tibialis Pulse (Rt):  2/4       Capillary Filling Time (Rt):  1-3 Seconds       Edema (Rt):  No Edema  Dermatological Exam       Temperature:  warm to warm       Skin Elasticity:  normal skin elasticity  Incision site is well-healed.  Neurological Exam (Right)       Paresthesia (Rt):  negative       Achilles DTRs (Rt):  symmetric       Tinel over Tarsal Tunnel (Rt):  negative     MusculoSkeletal Exam (Right)       Gait and Stance (Rt):   mildly antalgic       ROM (Rt):   no pain or limitation with range of motion of the first metatarsophalangeal joint.  Other joints are unremarkable       Muscle Strength (Rt):  symmetrical 5/5; mild guarding       Subluxed Digits (Rt):  no subluxation or laxity of joints       Dislocated Joints (Rt):  no dislocation of joints       Inflammed Joints (Rt):  no inflammation of joints       Hammertoe Deformity (Rt):  none       Claw Toe Deformity (Rt):  none       Medial Turbicle Calc (Rt): Mild discomfort       Gastroc soleus equinus (Rt):  Inability to dorsiflex past neutral position both straight legged and bent knee       Post tibial tendon (Rt):  no soreness noted       Peroneal Tendon (Rt):  no soreness noted       Capsulitis of the MPJs (Rt):  no soreness noted    Assessment/Plan   Diagnoses and all orders for this visit:    1. Closed nondisplaced fracture of third metatarsal bone of right foot with routine healing, subsequent encounter (Primary)    2. Stress fracture of metatarsal bone of right foot with routine healing, subsequent encounter    3. Hammer toe of right foot    4. Osteopenia of both feet      Patient feels that she is doing quite well at this time.  She has no pain or limitation.  She has been wearing the regular  shoes with her inserts on a daily basis.  She apparently does not have any issues with osteoporosis.  I have asked that she continue to wear supportive shoes and consider lower impact exercise.  Patient understands and agrees.  She will monitor and is to see me on an as-needed basis    No orders of the defined types were placed in this encounter.         Note is dictated utilizing voice recognition software. Unfortunately this leads to occasional typographical errors. I apologize in advance if the situation occurs. If questions occur please do not hesitate to call our office.

## 2021-10-20 DIAGNOSIS — F51.01 PRIMARY INSOMNIA: ICD-10-CM

## 2021-10-21 RX ORDER — POTASSIUM CHLORIDE 1500 MG/1
TABLET, FILM COATED, EXTENDED RELEASE ORAL
Qty: 60 TABLET | Refills: 2 | Status: SHIPPED | OUTPATIENT
Start: 2021-10-21 | End: 2023-03-01

## 2021-10-21 RX ORDER — ZOLPIDEM TARTRATE 5 MG/1
TABLET ORAL
Qty: 30 TABLET | Refills: 0 | Status: SHIPPED | OUTPATIENT
Start: 2021-10-21 | End: 2021-11-16 | Stop reason: SDUPTHER

## 2021-10-21 RX ORDER — BACLOFEN 10 MG/1
TABLET ORAL
Qty: 30 TABLET | Refills: 2 | Status: SHIPPED | OUTPATIENT
Start: 2021-10-21 | End: 2022-01-12

## 2021-10-25 DIAGNOSIS — F51.01 PRIMARY INSOMNIA: ICD-10-CM

## 2021-10-25 RX ORDER — QUETIAPINE FUMARATE 50 MG/1
50 TABLET, FILM COATED ORAL
Qty: 90 TABLET | Refills: 0 | Status: SHIPPED | OUTPATIENT
Start: 2021-10-25 | End: 2022-01-17

## 2021-10-25 RX ORDER — OMEPRAZOLE 40 MG/1
CAPSULE, DELAYED RELEASE ORAL
Qty: 180 CAPSULE | Refills: 0 | Status: SHIPPED | OUTPATIENT
Start: 2021-10-25 | End: 2022-01-17

## 2021-11-16 DIAGNOSIS — F51.01 PRIMARY INSOMNIA: ICD-10-CM

## 2021-11-16 RX ORDER — ZOLPIDEM TARTRATE 5 MG/1
5 TABLET ORAL NIGHTLY PRN
Qty: 30 TABLET | Refills: 0 | Status: SHIPPED | OUTPATIENT
Start: 2021-11-16 | End: 2021-12-16

## 2021-11-16 NOTE — TELEPHONE ENCOUNTER
Caller: Krystyna Fabian    Relationship: Self    Best call back number:[phone] (H)    Requested Prescriptions:   Requested Prescriptions     Pending Prescriptions Disp Refills   • zolpidem (AMBIEN) 5 MG tablet 30 tablet 0     Sig: Take 1 tablet by mouth At Night As Needed for Sleep.        [facility] [address] [place] [address] [place] [address] - [phone] [facility] [phone]      Additional details provided by patient: PATIENT WILL RUN OUT OF MEDICATION ON THE 11/20/2021, PATIENT WANTS TO KNOW HOW SHE CAN GET MULTIPLE REFILLS AT A TIME AND NOT HAVE TO CALL INTO THE OFFICE EVERY MONTH FOR A REFILL, PLEASE ADVISE PATIENT OF THE POLICY REGARDING THIS ASAP    Does the patient have less than a 3 day supply:  [x] Yes  [] No    Adrian Garvey Rep   11/16/21 11:38 EST

## 2021-12-08 ENCOUNTER — OFFICE VISIT (OUTPATIENT)
Dept: PODIATRY | Facility: CLINIC | Age: 71
End: 2021-12-08

## 2021-12-08 VITALS
BODY MASS INDEX: 26.93 KG/M2 | HEART RATE: 75 BPM | HEIGHT: 63 IN | DIASTOLIC BLOOD PRESSURE: 82 MMHG | WEIGHT: 152 LBS | SYSTOLIC BLOOD PRESSURE: 116 MMHG

## 2021-12-08 DIAGNOSIS — B35.1 ONYCHOMYCOSIS: Primary | ICD-10-CM

## 2021-12-08 PROCEDURE — 99213 OFFICE O/P EST LOW 20 MIN: CPT

## 2021-12-08 NOTE — PROGRESS NOTES
12/08/2021  Foot and Ankle Surgery - Established Patient/Follow-up  Provider: RADHA Souza   Location: [facility]    Subjective:  Krystyna Fabian is a 71 y.o. female.     Chief Complaint   Patient presents with   • Left Foot - Nail Problem   • Follow-up     last pcp appt 7/26/2021       HPI: Patient presents to office today with complaints of cracking to left great toe.  Patient states this is been present for several months and is bothersome because the toenail gets stuck on sock.  Patient denies any known trauma to the toe or nail.  Patient does report a history of plantar fasciitis that has been treated by Dr. Ro in the past she states that she is doing well with plantar fasciitis pain at this time and wears insoles daily.    Allergies   Allergen Reactions   • Butorphanol Itching     stadol   • Erythromycin Other (See Comments)     CAUSES HYPERTENSION         Current Outpatient Medications on File Prior to Visit   Medication Sig Dispense Refill   • Ascorbic Acid (VITAMIN C PO) Take  by mouth.     • baclofen (LIORESAL) 10 MG tablet TAKE ONE TABLET BY MOUTH THREE TIMES A DAY AS NEEDED FOR MUSCLE SPASMS 30 tablet 2   • docusate sodium 100 MG capsule Take 100 mg by mouth.     • Multiple Vitamins-Minerals (ZINC PO) Take  by mouth.     • omeprazole (priLOSEC) 40 MG capsule TAKE 1 CAPSULE TWICE DAILY 180 capsule 0   • potassium chloride ER (K-TAB) 20 MEQ tablet controlled-release ER tablet TAKE ONE TABLET BY MOUTH TWICE A DAY 60 tablet 2   • QUEtiapine (SEROquel) 50 MG tablet TAKE 1 TABLET BY MOUTH EVERY NIGHT AT BEDTIME. 90 tablet 0   • VITAMIN D, CHOLECALCIFEROL, PO Take 1 tablet by mouth Daily.     • zolpidem (AMBIEN) 5 MG tablet Take 1 tablet by mouth At Night As Needed for Sleep. 30 tablet 0     Current Facility-Administered Medications on File Prior to Visit   Medication Dose Route Frequency Provider Last Rate Last Admin   • triamcinolone acetonide (KENALOG-40) injection 80 mg  80 mg  "Intramuscular Once Sadiq Parker MD           Objective   /82   Pulse 75   Ht 160 cm (63\")   Wt 68.9 kg (152 lb)   BMI 26.93 kg/m²     Foot/Ankle Exam:       General:   Appearance: appears stated age and healthy    Orientation: AAOx3    Affect: appropriate    Gait: unimpaired    Shoe Gear:  Casual shoes and socks    VASCULAR      Left Foot Vascularity   Normal vascular exam    Dorsalis pedis:  2+  Posterior tibial:  2+  Skin Temperature: warm    Edema Grading:  None  CFT:  < 3 seconds  Pedal Hair Growth:  Present  Varicosities: none        NEUROLOGIC     Left Foot Neurologic   Normal sensation    Light touch sensation:  Normal  Vibratory sensation:  Normal  Hot/cold sensation: normal       MUSCULOSKELETAL      Left Foot Musculoskeletal   Ecchymosis:  None  Tenderness: none    Arch:  Normal     MUSCLE STRENGTH     Left Foot Muscle Strength   Normal strength    Foot dorsiflexion:  5  Foot plantar flexion:  5  Foot inversion:  5  Foot eversion:  5     DERMATOLOGIC     Right Foot Dermatologic   Nails: absent    Nails comment:  Absent great toenail (removed 30 years ago)     Left Foot Dermatologic   Skin: skin intact    Nails: onychomycosis and abnormally thick    Nails comment:  Isolated to the left great toe      Left Foot Additional Comments: Fungal appearing, thickened, discolored left great toenail.      Assessment/Plan   Diagnoses and all orders for this visit:    1. Onychomycosis (Primary)      On exam patient does have mild discoloration, thickened, uneven/jagged, fungal appearing left great toenail.  We discussed the pathophysiology of toenail fungus and options for treatment at length.  We discussed conservative treatment including keeping toenail trimmed back and softened with topical over-the-counter Vicks vapor rub.  Other treatments including toenail removal and toenail removal with matrixectomy were also discussed and offered.  At this time patient would like to proceed with keeping toenail " cut back and applying Vicks vapor rub to keep nail softened.  Like for patient to follow-up in 2 weeks for reevaluation.  Patient verbalized understanding and is agreeable to plan at this time.  No orders of the defined types were placed in this encounter.         Note is dictated utilizing voice recognition software. Unfortunately this leads to occasional typographical errors. I apologize in advance if the situation occurs. If questions occur please do not hesitate to call our office.

## 2021-12-15 DIAGNOSIS — F51.01 PRIMARY INSOMNIA: ICD-10-CM

## 2021-12-16 RX ORDER — ZOLPIDEM TARTRATE 5 MG/1
TABLET ORAL
Qty: 30 TABLET | Refills: 0 | Status: SHIPPED | OUTPATIENT
Start: 2021-12-16 | End: 2022-01-12

## 2021-12-22 ENCOUNTER — OFFICE VISIT (OUTPATIENT)
Dept: PODIATRY | Facility: CLINIC | Age: 71
End: 2021-12-22

## 2021-12-22 VITALS
HEART RATE: 82 BPM | BODY MASS INDEX: 26.93 KG/M2 | DIASTOLIC BLOOD PRESSURE: 68 MMHG | WEIGHT: 152 LBS | SYSTOLIC BLOOD PRESSURE: 100 MMHG | HEIGHT: 63 IN

## 2021-12-22 DIAGNOSIS — B35.1 ONYCHOMYCOSIS: Primary | ICD-10-CM

## 2021-12-22 PROCEDURE — 99212 OFFICE O/P EST SF 10 MIN: CPT

## 2021-12-22 PROCEDURE — 11730 AVULSION NAIL PLATE SIMPLE 1: CPT

## 2021-12-22 RX ORDER — TERBINAFINE HYDROCHLORIDE 250 MG/1
250 TABLET ORAL DAILY
Qty: 30 TABLET | Refills: 2 | Status: SHIPPED | OUTPATIENT
Start: 2021-12-22 | End: 2022-07-07

## 2021-12-22 NOTE — PATIENT INSTRUCTIONS
Terbinafine tablets  What is this medicine?  TERBINAFINE (TER bin a feen) is an antifungal medicine. It is used to treat certain kinds of fungal or yeast infections.  This medicine may be used for other purposes; ask your health care provider or pharmacist if you have questions.  COMMON BRAND NAME(S): Lamisil, Terbinex  What should I tell my health care provider before I take this medicine?  They need to know if you have any of these conditions:  · drink alcoholic beverages  · kidney disease  · liver disease  · an unusual or allergic reaction to terbinafine, other medicines, foods, dyes, or preservatives  · pregnant or trying to get pregnant  · breast-feeding  How should I use this medicine?  Take this medicine by mouth with a full glass of water. Follow the directions on the prescription label. You can take this medicine with food or on an empty stomach. Take your medicine at regular intervals. Do not take your medicine more often than directed. Do not skip doses or stop your medicine early even if you feel better. Do not stop taking except on your doctor's advice.  A special MedGuide will be given to you by the pharmacist with each prescription and refill. Be sure to read this information carefully each time.  Talk to your pediatrician regarding the use of this medicine in children. Special care may be needed.  Overdosage: If you think you have taken too much of this medicine contact a poison control center or emergency room at once.  NOTE: This medicine is only for you. Do not share this medicine with others.  What if I miss a dose?  If you miss a dose, take it as soon as you can. If it is almost time for your next dose, take only that dose. Do not take double or extra doses.  What may interact with this medicine?  Do not take this medicine with any of the following medications:  · thioridazine  This medicine may also interact with the following  medications:  · beta-blockers  · caffeine  · cimetidine  · cyclosporine  · medicines for depression, anxiety, or psychotic disturbances  · medicines for fungal infections like fluconazole and ketoconazole  · medicines for irregular heartbeat like amiodarone, flecainide and propafenone  · rifampin  · warfarin  This list may not describe all possible interactions. Give your health care provider a list of all the medicines, herbs, non-prescription drugs, or dietary supplements you use. Also tell them if you smoke, drink alcohol, or use illegal drugs. Some items may interact with your medicine.  What should I watch for while using this medicine?  Visit your doctor or health care provider regularly. Tell your doctor right away if you have nausea or vomiting, loss of appetite, stomach pain on your right upper side, yellow skin, dark urine, light stools, or are over tired. Some fungal infections need many weeks or months of treatment to cure. If you are taking this medicine for a long time, you will need to have important blood work done.  This medicine may cause serious skin reactions. They can happen weeks to months after starting the medicine. Contact your health care provider right away if you notice fevers or flu-like symptoms with a rash. The rash may be red or purple and then turn into blisters or peeling of the skin. Or, you might notice a red rash with swelling of the face, lips or lymph nodes in your neck or under your arms.  What side effects may I notice from receiving this medicine?  Side effects that you should report to your doctor or health care professional as soon as possible:  · allergic reactions like skin rash or hives, swelling of the face, lips, or tongue  · changes in vision  · dark urine  · fever or infection  · general ill feeling or flu-like symptoms  · light-colored stools  · loss of appetite, nausea  · rash, fever, and swollen lymph nodes  · redness, blistering, peeling or loosening of the  skin, including inside the mouth  · right upper belly pain  · unusually weak or tired  · yellowing of the eyes or skin  Side effects that usually do not require medical attention (report to your doctor or health care professional if they continue or are bothersome):  · changes in taste  · diarrhea  · hair loss  · muscle or joint pain  · stomach gas  · stomach upset  This list may not describe all possible side effects. Call your doctor for medical advice about side effects. You may report side effects to FDA at 6-459-WOJ-4156.  Where should I keep my medicine?  Keep out of the reach of children.  Store at room temperature below 25 degrees C (77 degrees F). Protect from light. Throw away any unused medicine after the expiration date.  NOTE: This sheet is a summary. It may not cover all possible information. If you have questions about this medicine, talk to your doctor, pharmacist, or health care provider.  © 2021 [employer] (2020-03-27 15:37:07)

## 2021-12-22 NOTE — PROGRESS NOTES
"12/22/2021  Foot and Ankle Surgery - Established Patient/Follow-up  Provider: RADHA Souza   Location: [facility]    Subjective:  Krystyna Fabian is a 71 y.o. female.     Chief Complaint   Patient presents with   • Left Foot - Pain, Nail Problem   • Follow-up     last pcp appt 7/26/2021 with MEGAN GARCIA        HPI: Patient returns for reevaluation of left great toe nail fungus.  She states that she has been applying Vicks vapor rub to the left great toenail and she is still having bothersome symptoms related to the nail.  Patient reports nail is still cracking and gets stuck on sock.    Allergies   Allergen Reactions   • Butorphanol Itching     stadol   • Erythromycin Other (See Comments)     CAUSES HYPERTENSION         Current Outpatient Medications on File Prior to Visit   Medication Sig Dispense Refill   • Ascorbic Acid (VITAMIN C PO) Take  by mouth.     • baclofen (LIORESAL) 10 MG tablet TAKE ONE TABLET BY MOUTH THREE TIMES A DAY AS NEEDED FOR MUSCLE SPASMS 30 tablet 2   • docusate sodium 100 MG capsule Take 100 mg by mouth.     • Multiple Vitamins-Minerals (ZINC PO) Take  by mouth.     • omeprazole (priLOSEC) 40 MG capsule TAKE 1 CAPSULE TWICE DAILY 180 capsule 0   • potassium chloride ER (K-TAB) 20 MEQ tablet controlled-release ER tablet TAKE ONE TABLET BY MOUTH TWICE A DAY 60 tablet 2   • QUEtiapine (SEROquel) 50 MG tablet TAKE 1 TABLET BY MOUTH EVERY NIGHT AT BEDTIME. 90 tablet 0   • VITAMIN D, CHOLECALCIFEROL, PO Take 1 tablet by mouth Daily.     • zolpidem (AMBIEN) 5 MG tablet TAKE ONE TABLET BY MOUTH EVERY NIGHT AT BEDTIME AS NEEDED FOR SLEEP 30 tablet 0     Current Facility-Administered Medications on File Prior to Visit   Medication Dose Route Frequency Provider Last Rate Last Admin   • triamcinolone acetonide (KENALOG-40) injection 80 mg  80 mg Intramuscular Once Sadiq Parker MD           Objective   /68   Pulse 82   Ht 160 cm (63\")   Wt 68.9 kg (152 lb)   " BMI 26.93 kg/m²     Foot/Ankle Exam:       General:   Appearance: appears stated age and healthy    Orientation: AAOx3    Affect: appropriate    Gait: unimpaired    Assistance: independent    Shoe Gear:  Casual shoes and socks    VASCULAR      Left Foot Vascularity   Normal vascular exam    Dorsalis pedis:  2+  Posterior tibial:  2+  Skin Temperature: warm    Edema Grading:  None  CFT:  < 3 seconds  Pedal Hair Growth:  Present  Varicosities: none        NEUROLOGIC     Left Foot Neurologic   Normal sensation    Light touch sensation:  Normal  Vibratory sensation:  Normal  Hot/cold sensation: normal       MUSCULOSKELETAL      Left Foot Musculoskeletal   Ecchymosis:  None  Tenderness: none       DERMATOLOGIC     Left Foot Dermatologic   Skin: skin intact    Nails: onychomycosis        Assessment/Plan   Diagnoses and all orders for this visit:    1. Onychomycosis (Primary)  -     terbinafine (LamISIL) 250 MG tablet; Take 1 tablet by mouth Daily.  Dispense: 30 tablet; Refill: 2  -     Toe Nail Avulsion  -     Comprehensive Metabolic Panel; Future    Other orders  -     Cancel: Comprehensive Metabolic Panel; Future      On exam patient does continue to have discolored, thickened, cracking toenail to left great toe.  Patient is not satisfied with treating conservatively with topical over-the-counter Vicks vapor rub to soften nailbed.  We did review and discuss options such as applying topical prescription antifungal agent.  At this time patient has found nail to be problematic enough that does want nail removed today.  Since nail has mild fungal symptoms and changes are fairly recent do feel like it is reasonable to consider oral antifungal and attempt to eradicate fungal infection to nail. recent CMP was reviewed from 6 months ago and ALT/AST were within normal limits.  Patient would like to proceed with nail plate removal and oral antifungal treatment.  Will recommend patient repeat labs in 6 weeks to evaluate liver  function and follow-up for reevaluation.  Patient understands that medication can affect liver function and is therefore important to follow-up with ordered lab work before continuing with medication.  Patient verbalizes understanding and is agreeable to plan at this time..    Total Nail Avulsion: Left great toe    Consent and time out was performed before proceeding with the procedure.  Left great toe was cleansed with alcohol and the digit was blocked in a ring block fashion utilizing 5 mL of 1% lidocaine plain.  A digital tourniquet was applied to the digit.  The nail was lifted from the nail bed and nail margins with a [place]. The offending nail margins were grasped with a hemostat and removed.  The nail borders were explored with a curette to ensure adequate removal.  The nail fold and exposed nail bed were flushed with normal saline.  Antibiotic ointment followed by sterile compressive dressings were then applied.  The digital tourniquot was removed.  No excessive bleeding or complications were evident.  The patient tolerated procedure and local anesthetic well.    Orders Placed This Encounter   Procedures   • Comprehensive Metabolic Panel     Standing Status:   Future     Standing Expiration Date:   12/22/2022     Order Specific Question:   Release to patient     Answer:   Immediate   • Toe Nail Avulsion          Note is dictated utilizing voice recognition software. Unfortunately this leads to occasional typographical errors. I apologize in advance if the situation occurs. If questions occur please do not hesitate to call our office.

## 2022-01-12 DIAGNOSIS — F51.01 PRIMARY INSOMNIA: ICD-10-CM

## 2022-01-12 RX ORDER — BACLOFEN 10 MG/1
TABLET ORAL
Qty: 30 TABLET | Refills: 2 | Status: SHIPPED | OUTPATIENT
Start: 2022-01-12 | End: 2022-02-10 | Stop reason: SDUPTHER

## 2022-01-12 RX ORDER — ZOLPIDEM TARTRATE 5 MG/1
TABLET ORAL
Qty: 30 TABLET | Refills: 0 | Status: SHIPPED | OUTPATIENT
Start: 2022-01-12 | End: 2022-02-10 | Stop reason: SDUPTHER

## 2022-01-17 DIAGNOSIS — F51.01 PRIMARY INSOMNIA: ICD-10-CM

## 2022-01-17 RX ORDER — OMEPRAZOLE 40 MG/1
CAPSULE, DELAYED RELEASE ORAL
Qty: 180 CAPSULE | Refills: 0 | Status: SHIPPED | OUTPATIENT
Start: 2022-01-17 | End: 2022-03-08

## 2022-01-17 RX ORDER — QUETIAPINE FUMARATE 50 MG/1
TABLET, FILM COATED ORAL
Qty: 90 TABLET | Refills: 0 | Status: SHIPPED | OUTPATIENT
Start: 2022-01-17 | End: 2022-04-19

## 2022-01-29 PROCEDURE — 87186 SC STD MICRODIL/AGAR DIL: CPT | Performed by: NURSE PRACTITIONER

## 2022-01-29 PROCEDURE — 87077 CULTURE AEROBIC IDENTIFY: CPT | Performed by: NURSE PRACTITIONER

## 2022-01-29 PROCEDURE — 87086 URINE CULTURE/COLONY COUNT: CPT | Performed by: NURSE PRACTITIONER

## 2022-02-03 ENCOUNTER — TELEPHONE (OUTPATIENT)
Dept: FAMILY MEDICINE CLINIC | Facility: CLINIC | Age: 72
End: 2022-02-03

## 2022-02-03 RX ORDER — NITROFURANTOIN 25; 75 MG/1; MG/1
100 CAPSULE ORAL 2 TIMES DAILY
Qty: 14 CAPSULE | Refills: 0 | Status: SHIPPED | OUTPATIENT
Start: 2022-02-03 | End: 2022-02-18

## 2022-02-03 NOTE — TELEPHONE ENCOUNTER
Caller: Krystyna Fabian    Relationship: Self    Best call back number: [phone]    What medication are you requesting: SOMETHING FOR UTI     What are your current symptoms: BURNING WITH URINATION     Have you had these symptoms before:    [x] Yes  [] No    Have you been treated for these symptoms before:   [x] Yes  [] No    If a prescription is needed, what is your preferred pharmacy and phone number: [id number] - [facility] [address] [place] [address] AT [facility] [phone] [facility] [phone] FX     Additional notes: PATIENT ON CEFDINIR 300 MG FOR UTI SINCE Saturday AND STATES MEDICATION IS NOT HELPING TO CLEAR UP UTI PLEASE CALL AND ADVISE

## 2022-02-06 ENCOUNTER — HOSPITAL ENCOUNTER (EMERGENCY)
Facility: HOSPITAL | Age: 72
Discharge: HOME | End: 2022-02-06
Attending: EMERGENCY MEDICINE | Admitting: EMERGENCY MEDICINE

## 2022-02-06 ENCOUNTER — APPOINTMENT (OUTPATIENT)
Dept: GENERAL RADIOLOGY | Facility: HOSPITAL | Age: 72
End: 2022-02-06

## 2022-02-06 VITALS
DIASTOLIC BLOOD PRESSURE: 52 MMHG | WEIGHT: 151 LBS | RESPIRATION RATE: 18 BRPM | SYSTOLIC BLOOD PRESSURE: 94 MMHG | HEART RATE: 79 BPM | BODY MASS INDEX: 26.75 KG/M2 | OXYGEN SATURATION: 92 % | TEMPERATURE: 98.3 F | HEIGHT: 63 IN

## 2022-02-06 DIAGNOSIS — J12.82 PNEUMONIA DUE TO COVID-19 VIRUS: Primary | ICD-10-CM

## 2022-02-06 DIAGNOSIS — R53.1 WEAKNESS: ICD-10-CM

## 2022-02-06 DIAGNOSIS — U07.1 PNEUMONIA DUE TO COVID-19 VIRUS: Primary | ICD-10-CM

## 2022-02-06 LAB
ALBUMIN SERPL-MCNC: 3.3 G/DL (ref 3.5–5.2)
ALBUMIN/GLOB SERPL: 1 G/DL
ALP SERPL-CCNC: 155 U/L (ref 39–117)
ALT SERPL W P-5'-P-CCNC: 73 U/L (ref 1–33)
ANION GAP SERPL CALCULATED.3IONS-SCNC: 16 MMOL/L (ref 5–15)
AST SERPL-CCNC: 82 U/L (ref 1–32)
BACTERIA UR QL AUTO: ABNORMAL /HPF
BASOPHILS # BLD AUTO: 0 10*3/MM3 (ref 0–0.2)
BASOPHILS NFR BLD AUTO: 0.4 % (ref 0–1.5)
BILIRUB SERPL-MCNC: 0.3 MG/DL (ref 0–1.2)
BILIRUB UR QL STRIP: NEGATIVE
BUN SERPL-MCNC: 18 MG/DL (ref 8–23)
BUN/CREAT SERPL: 21.4 (ref 7–25)
CALCIUM SPEC-SCNC: 8.4 MG/DL (ref 8.6–10.5)
CHLORIDE SERPL-SCNC: 104 MMOL/L (ref 98–107)
CLARITY UR: CLEAR
CO2 SERPL-SCNC: 17 MMOL/L (ref 22–29)
COLOR UR: YELLOW
CREAT SERPL-MCNC: 0.84 MG/DL (ref 0.57–1)
DEPRECATED RDW RBC AUTO: 46.4 FL (ref 37–54)
EOSINOPHIL # BLD AUTO: 0 10*3/MM3 (ref 0–0.4)
EOSINOPHIL NFR BLD AUTO: 0.1 % (ref 0.3–6.2)
ERYTHROCYTE [DISTWIDTH] IN BLOOD BY AUTOMATED COUNT: 15 % (ref 12.3–15.4)
FERRITIN SERPL-MCNC: 380.5 NG/ML (ref 13–150)
GFR SERPL CREATININE-BSD FRML MDRD: 67 ML/MIN/1.73
GLOBULIN UR ELPH-MCNC: 3.2 GM/DL
GLUCOSE SERPL-MCNC: 109 MG/DL (ref 65–99)
GLUCOSE UR STRIP-MCNC: NEGATIVE MG/DL
HCT VFR BLD AUTO: 43.3 % (ref 34–46.6)
HGB BLD-MCNC: 14.3 G/DL (ref 12–15.9)
HGB UR QL STRIP.AUTO: NEGATIVE
HYALINE CASTS UR QL AUTO: ABNORMAL /LPF
KETONES UR QL STRIP: ABNORMAL
LDH SERPL-CCNC: 616 U/L (ref 135–214)
LEUKOCYTE ESTERASE UR QL STRIP.AUTO: ABNORMAL
LYMPHOCYTES # BLD AUTO: 1.3 10*3/MM3 (ref 0.7–3.1)
LYMPHOCYTES NFR BLD AUTO: 13 % (ref 19.6–45.3)
MCH RBC QN AUTO: 28.8 PG (ref 26.6–33)
MCHC RBC AUTO-ENTMCNC: 32.9 G/DL (ref 31.5–35.7)
MCV RBC AUTO: 87.5 FL (ref 79–97)
MONOCYTES # BLD AUTO: 0.7 10*3/MM3 (ref 0.1–0.9)
MONOCYTES NFR BLD AUTO: 6.5 % (ref 5–12)
NEUTROPHILS NFR BLD AUTO: 8.3 10*3/MM3 (ref 1.7–7)
NEUTROPHILS NFR BLD AUTO: 80 % (ref 42.7–76)
NITRITE UR QL STRIP: NEGATIVE
NRBC BLD AUTO-RTO: 0.1 /100 WBC (ref 0–0.2)
PH UR STRIP.AUTO: 5.5 [PH] (ref 5–8)
PLATELET # BLD AUTO: 290 10*3/MM3 (ref 140–450)
PMV BLD AUTO: 9 FL (ref 6–12)
POTASSIUM SERPL-SCNC: 3.4 MMOL/L (ref 3.5–5.2)
PROCALCITONIN SERPL-MCNC: 0.15 NG/ML (ref 0–0.25)
PROT SERPL-MCNC: 6.5 G/DL (ref 6–8.5)
PROT UR QL STRIP: ABNORMAL
RBC # BLD AUTO: 4.95 10*6/MM3 (ref 3.77–5.28)
RBC # UR STRIP: ABNORMAL /HPF
REF LAB TEST METHOD: ABNORMAL
S PYO AG THROAT QL: NEGATIVE
SARS-COV-2 RNA PNL SPEC NAA+PROBE: DETECTED
SODIUM SERPL-SCNC: 137 MMOL/L (ref 136–145)
SP GR UR STRIP: 1.02 (ref 1–1.03)
SQUAMOUS #/AREA URNS HPF: ABNORMAL /HPF
UROBILINOGEN UR QL STRIP: ABNORMAL
WBC # UR STRIP: ABNORMAL /HPF
WBC NRBC COR # BLD: 10.4 10*3/MM3 (ref 3.4–10.8)

## 2022-02-06 PROCEDURE — 99283 EMERGENCY DEPT VISIT LOW MDM: CPT

## 2022-02-06 PROCEDURE — 85025 COMPLETE CBC W/AUTO DIFF WBC: CPT | Performed by: NURSE PRACTITIONER

## 2022-02-06 PROCEDURE — P9612 CATHETERIZE FOR URINE SPEC: HCPCS

## 2022-02-06 PROCEDURE — 87635 SARS-COV-2 COVID-19 AMP PRB: CPT | Performed by: EMERGENCY MEDICINE

## 2022-02-06 PROCEDURE — 71045 X-RAY EXAM CHEST 1 VIEW: CPT

## 2022-02-06 PROCEDURE — 83615 LACTATE (LD) (LDH) ENZYME: CPT | Performed by: NURSE PRACTITIONER

## 2022-02-06 PROCEDURE — 36415 COLL VENOUS BLD VENIPUNCTURE: CPT

## 2022-02-06 PROCEDURE — 87651 STREP A DNA AMP PROBE: CPT

## 2022-02-06 PROCEDURE — 82728 ASSAY OF FERRITIN: CPT | Performed by: NURSE PRACTITIONER

## 2022-02-06 PROCEDURE — 81001 URINALYSIS AUTO W/SCOPE: CPT | Performed by: NURSE PRACTITIONER

## 2022-02-06 PROCEDURE — 87086 URINE CULTURE/COLONY COUNT: CPT | Performed by: NURSE PRACTITIONER

## 2022-02-06 PROCEDURE — 80053 COMPREHEN METABOLIC PANEL: CPT | Performed by: NURSE PRACTITIONER

## 2022-02-06 PROCEDURE — 84145 PROCALCITONIN (PCT): CPT | Performed by: NURSE PRACTITIONER

## 2022-02-06 RX ADMIN — SODIUM CHLORIDE, POTASSIUM CHLORIDE, SODIUM LACTATE AND CALCIUM CHLORIDE 1000 ML: 600; 310; 30; 20 INJECTION, SOLUTION INTRAVENOUS at 19:38

## 2022-02-06 NOTE — ED PROVIDER NOTES
Subjective      Chief complaint:  weakness      Context: Patient is a 72-year-old female who comes in with her daughter with multiple complaints.  She states over the last 2 to 3 weeks she has had wide-ranging symptoms.  She states she has had ear pain sinus coughing congestion.  Has had a productive cough with yellow sputum.  She reports generalized weakness.  Has had a  sore throat that started last couple days.  Denies any fever.  Has not had any vomiting or diarrhea.  She states she did have dysuria and was started on Omnicef on January 29 but does not feel like her symptoms were clearing so she switched to Macrobid on February 3 as well as a Medrol Dosepak.  States she has been having some pleuritic chest pain with coughing.  Reports generalized fatigue and weakness.  No rash.  Has not been COVID vaccinated, family was positive previously.  States she finished her steroids today.  Daughter reports her home pulse ox has not been below 92%    Duration: 2-3 weeks.     Timing: as above    Severity: myalgias    Associated symptoms: improvement with macrobid          PCP: Eduardo          Review of Systems   Constitutional: Negative for fever.   HENT: Positive for congestion and sore throat.    Eyes: Negative.    Respiratory: Positive for cough.    Cardiovascular: Negative for palpitations and leg swelling.   Gastrointestinal: Negative for abdominal pain, diarrhea, nausea and vomiting.        Decreased appetite    Genitourinary: Positive for dysuria.   Musculoskeletal: Positive for back pain and myalgias.   Skin: Negative.    Allergic/Immunologic: Negative for immunocompromised state.   Neurological: Positive for weakness. Negative for syncope.   Hematological: Does not bruise/bleed easily.   Psychiatric/Behavioral: Negative for confusion.       Past Medical History:   Diagnosis Date   • Acute bronchitis    • Allergic rhinitis    • Anxiety    • Arthritis     knee and neck   • Autoimmune disease (HCC)     no  difinitive dx   • B12 deficiency    • Bursitis of both hips    • C. difficile colitis 2013   • Carpal tunnel syndrome     right   • Clostridium difficile colitis 2013   • DDD (degenerative disc disease), lumbar    • Fibromyalgia    • GERD (gastroesophageal reflux disease)    • Hx of migraine headaches     once- with erythromycin   • IBS (irritable bowel syndrome)    • Insomnia    • Leg cramps    • Paresthesia     left leg between knee and ankle   • Peripheral edema    • Pulmonary hypertension (HCC)     2017 pressure 30-40-sleep study negative       Allergies   Allergen Reactions   • Butorphanol Itching     stadol   • Erythromycin Other (See Comments)     CAUSES HYPERTENSION         Past Surgical History:   Procedure Laterality Date   • ABDOMINOPLASTY     • CHOLECYSTECTOMY     • COLONOSCOPY      2013   • EPIDURAL     • LUMBAR DISC SURGERY      anterior   • LUMBAR SPINE SURGERY      fx spine with cement and cage   • LUMBAR SPINE SURGERY      second surgery due to fall   • PLANTAR FASCIA RELEASE Right 11/9/2020    Procedure: ENDOSCOPIC PLANTAR FASCIOTOMY;  Surgeon: GERRI Ro DPM;  Location: [facility] MAIN OR;  Service: Podiatry;  Laterality: Right;   • THIGH LIFT     • TUBAL ABDOMINAL LIGATION         Family History   Problem Relation Age of Onset   • Osteoarthritis Mother    • Breast cancer Mother    • Diabetes Sister    • Arthritis Other    • Cancer Other    • Obesity Other    • Kidney disease Other    • Heart disease Other    • Stomach cancer Father        Social History     Socioeconomic History   • Marital status: Single   Tobacco Use   • Smoking status: Never Smoker   • Smokeless tobacco: Never Used   Vaping Use   • Vaping Use: Never used   Substance and Sexual Activity   • Alcohol use: No   • Drug use: No   • Sexual activity: Defer           Objective   Physical Exam     Vital signs and triage nurse note reviewed.   Constitutional: Awake, alert; daughter at bedside  HEENT: Normocephalic, atraumatic; pupils  are PERRL with intact EOM; oropharynx is pink and dry without exudate or erythema.   Neck: Supple, full range of motion without pain;    Cardiovascular: Regular rate and rhythm, normal S1-S2.   Pulmonary: Respiratory effort regular nonlabored, breath sounds diminished   Abdomen: Soft, nontender nondistended with normoactive bowel sounds; no rebound or guarding.   Musculoskeletal: Independent range of motion of all extremities; generalized myalgias   Neuro:  oriented x3, speech is clear and appropriate, GCS 15   Skin:  Fleshtone warm, dry, intact; no erythematous or petechial rash or lesion       Procedures           ED Course           Labs Reviewed   COVID-19,CEPHEID/SANIYA,COR/TANIYA/PAD/ELEAZAR IN-HOUSE,NP SWAB IN TRANSPORT MEDIA 3-4 HR TAT, RT-PCR - Abnormal; Notable for the following components:       Result Value    COVID19 Detected (*)     All other components within normal limits    Narrative:     Fact sheet for providers: https://www.fda.gov/media/887286/download     Fact sheet for patients: https://www.fda.gov/media/434575/download  Fact sheet for providers: https://www.fda.gov/media/793266/download    Fact sheet for patients: https://www.fda.gov/media/252643/download    Test performed by PCR.   COMPREHENSIVE METABOLIC PANEL - Abnormal; Notable for the following components:    Glucose 109 (*)     Potassium 3.4 (*)     CO2 17.0 (*)     Calcium 8.4 (*)     Albumin 3.30 (*)     ALT (SGPT) 73 (*)     AST (SGOT) 82 (*)     Alkaline Phosphatase 155 (*)     Anion Gap 16.0 (*)     All other components within normal limits    Narrative:     GFR Normal >60  Chronic Kidney Disease <60  Kidney Failure <15     LACTATE DEHYDROGENASE - Abnormal; Notable for the following components:     (*)     All other components within normal limits   FERRITIN - Abnormal; Notable for the following components:    Ferritin 380.50 (*)     All other components within normal limits    Narrative:     Results may be falsely decreased if  "patient taking Biotin.     CBC WITH AUTO DIFFERENTIAL - Abnormal; Notable for the following components:    Neutrophil % 80.0 (*)     Lymphocyte % 13.0 (*)     Eosinophil % 0.1 (*)     Neutrophils, Absolute 8.30 (*)     All other components within normal limits   URINALYSIS W/ CULTURE IF INDICATED - Abnormal; Notable for the following components:    Ketones, UA Trace (*)     Protein, UA 30 mg/dL (1+) (*)     Leuk Esterase, UA Small (1+) (*)     All other components within normal limits   URINALYSIS, MICROSCOPIC ONLY - Abnormal; Notable for the following components:    WBC, UA 6-12 (*)     Bacteria, UA Trace (*)     All other components within normal limits   RAPID STREP A SCREEN - Normal   PROCALCITONIN - Normal    Narrative:     As a Marker for Sepsis (Non-Neonates):     1. <0.5 ng/mL represents a low risk of severe sepsis and/or septic shock.  2. >2 ng/mL represents a high risk of severe sepsis and/or septic shock.    As a Marker for Lower Respiratory Tract Infections that require antibiotic therapy:  PCT on Admission     Antibiotic Therapy             6-12 Hrs later  >0.5                          Strongly Recommended            >0.25 - <0.5             Recommended  0.1 - 0.25                  Discouraged                       Remeasure/reassess PCT  <0.1                         Strongly Discouraged         Remeasure/reassess PCT      As 28 day mortality risk marker: \"Change in Procalcitonin Result\" (>80% or <=80%) if Day 0 (or Day 1) and Day 4 values are available. Refer to http://[email or web address]-pct-[email or web address]/    Change in PCT <=80 %   A decrease of PCT levels below or equal to 80% defines a positive change in PCT test result representing a higher risk for 28-day all-cause mortality of patients diagnosed with severe sepsis or septic shock.    Change in PCT >80 %   A decrease of PCT levels of more than 80% defines a negative change in PCT result representing a lower risk for 28-day all-cause mortality of patients " diagnosed with severe sepsis or septic shock.               COVID PRE-OP / PRE-PROCEDURE SCREENING ORDER (NO ISOLATION)    Narrative:     The following orders were created for panel order COVID PRE-OP / PRE-PROCEDURE SCREENING ORDER (NO ISOLATION) - Swab, Nasopharynx.  Procedure                               Abnormality         Status                     ---------                               -----------         ------                     COVID-19,CEPHEID/SANIYA,CO...[[id number]]  Abnormal            Final result                 Please view results for these tests on the individual orders.   URINE CULTURE   CBC AND DIFFERENTIAL    Narrative:     The following orders were created for panel order CBC & Differential.  Procedure                               Abnormality         Status                     ---------                               -----------         ------                     CBC Auto Differential[[id number]]        Abnormal            Final result                 Please view results for these tests on the individual orders.     Medications   lactated ringers bolus 1,000 mL (1,000 mL Intravenous New Bag 2/6/22 1938)     XR Chest AP    Result Date: 2/6/2022    1.  New patchy bilateral airspace disease consistent with multifocal pneumonia.   Electronically Signed By-Will Riley MD On:2/6/2022 7:04 PM This report was finalized on [id number] by  Will Riley MD.    Prior to Admission medications    Medication Sig Start Date End Date Taking? Authorizing Provider   Ascorbic Acid (VITAMIN C PO) Take  by mouth.    Ck Moffett MD   baclofen (LIORESAL) 10 MG tablet TAKE ONE TABLET BY MOUTH THREE TIMES A DAY AS NEEDED FOR MUSCLE SPASMS 1/12/22   Evangelina Lugo APRN   cefdinir (OMNICEF) 300 MG capsule Take 1 capsule by mouth 2 (Two) Times a Day for 10 days. 1/29/22 2/8/22  En Holm APRN   docusate sodium 100 MG capsule Take 100 mg by mouth. 8/25/17   Ck Moffett MD   Multiple  Vitamins-Minerals (ZINC PO) Take  by mouth.    Provider, MD Ck   nitrofurantoin, macrocrystal-monohydrate, (Macrobid) 100 MG capsule Take 1 capsule by mouth 2 (Two) Times a Day. 2/3/22   Evangelina Lugo APRN   omeprazole (priLOSEC) 40 MG capsule TAKE 1 CAPSULE TWICE DAILY 1/17/22   Evangelina Lugo APRN   potassium chloride ER (K-TAB) 20 MEQ tablet controlled-release ER tablet TAKE ONE TABLET BY MOUTH TWICE A DAY 10/21/21   Evangelina Lugo APRN   QUEtiapine (SEROquel) 50 MG tablet TAKE 1 TABLET AT BEDTIME 1/17/22   Evangelina Lugo APRN   terbinafine (LamISIL) 250 MG tablet Take 1 tablet by mouth Daily. 12/22/21   Paris Renteria APRN   VITAMIN D, CHOLECALCIFEROL, PO Take 1 tablet by mouth Daily.    Provider, MD Ck   zolpidem (AMBIEN) 5 MG tablet TAKE ONE TABLET BY MOUTH EVERY NIGHT AT BEDTIME AS NEEDED FOR SLEEP 1/12/22   Evangelina Lugo APRN                                           MDM  Number of Diagnoses or Management Options  Pneumonia due to COVID-19 virus  Weakness  Diagnosis management comments: Chart review: 1/29/22: [facility] urine culture e coli, sinusitis. rx omnicef  2/3: telehealth visit with [facility] Macrobid       Comorbidities:  has a past medical history of Acute bronchitis, Allergic rhinitis, Anxiety, Arthritis, Autoimmune disease (HCC), B12 deficiency, Bursitis of both hips, C. difficile colitis (2013), Carpal tunnel syndrome, Clostridium difficile colitis (2013), DDD (degenerative disc disease), lumbar, Fibromyalgia, GERD (gastroesophageal reflux disease), migraine headaches, IBS (irritable bowel syndrome), Insomnia, Leg cramps, Paresthesia, Peripheral edema, and Pulmonary hypertension ([facility]).  Differentials: Covid pneumonia electrolyte abnormalities dehydration infection not all inclusive of differentials considered  Radiology interpretation:  X-rays reviewed by me and interpreted by radiologist,   XR Chest AP    Result Date: 2/6/2022    1.  New  patchy bilateral airspace disease consistent with multifocal pneumonia.   Electronically Signed By-Will Riley MD On:2/6/2022 7:04 PM This report was finalized on [id number] by  Will Riley MD.    Lab interpretation:  Labs viewed by me significant for, trace bacteria.  Covid positive.    Appropriate PPE worn during exam.  Patient had an IV established and labs obtained and was given a liter of IV fluids.  She is not a candidate for antivirals or monoclonal therapy.  She is already on Macrobid.  Patient was offered admission for IV fluids overnight which she refused and states she wants to go home.  She is not hypoxic.    i discussed findings with patient who voices understanding of discharge instructions, signs and symptoms requiring return to ED; discharged improved and in stable condition with follow up for re-evaluation.           Final diagnoses:   Pneumonia due to COVID-19 virus   Weakness       ED Disposition  ED Disposition     ED Disposition Condition Comment    Discharge Stable           Evangelina Lugo, RADHA  [address] [address]  [phone]    Schedule an appointment as soon as possible for a visit   As needed, If symptoms worsen         Medication List      Stop    cefdinir 300 MG capsule  Commonly known as: Diana Jay, RADHA  02/06/22 2017

## 2022-02-07 LAB — BACTERIA SPEC AEROBE CULT: NO GROWTH

## 2022-02-10 DIAGNOSIS — F51.01 PRIMARY INSOMNIA: ICD-10-CM

## 2022-02-10 RX ORDER — BACLOFEN 10 MG/1
10 TABLET ORAL 3 TIMES DAILY PRN
Qty: 30 TABLET | Refills: 2 | Status: SHIPPED | OUTPATIENT
Start: 2022-02-10 | End: 2022-03-22 | Stop reason: SDUPTHER

## 2022-02-10 RX ORDER — ZOLPIDEM TARTRATE 5 MG/1
5 TABLET ORAL NIGHTLY PRN
Qty: 30 TABLET | Refills: 0 | Status: SHIPPED | OUTPATIENT
Start: 2022-02-10 | End: 2022-02-18 | Stop reason: SDUPTHER

## 2022-02-10 NOTE — TELEPHONE ENCOUNTER
Caller: Krystyna Fabian    Relationship: Self    Best call back number: [phone]    Requested Prescriptions:   Requested Prescriptions     Pending Prescriptions Disp Refills   • baclofen (LIORESAL) 10 MG tablet 30 tablet 2     Sig: Take 1 tablet by mouth 3 (Three) Times a Day As Needed for Muscle Spasms.   • zolpidem (AMBIEN) 5 MG tablet 30 tablet 0     Sig: Take 1 tablet by mouth At Night As Needed for Sleep.        Pharmacy where request should be sent: [facility] - [facility] [address] - [phone] [facility] [phone] FX  [id number] [address] AT [facility] [phone] [facility] [phone] FX     Additional details provided by patient: PATIENT HAS 2 PILLS LEFT OF AMBIEN     Does the patient have less than a 3 day supply:  [] Yes  [] No    Adrian Aburto Rep   02/10/22 10:38 EST

## 2022-02-18 ENCOUNTER — OFFICE VISIT (OUTPATIENT)
Dept: FAMILY MEDICINE CLINIC | Facility: CLINIC | Age: 72
End: 2022-02-18

## 2022-02-18 VITALS
DIASTOLIC BLOOD PRESSURE: 70 MMHG | HEIGHT: 63 IN | WEIGHT: 160 LBS | BODY MASS INDEX: 28.35 KG/M2 | OXYGEN SATURATION: 99 % | HEART RATE: 83 BPM | SYSTOLIC BLOOD PRESSURE: 102 MMHG

## 2022-02-18 DIAGNOSIS — U07.1 PNEUMONIA DUE TO COVID-19 VIRUS: Primary | ICD-10-CM

## 2022-02-18 DIAGNOSIS — J12.82 PNEUMONIA DUE TO COVID-19 VIRUS: Primary | ICD-10-CM

## 2022-02-18 DIAGNOSIS — R60.9 EDEMA, UNSPECIFIED TYPE: ICD-10-CM

## 2022-02-18 DIAGNOSIS — R06.00 DYSPNEA, UNSPECIFIED TYPE: ICD-10-CM

## 2022-02-18 DIAGNOSIS — Z00.00 PREVENTATIVE HEALTH CARE: ICD-10-CM

## 2022-02-18 DIAGNOSIS — F51.01 PRIMARY INSOMNIA: ICD-10-CM

## 2022-02-18 DIAGNOSIS — E55.9 VITAMIN D DEFICIENCY: ICD-10-CM

## 2022-02-18 LAB — D DIMER PPP FEU-MCNC: 1.12 MG/L (FEU) (ref 0–0.59)

## 2022-02-18 PROCEDURE — 80061 LIPID PANEL: CPT | Performed by: NURSE PRACTITIONER

## 2022-02-18 PROCEDURE — 84443 ASSAY THYROID STIM HORMONE: CPT | Performed by: NURSE PRACTITIONER

## 2022-02-18 PROCEDURE — 85379 FIBRIN DEGRADATION QUANT: CPT | Performed by: NURSE PRACTITIONER

## 2022-02-18 PROCEDURE — 80053 COMPREHEN METABOLIC PANEL: CPT | Performed by: NURSE PRACTITIONER

## 2022-02-18 PROCEDURE — 99214 OFFICE O/P EST MOD 30 MIN: CPT | Performed by: NURSE PRACTITIONER

## 2022-02-18 PROCEDURE — 82306 VITAMIN D 25 HYDROXY: CPT | Performed by: NURSE PRACTITIONER

## 2022-02-18 PROCEDURE — 36415 COLL VENOUS BLD VENIPUNCTURE: CPT | Performed by: NURSE PRACTITIONER

## 2022-02-18 PROCEDURE — 85025 COMPLETE CBC W/AUTO DIFF WBC: CPT | Performed by: NURSE PRACTITIONER

## 2022-02-18 PROCEDURE — 85007 BL SMEAR W/DIFF WBC COUNT: CPT | Performed by: NURSE PRACTITIONER

## 2022-02-18 RX ORDER — ZOLPIDEM TARTRATE 10 MG/1
10 TABLET ORAL NIGHTLY PRN
Qty: 30 TABLET | Refills: 0 | Status: SHIPPED | OUTPATIENT
Start: 2022-02-18 | End: 2022-03-16

## 2022-02-18 RX ORDER — CEFDINIR 300 MG/1
300 CAPSULE ORAL 2 TIMES DAILY
Qty: 14 CAPSULE | Refills: 0 | Status: SHIPPED | OUTPATIENT
Start: 2022-02-18 | End: 2022-03-08

## 2022-02-18 RX ORDER — MONTELUKAST SODIUM 10 MG/1
10 TABLET ORAL NIGHTLY
Qty: 90 TABLET | Refills: 1 | Status: SHIPPED | OUTPATIENT
Start: 2022-02-18 | End: 2022-06-13 | Stop reason: SDUPTHER

## 2022-02-18 RX ORDER — MONTELUKAST SODIUM 10 MG/1
10 TABLET ORAL NIGHTLY
Qty: 30 TABLET | Refills: 0 | Status: SHIPPED | OUTPATIENT
Start: 2022-02-18 | End: 2022-02-18

## 2022-02-18 NOTE — ASSESSMENT & PLAN NOTE
1.  Repeat chest x-ray  2.  Start Trelegy 1 puff daily  3.  Check D-dimer  4.  Start Singulair 10 mg nightly  5.  Follow-up in 2 weeks

## 2022-02-18 NOTE — TELEPHONE ENCOUNTER
Rx Refill Note  Requested Prescriptions     Pending Prescriptions Disp Refills   • montelukast (SINGULAIR) 10 MG tablet [Pharmacy Med Name: MONTELUKAST 10MG TABLETS] 90 tablet      Sig: TAKE 1 TABLET BY MOUTH EVERY NIGHT      Last office visit with prescribing clinician: 2/18/2022      Next office visit with prescribing clinician: 3/8/2022            Ewa Dukes MA  02/18/22, 09:59 EST

## 2022-02-18 NOTE — PROGRESS NOTES
"Chief Complaint  Insomnia (having a lot of sleeping issues for the last 4-5 months ) and Shortness of Breath (had covid pneumonia in January)    Subjective          Krystyna Fabian presents to [facility]  History of Present Illness    Patient presents with dyspnea at rest worse with exertion, sinus headaches, productive cough and wheezing.  Patient tested + for COVID-19 on , seen in ER.  Chest x-ray showed new patchy bilateral airspace disease consistent with multifocal pneumonia. Patient denies fever or chest pain.     Patient c/o difficulty sleeping over the last 3-4 months. She reports the most sleep she will get is 3-4 hours nightly. Patient has difficulty falling and staying asleep. Patient denies that she snores. She has no episodes of witnessed apnea. Patient reports previous sleep study was normal.     Objective   Vital Signs:   /70 (BP Location: Left arm, Patient Position: Sitting, Cuff Size: Adult)   Pulse 83   Ht 160 cm (63\")   Wt 72.6 kg (160 lb)   SpO2 99%   BMI 28.34 kg/m²       Physical Exam  Constitutional:       Appearance: Normal appearance.   HENT:      Head: Normocephalic.   Cardiovascular:      Rate and Rhythm: Normal rate and regular rhythm.   Pulmonary:      Effort: Pulmonary effort is normal.      Breath sounds: Examination of the right-lower field reveals decreased breath sounds. Decreased breath sounds present.   Abdominal:      General: Abdomen is flat. Bowel sounds are normal.      Palpations: Abdomen is soft.   Musculoskeletal:         General: Normal range of motion.      Cervical back: Neck supple.      Right lower le+ Edema present.      Left lower le+ Edema present.   Skin:     General: Skin is warm and dry.   Neurological:      Mental Status: She is alert and oriented to person, place, and time.      Gait: Gait is intact.   Psychiatric:         Attention and Perception: Attention normal.         Mood and Affect: Mood normal.  "        Speech: Speech normal.        Result Review :                 Assessment and Plan    Diagnoses and all orders for this visit:    1. Pneumonia due to COVID-19 virus (Primary)  Assessment & Plan:  1.  Repeat chest x-ray  2.  Start Trelegy 1 puff daily  3.  Check D-dimer  4.  Start Singulair 10 mg nightly  5.  Follow-up in 2 weeks    Orders:  -     XR Chest PA & Lateral; Future    2. Primary insomnia  Assessment & Plan:  1.  Increase Ambien to 10 mg nightly  2.  If no improvement, refer to sleep medicine    Orders:  -     zolpidem (AMBIEN) 10 MG tablet; Take 1 tablet by mouth At Night As Needed for Sleep.  Dispense: 30 tablet; Refill: 0    3. Edema, unspecified type  -     D-dimer, Quantitative    4. Preventative health care  Assessment & Plan:  1.  Check labs    Orders:  -     CBC & Differential  -     Comprehensive Metabolic Panel  -     Lipid Panel  -     TSH    5. Vitamin D deficiency  -     Vitamin D 25 Hydroxy    6. Dyspnea, unspecified type  Assessment & Plan:  1.  Check D-dimer    Orders:  -     D-dimer, Quantitative    Other orders  -     montelukast (Singulair) 10 MG tablet; Take 1 tablet by mouth Every Night.  Dispense: 30 tablet; Refill: 0    I spent 30 minutes caring for Krystyna on this date of service. This time includes time spent by me in the following activities:preparing for the visit, reviewing tests, obtaining and/or reviewing a separately obtained history, performing a medically appropriate examination and/or evaluation , counseling and educating the patient/family/caregiver, ordering medications, tests, or procedures, documenting information in the medical record, independently interpreting results and communicating that information with the patient/family/caregiver and care coordination  Follow Up   Return in about 2 weeks (around 3/4/2022).  Patient was given instructions and counseling regarding her condition or for health maintenance advice. Please see specific information pulled into  the AVS if appropriate.

## 2022-02-18 NOTE — PROGRESS NOTES
Patient's chest x-ray still shows pneumonia.  I have sent in cefdinir for her to take twice daily for 1 week.  Use inhaler as provided today and still start the Singulair, use at night.  Call with any new or worsening chest pain or difficulty breathing

## 2022-02-19 DIAGNOSIS — U07.1 PNEUMONIA DUE TO COVID-19 VIRUS: Primary | ICD-10-CM

## 2022-02-19 DIAGNOSIS — J12.82 PNEUMONIA DUE TO COVID-19 VIRUS: Primary | ICD-10-CM

## 2022-02-19 DIAGNOSIS — R06.00 DYSPNEA, UNSPECIFIED TYPE: ICD-10-CM

## 2022-02-19 DIAGNOSIS — R60.9 EDEMA, UNSPECIFIED TYPE: ICD-10-CM

## 2022-02-19 DIAGNOSIS — R79.89 ELEVATED D-DIMER: ICD-10-CM

## 2022-02-19 LAB
25(OH)D3 SERPL-MCNC: 39 NG/ML (ref 30–100)
ALBUMIN SERPL-MCNC: 3.5 G/DL (ref 3.5–5.2)
ALBUMIN/GLOB SERPL: 1.2 G/DL
ALP SERPL-CCNC: 159 U/L (ref 39–117)
ALT SERPL W P-5'-P-CCNC: 51 U/L (ref 1–33)
ANION GAP SERPL CALCULATED.3IONS-SCNC: 14 MMOL/L (ref 5–15)
AST SERPL-CCNC: 21 U/L (ref 1–32)
BILIRUB SERPL-MCNC: 0.3 MG/DL (ref 0–1.2)
BUN SERPL-MCNC: 31 MG/DL (ref 8–23)
BUN/CREAT SERPL: 43.7 (ref 7–25)
CALCIUM SPEC-SCNC: 9 MG/DL (ref 8.6–10.5)
CHLORIDE SERPL-SCNC: 107 MMOL/L (ref 98–107)
CHOLEST SERPL-MCNC: 178 MG/DL (ref 0–200)
CO2 SERPL-SCNC: 19 MMOL/L (ref 22–29)
CREAT SERPL-MCNC: 0.71 MG/DL (ref 0.57–1)
DEPRECATED RDW RBC AUTO: 43.9 FL (ref 37–54)
ERYTHROCYTE [DISTWIDTH] IN BLOOD BY AUTOMATED COUNT: 13.8 % (ref 12.3–15.4)
GFR SERPL CREATININE-BSD FRML MDRD: 81 ML/MIN/1.73
GLOBULIN UR ELPH-MCNC: 2.9 GM/DL
GLUCOSE SERPL-MCNC: 67 MG/DL (ref 65–99)
HCT VFR BLD AUTO: 41.6 % (ref 34–46.6)
HDLC SERPL-MCNC: 43 MG/DL (ref 40–60)
HGB BLD-MCNC: 13.5 G/DL (ref 12–15.9)
LDLC SERPL CALC-MCNC: 67 MG/DL (ref 0–100)
LDLC/HDLC SERPL: 1.1 {RATIO}
LYMPHOCYTES # BLD MANUAL: 8.11 10*3/MM3 (ref 0.7–3.1)
LYMPHOCYTES NFR BLD MANUAL: 6 % (ref 5–12)
MCH RBC QN AUTO: 29.1 PG (ref 26.6–33)
MCHC RBC AUTO-ENTMCNC: 32.5 G/DL (ref 31.5–35.7)
MCV RBC AUTO: 89.7 FL (ref 79–97)
MONOCYTES # BLD: 1.32 10*3/MM3 (ref 0.1–0.9)
NEUTROPHILS # BLD AUTO: 11.4 10*3/MM3 (ref 1.7–7)
NEUTROPHILS NFR BLD MANUAL: 52 % (ref 42.7–76)
PLAT MORPH BLD: NORMAL
PLATELET # BLD AUTO: 571 10*3/MM3 (ref 140–450)
PMV BLD AUTO: 11 FL (ref 6–12)
POTASSIUM SERPL-SCNC: 2.8 MMOL/L (ref 3.5–5.2)
PROT SERPL-MCNC: 6.4 G/DL (ref 6–8.5)
RBC # BLD AUTO: 4.64 10*6/MM3 (ref 3.77–5.28)
RBC MORPH BLD: NORMAL
SMUDGE CELLS BLD QL SMEAR: ABNORMAL
SODIUM SERPL-SCNC: 140 MMOL/L (ref 136–145)
TRIGL SERPL-MCNC: 438 MG/DL (ref 0–150)
TSH SERPL DL<=0.05 MIU/L-ACNC: 0.36 UIU/ML (ref 0.27–4.2)
VARIANT LYMPHS NFR BLD MANUAL: 37 % (ref 19.6–45.3)
VLDLC SERPL-MCNC: 68 MG/DL (ref 5–40)
WBC NRBC COR # BLD: 21.93 10*3/MM3 (ref 3.4–10.8)

## 2022-02-21 ENCOUNTER — TELEPHONE (OUTPATIENT)
Dept: FAMILY MEDICINE CLINIC | Facility: CLINIC | Age: 72
End: 2022-02-21

## 2022-02-21 NOTE — PROGRESS NOTES
Patient appears mildly dehydrated.  Her potassium is also low.  Please advise she needs to take 40 mEq daily x3 days then return to her normal dosing.  Her triglycerides have significantly elevated over the last year.  I want her to start taking a daily fish oil and really work on limiting fried and fatty foods.  White blood cell count is elevated, consistent with pneumonia.  Patient placed on antibiotics on Friday.  Please call this morning and see how she is feeling.  Her D-dimer was slightly elevated, likely due to COVID-19 pneumonia but I did order additional testing to ensure there are no blood clots.

## 2022-02-21 NOTE — TELEPHONE ENCOUNTER
Caller: Krystyna Fabian    Relationship: Self     Best call back number: [phone] (H)  What test was performed: A CHEST X-RAY AND LAB WORK     When was the test performed: 02/18/22     Where was the test performed: AT THE OFFICE, X-RAY WAS AT [facility]     Additional notes:     PLEASE CALL AND ADVISE

## 2022-02-22 NOTE — TELEPHONE ENCOUNTER
Spoke with patient and let her know about testing. Will work on auth and getting patient scheduled. Also, let her know she can get the fish oil right over the counter and she expressed understanding.

## 2022-02-22 NOTE — TELEPHONE ENCOUNTER
No it does not appear the message was looked at yesterday so it was just seen today and I am working on the auth now for the CT. I will call pt once the auth is done and I will call the patient with the phone number after that.

## 2022-02-22 NOTE — TELEPHONE ENCOUNTER
Read Lab and X-Ray results to patient. Would like to have call back about information for the additional testing and would also like to know how much fish oil Pilar recommends her taking.

## 2022-02-22 NOTE — TELEPHONE ENCOUNTER
1g daily on Fish Oil. Please see what additional information she needs regarding testing. Have US and CT chest been scheduled?

## 2022-02-24 ENCOUNTER — TELEPHONE (OUTPATIENT)
Dept: FAMILY MEDICINE CLINIC | Facility: CLINIC | Age: 72
End: 2022-02-24

## 2022-02-24 NOTE — TELEPHONE ENCOUNTER
Spoke with patient and let her know we did not put a refill on it and that the pharmacy may still owe her pills from the original prescription. After she checked the bottle that was the case so the patient will contact the pharmacy to get the remaining medication so she can take it for the full 7 days.

## 2022-02-24 NOTE — TELEPHONE ENCOUNTER
PATIENT IS CALLING IN SHE IS ASKING IF SHE IS SUPPOSED TO REFILL THE CEFDINIR SHE NOTICED ON THE BOTTLE THAT IT HAS REFILLS ON IT AND SHE WAS NOT SURE IF SHE IS TO REFILL TILL THERE ARE NO MORE REFILLS LEFT.      PLEASE ADVISE    CALLBACK NUMBER IS    [phone]

## 2022-02-25 ENCOUNTER — TELEPHONE (OUTPATIENT)
Dept: FAMILY MEDICINE CLINIC | Facility: CLINIC | Age: 72
End: 2022-02-25

## 2022-02-25 RX ORDER — FLUCONAZOLE 150 MG/1
150 TABLET ORAL ONCE
Qty: 2 TABLET | Refills: 0 | Status: SHIPPED | OUTPATIENT
Start: 2022-02-25 | End: 2022-02-25

## 2022-02-25 NOTE — TELEPHONE ENCOUNTER
I sent 2 doses of Diflucan to the pharmacy.  She may take 1 today and then repeat a dose in 72 hours if necessary

## 2022-02-25 NOTE — TELEPHONE ENCOUNTER
Caller: Krystyna Fabian ARPIT    Relationship: Self    Best call back number: [phone]    What medication are you requesting: FLUCONAZOLE    What are your current symptoms: ITCHY, BURNING, DISCHARGE    How long have you been experiencing symptoms: 2 DAYS    Have you had these symptoms before:    [] Yes  [x] No    Have you been treated for these symptoms before:   [] Yes  [x] No    If a prescription is needed, what is your preferred pharmacy and phone number:       [id number] [address] AT [facility] - [phone]  - [phone]   [phone]    Additional notes: PATIENT REQUEST MEDICATION FOR VAGINAL YEAST INFECTION.

## 2022-03-07 ENCOUNTER — HOSPITAL ENCOUNTER (OUTPATIENT)
Dept: CARDIOLOGY | Facility: HOSPITAL | Age: 72
Discharge: HOME | End: 2022-03-07
Admitting: NURSE PRACTITIONER

## 2022-03-07 ENCOUNTER — APPOINTMENT (OUTPATIENT)
Dept: CT IMAGING | Facility: HOSPITAL | Age: 72
End: 2022-03-07

## 2022-03-07 DIAGNOSIS — R79.89 ELEVATED D-DIMER: ICD-10-CM

## 2022-03-07 DIAGNOSIS — R60.9 EDEMA, UNSPECIFIED TYPE: ICD-10-CM

## 2022-03-07 LAB

## 2022-03-07 PROCEDURE — 93970 EXTREMITY STUDY: CPT

## 2022-03-08 ENCOUNTER — OFFICE VISIT (OUTPATIENT)
Dept: FAMILY MEDICINE CLINIC | Facility: CLINIC | Age: 72
End: 2022-03-08

## 2022-03-08 VITALS
BODY MASS INDEX: 28.35 KG/M2 | SYSTOLIC BLOOD PRESSURE: 128 MMHG | HEIGHT: 63 IN | WEIGHT: 160 LBS | OXYGEN SATURATION: 99 % | HEART RATE: 78 BPM | DIASTOLIC BLOOD PRESSURE: 80 MMHG

## 2022-03-08 DIAGNOSIS — R79.89 ELEVATED D-DIMER: ICD-10-CM

## 2022-03-08 DIAGNOSIS — R60.9 EDEMA, UNSPECIFIED TYPE: ICD-10-CM

## 2022-03-08 DIAGNOSIS — J12.82 PNEUMONIA DUE TO COVID-19 VIRUS: Primary | ICD-10-CM

## 2022-03-08 DIAGNOSIS — K21.9 GASTROESOPHAGEAL REFLUX DISEASE WITHOUT ESOPHAGITIS: ICD-10-CM

## 2022-03-08 DIAGNOSIS — U07.1 PNEUMONIA DUE TO COVID-19 VIRUS: Primary | ICD-10-CM

## 2022-03-08 LAB
BASOPHILS # BLD AUTO: 0.07 10*3/MM3 (ref 0–0.2)
BASOPHILS NFR BLD AUTO: 0.8 % (ref 0–1.5)
D DIMER PPP FEU-MCNC: 0.68 MG/L (FEU) (ref 0–0.59)
DEPRECATED RDW RBC AUTO: 47.1 FL (ref 37–54)
EOSINOPHIL # BLD AUTO: 0.2 10*3/MM3 (ref 0–0.4)
EOSINOPHIL NFR BLD AUTO: 2.3 % (ref 0.3–6.2)
ERYTHROCYTE [DISTWIDTH] IN BLOOD BY AUTOMATED COUNT: 14.3 % (ref 12.3–15.4)
HCT VFR BLD AUTO: 40.6 % (ref 34–46.6)
HGB BLD-MCNC: 13 G/DL (ref 12–15.9)
IMM GRANULOCYTES # BLD AUTO: 0.11 10*3/MM3 (ref 0–0.05)
IMM GRANULOCYTES NFR BLD AUTO: 1.3 % (ref 0–0.5)
LYMPHOCYTES # BLD AUTO: 3.06 10*3/MM3 (ref 0.7–3.1)
LYMPHOCYTES NFR BLD AUTO: 35.9 % (ref 19.6–45.3)
MCH RBC QN AUTO: 29.1 PG (ref 26.6–33)
MCHC RBC AUTO-ENTMCNC: 32 G/DL (ref 31.5–35.7)
MCV RBC AUTO: 90.8 FL (ref 79–97)
MONOCYTES # BLD AUTO: 0.89 10*3/MM3 (ref 0.1–0.9)
MONOCYTES NFR BLD AUTO: 10.4 % (ref 5–12)
NEUTROPHILS NFR BLD AUTO: 4.19 10*3/MM3 (ref 1.7–7)
NEUTROPHILS NFR BLD AUTO: 49.3 % (ref 42.7–76)
NRBC BLD AUTO-RTO: 0 /100 WBC (ref 0–0.2)
PLATELET # BLD AUTO: 374 10*3/MM3 (ref 140–450)
PMV BLD AUTO: 10.8 FL (ref 6–12)
RBC # BLD AUTO: 4.47 10*6/MM3 (ref 3.77–5.28)
WBC NRBC COR # BLD: 8.52 10*3/MM3 (ref 3.4–10.8)

## 2022-03-08 PROCEDURE — 36415 COLL VENOUS BLD VENIPUNCTURE: CPT | Performed by: NURSE PRACTITIONER

## 2022-03-08 PROCEDURE — 85379 FIBRIN DEGRADATION QUANT: CPT | Performed by: NURSE PRACTITIONER

## 2022-03-08 PROCEDURE — 80048 BASIC METABOLIC PNL TOTAL CA: CPT | Performed by: NURSE PRACTITIONER

## 2022-03-08 PROCEDURE — 85025 COMPLETE CBC W/AUTO DIFF WBC: CPT | Performed by: NURSE PRACTITIONER

## 2022-03-08 PROCEDURE — 99214 OFFICE O/P EST MOD 30 MIN: CPT | Performed by: NURSE PRACTITIONER

## 2022-03-08 RX ORDER — ALBUTEROL SULFATE 90 UG/1
2 AEROSOL, METERED RESPIRATORY (INHALATION) EVERY 4 HOURS PRN
Qty: 18 G | Refills: 0 | Status: SHIPPED | OUTPATIENT
Start: 2022-03-08 | End: 2022-07-07

## 2022-03-08 RX ORDER — PANTOPRAZOLE SODIUM 40 MG/1
40 TABLET, DELAYED RELEASE ORAL DAILY
Qty: 30 TABLET | Refills: 2 | Status: SHIPPED | OUTPATIENT
Start: 2022-03-08 | End: 2022-04-19

## 2022-03-08 NOTE — PROGRESS NOTES
"Chief Complaint  Follow-up (2 week follow up pneumonia)    Subjective          Krystyna Fabian presents to [facility]  History of Present Illness    Patient presented to office on  with c/o dyspnea at rest worse with exertion, sinus headaches, productive cough and wheezing. Patient tested + for COVID-19 on . Chest x-ray at the time showed new patchy bilateral airspace disease consistent with multifocal pneumonia.  Started on Trelegy 1 puff daily and Singulair 10 mg nightly.  Repeat CXR on  showed bilateral pneumonia.  Patient completed regimen of cefdinir.  D-dimer also elevated. Venous Doppler negative for DVT.  Patient reports she does feel better, cough improving.  She has minimal wheezing and reports dyspnea at rest has resolved.  She continues to have some exertional dyspnea.  Patient denies chest pain or fever.  Patient is also complaining of worsening indigestion and heartburn since having COVID-19.  She is currently taking omeprazole 40 mg twice daily.    Objective   Vital Signs:   /80 (BP Location: Left arm, Patient Position: Sitting, Cuff Size: Adult)   Pulse 78   Ht 160 cm (63\")   Wt 72.6 kg (160 lb)   SpO2 99%   BMI 28.34 kg/m²       Physical Exam  Constitutional:       Appearance: Normal appearance.   HENT:      Head: Normocephalic.   Cardiovascular:      Rate and Rhythm: Normal rate and regular rhythm.   Pulmonary:      Effort: Pulmonary effort is normal.      Breath sounds: Normal breath sounds.   Abdominal:      General: Abdomen is flat. Bowel sounds are normal.      Palpations: Abdomen is soft.   Musculoskeletal:         General: Normal range of motion.      Cervical back: Neck supple.      Right lower le+ Edema present.      Left lower le+ Edema present.   Skin:     General: Skin is warm and dry.   Neurological:      Mental Status: She is alert and oriented to person, place, and time.      Gait: Gait is intact.   Psychiatric:         " Attention and Perception: Attention normal.         Mood and Affect: Mood normal.         Speech: Speech normal.        Result Review :     CMP    CMP 7/26/21 2/6/22 2/18/22   Glucose 72 109 (A) 67   BUN 8 18 31 (A)   Creatinine 0.80 0.84 0.71   eGFR Non African Am 71 67 81   Sodium 143 137 140   Potassium 3.3 (A) 3.4 (A) 2.8 (A)   Chloride 108 (A) 104 107   Calcium 8.8 8.4 (A) 9.0   Albumin  3.30 (A) 3.50   Total Bilirubin  0.3 0.3   Alkaline Phosphatase  155 (A) 159 (A)   AST (SGOT)  82 (A) 21   ALT (SGPT)  73 (A) 51 (A)   (A) Abnormal value       Comments are available for some flowsheets but are not being displayed.           CBC    CBC 6/11/21 2/6/22 2/18/22   WBC 10.87 (A) 10.40 21.93 (A)   RBC 4.86 4.95 4.64   Hemoglobin 14.6 14.3 13.5   Hematocrit 43.4 43.3 41.6   MCV 89.3 87.5 89.7   MCH 30.0 28.8 29.1   MCHC 33.6 32.9 32.5   RDW 13.0 15.0 13.8   Platelets 304 290 571 (A)   (A) Abnormal value            Lipid Panel    Lipid Panel 4/13/21 2/18/22   Total Cholesterol 206 (A) 178   Triglycerides 96 438 (A)   HDL Cholesterol 58 43   VLDL Cholesterol 17 68 (A)   LDL Cholesterol  131 (A) 67   LDL/HDL Ratio 2.22 1.10   (A) Abnormal value            TSH    TSH 4/13/21 2/18/22   TSH 1.180 0.358                     Assessment and Plan    Diagnoses and all orders for this visit:    1. Pneumonia due to COVID-19 virus (Primary)  Assessment & Plan:  1.  Reviewed chest x-ray with patient  2.  Continue Trelegy 1 puff daily  3.  Continue Singulair  4.  Repeat chest x-ray in 1 week    Orders:  -     CBC & Differential  -     Basic Metabolic Panel  -     XR Chest 2 View; Future    2. Elevated d-dimer  Assessment & Plan:  1.  Patient did not have CT scan of her chest  2.  We will repeat D-dimer today per her request and proceed with CT scan if D-dimer is unchanged or increased    Orders:  -     D-dimer, Quantitative    3. Edema, unspecified type  Assessment & Plan:  1.  Increase water intake  2.  Decrease sodium intake  3.   Elevate legs  4.  Wear compression hose      4. Gastroesophageal reflux disease without esophagitis  Assessment & Plan:  1.  Discontinue omeprazole  2.  Start Protonix 40 mg daily      Other orders  -     pantoprazole (Protonix) 40 MG EC tablet; Take 1 tablet by mouth Daily.  Dispense: 30 tablet; Refill: 2  -     albuterol sulfate  (90 Base) MCG/ACT inhaler; Inhale 2 puffs Every 4 (Four) Hours As Needed for Wheezing or Shortness of Air.  Dispense: 18 g; Refill: 0    I spent 30 minutes caring for Krystyna on this date of service. This time includes time spent by me in the following activities:preparing for the visit, reviewing tests, obtaining and/or reviewing a separately obtained history, performing a medically appropriate examination and/or evaluation , counseling and educating the patient/family/caregiver, ordering medications, tests, or procedures, documenting information in the medical record, independently interpreting results and communicating that information with the patient/family/caregiver and care coordination  Follow Up   Return in about 4 weeks (around 4/5/2022).  Patient was given instructions and counseling regarding her condition or for health maintenance advice. Please see specific information pulled into the AVS if appropriate.

## 2022-03-08 NOTE — ASSESSMENT & PLAN NOTE
1.  Increase water intake  2.  Decrease sodium intake  3.  Elevate legs  4.  Wear compression hose

## 2022-03-08 NOTE — ASSESSMENT & PLAN NOTE
1.  Reviewed chest x-ray with patient  2.  Continue Trelegy 1 puff daily  3.  Continue Singulair  4.  Repeat chest x-ray in 1 week

## 2022-03-08 NOTE — ASSESSMENT & PLAN NOTE
1.  Patient did not have CT scan of her chest  2.  We will repeat D-dimer today per her request and proceed with CT scan if D-dimer is unchanged or increased

## 2022-03-09 ENCOUNTER — TELEPHONE (OUTPATIENT)
Dept: FAMILY MEDICINE CLINIC | Facility: CLINIC | Age: 72
End: 2022-03-09

## 2022-03-09 LAB
ANION GAP SERPL CALCULATED.3IONS-SCNC: 11.5 MMOL/L (ref 5–15)
BUN SERPL-MCNC: 14 MG/DL (ref 8–23)
BUN/CREAT SERPL: 19.4 (ref 7–25)
CALCIUM SPEC-SCNC: 9.4 MG/DL (ref 8.6–10.5)
CHLORIDE SERPL-SCNC: 109 MMOL/L (ref 98–107)
CO2 SERPL-SCNC: 19.5 MMOL/L (ref 22–29)
CREAT SERPL-MCNC: 0.72 MG/DL (ref 0.57–1)
EGFRCR SERPLBLD CKD-EPI 2021: 90.1 ML/MIN/1.73
GLUCOSE SERPL-MCNC: 87 MG/DL (ref 65–99)
POTASSIUM SERPL-SCNC: 3.8 MMOL/L (ref 3.5–5.2)
SODIUM SERPL-SCNC: 140 MMOL/L (ref 136–145)

## 2022-03-09 NOTE — TELEPHONE ENCOUNTER
Left message for patient to call regarding results.    HUB TO READ:   Kidney function improved. WBC count has normalized. D-dimer is trending downward so we will monitor. Complete CXR next week

## 2022-03-09 NOTE — PROGRESS NOTES
Kidney function improved. WBC count has normalized. D-dimer is trending downward so we will monitor. Complete CXR next week

## 2022-03-09 NOTE — TELEPHONE ENCOUNTER
----- Message from RADHA Ojeda sent at 3/9/2022  8:03 AM EST -----  Kidney function improved. WBC count has normalized. D-dimer is trending downward so we will monitor. Complete CXR next week

## 2022-03-10 NOTE — TELEPHONE ENCOUNTER
Left message for patient to call     HUB TO READ:   Liver function is trending downward but we will continue to monitor.  Her potassium was normal at 3.8 but that was with her supplementation.  I want her to continue her daily potassium and we will monitor.  If her potassium level continues to trend upward, we will decrease dose and consider discontinuing

## 2022-03-10 NOTE — TELEPHONE ENCOUNTER
HUB RELAYED MESSAGE.    PATIENT WANTS TO KNOW IF SHE NEEDS TO CONTINUE POTASSIUM AND WAS LIVER FUNCTION OK?

## 2022-03-10 NOTE — TELEPHONE ENCOUNTER
Liver function is trending downward but we will continue to monitor.  Her potassium was normal at 3.8 but that was with her supplementation.  I want her to continue her daily potassium and we will monitor.  If her potassium level continues to trend upward, we will decrease dose and consider discontinuing

## 2022-03-10 NOTE — TELEPHONE ENCOUNTER
HUB READ THE NOTE FROM HWOARD ON 3/10/22 @ 10:52 AM TO THE PATIENT.  PATIENT STATED THAT SHE UNDERSTOOD.

## 2022-03-16 ENCOUNTER — TELEPHONE (OUTPATIENT)
Dept: FAMILY MEDICINE CLINIC | Facility: CLINIC | Age: 72
End: 2022-03-16

## 2022-03-16 DIAGNOSIS — F51.01 PRIMARY INSOMNIA: ICD-10-CM

## 2022-03-16 RX ORDER — ZOLPIDEM TARTRATE 10 MG/1
10 TABLET ORAL NIGHTLY PRN
Qty: 30 TABLET | Refills: 0 | Status: SHIPPED | OUTPATIENT
Start: 2022-03-16 | End: 2022-04-13

## 2022-03-16 NOTE — TELEPHONE ENCOUNTER
Caller: Krystyna Fabian    Relationship: Self    Best call back number: [phone]    What test was performed: CHEST XRAY     When was the test performed: 03/16/22    Where was the test performed: [facility]     Additional notes: PLEASE CALL AND ADVISE PATIENT.

## 2022-03-22 ENCOUNTER — TELEPHONE (OUTPATIENT)
Dept: FAMILY MEDICINE CLINIC | Facility: CLINIC | Age: 72
End: 2022-03-22

## 2022-03-22 RX ORDER — BACLOFEN 10 MG/1
10 TABLET ORAL 3 TIMES DAILY PRN
Qty: 30 TABLET | Refills: 2 | Status: SHIPPED | OUTPATIENT
Start: 2022-03-22 | End: 2022-04-15 | Stop reason: SDUPTHER

## 2022-03-22 NOTE — TELEPHONE ENCOUNTER
Caller: FabianKrystyna    Relationship: Self    Best call back number: [phone]     Requested Prescriptions:    baclofen (LIORESAL) 10 MG tablet            Pharmacy where request should be sent:    [facility] - [place] [id number] [facility] - [phone]  - [phone] FX  [phone]    Additional details provided by patient: PATIENT IS CALLING TO REQUEST A NEW 90 DAY PRESCRIPTION WITH REFILLS OF THE ABOVE MEDICATION.    Does the patient have less than a 3 day supply:  [x] Yes  [] No    Ita Richarded Rep   03/22/22 09:31 EDT     PLEASE ADVISE.

## 2022-04-13 DIAGNOSIS — F51.01 PRIMARY INSOMNIA: ICD-10-CM

## 2022-04-13 RX ORDER — ZOLPIDEM TARTRATE 10 MG/1
10 TABLET ORAL NIGHTLY PRN
Qty: 30 TABLET | Refills: 0 | Status: SHIPPED | OUTPATIENT
Start: 2022-04-13 | End: 2022-05-12

## 2022-04-14 ENCOUNTER — OFFICE VISIT (OUTPATIENT)
Dept: PODIATRY | Facility: CLINIC | Age: 72
End: 2022-04-14

## 2022-04-14 VITALS
BODY MASS INDEX: 28.35 KG/M2 | HEART RATE: 71 BPM | SYSTOLIC BLOOD PRESSURE: 103 MMHG | WEIGHT: 160 LBS | HEIGHT: 63 IN | DIASTOLIC BLOOD PRESSURE: 72 MMHG

## 2022-04-14 DIAGNOSIS — M72.2 PLANTAR FASCIITIS OF LEFT FOOT: ICD-10-CM

## 2022-04-14 DIAGNOSIS — M79.672 LEFT FOOT PAIN: Primary | ICD-10-CM

## 2022-04-14 PROCEDURE — 99213 OFFICE O/P EST LOW 20 MIN: CPT | Performed by: PODIATRIST

## 2022-04-14 PROCEDURE — 20550 NJX 1 TENDON SHEATH/LIGAMENT: CPT | Performed by: PODIATRIST

## 2022-04-14 RX ORDER — TRIAMCINOLONE ACETONIDE 40 MG/ML
20 INJECTION, SUSPENSION INTRA-ARTICULAR; INTRAMUSCULAR ONCE
Status: COMPLETED | OUTPATIENT
Start: 2022-04-14 | End: 2022-04-14

## 2022-04-14 RX ADMIN — TRIAMCINOLONE ACETONIDE 20 MG: 40 INJECTION, SUSPENSION INTRA-ARTICULAR; INTRAMUSCULAR at 11:02

## 2022-04-14 NOTE — PROGRESS NOTES
04/14/2022  Foot and Ankle Surgery - Established Patient/Follow-up  Provider: Dr. Reginald Ro DPM  Location: [facility]    Subjective:  Krystyna Fabian is a 72 y.o. female.     Chief Complaint   Patient presents with   • Left Foot - Pain   • Follow-up     Last pcp appt with OBEDLit Lugo APRN 3/8/2022       HPI: Patient returns to office for new issue involving her left foot.  She has had progressive pain involving the plantar aspect of the left heel.  She states that the symptoms are likely consistent with plantar fasciitis and similar to her previous right foot pain.  She states that symptoms have been gradually progressive over the last few months causing limitation with daily activity.  She is worried that symptoms will continue to progress.  She denies any recent injury.    Allergies   Allergen Reactions   • Butorphanol Itching     stadol   • Erythromycin Other (See Comments)     CAUSES HYPERTENSION         Current Outpatient Medications on File Prior to Visit   Medication Sig Dispense Refill   • albuterol sulfate  (90 Base) MCG/ACT inhaler Inhale 2 puffs Every 4 (Four) Hours As Needed for Wheezing or Shortness of Air. 18 g 0   • Ascorbic Acid (VITAMIN C PO) Take  by mouth.     • baclofen (LIORESAL) 10 MG tablet Take 1 tablet by mouth 3 (Three) Times a Day As Needed for Muscle Spasms. 30 tablet 2   • docusate sodium 100 MG capsule Take 100 mg by mouth.     • montelukast (SINGULAIR) 10 MG tablet TAKE 1 TABLET BY MOUTH EVERY NIGHT 90 tablet 1   • Multiple Vitamins-Minerals (ZINC PO) Take  by mouth.     • pantoprazole (Protonix) 40 MG EC tablet Take 1 tablet by mouth Daily. 30 tablet 2   • potassium chloride ER (K-TAB) 20 MEQ tablet controlled-release ER tablet TAKE ONE TABLET BY MOUTH TWICE A DAY 60 tablet 2   • QUEtiapine (SEROquel) 50 MG tablet TAKE 1 TABLET AT BEDTIME 90 tablet 0   • terbinafine (LamISIL) 250 MG tablet Take 1 tablet by mouth Daily. 30 tablet 2   • VITAMIN D,  "CHOLECALCIFEROL, PO Take 1 tablet by mouth Daily.     • zolpidem (AMBIEN) 10 MG tablet TAKE 1 TABLET BY MOUTH AT NIGHT AS NEEDED FOR SLEEP 30 tablet 0     Current Facility-Administered Medications on File Prior to Visit   Medication Dose Route Frequency Provider Last Rate Last Admin   • [DISCONTINUED] triamcinolone acetonide (KENALOG-40) injection 80 mg  80 mg Intramuscular Once Sadiq Parker MD           Objective   /72   Pulse 71   Ht 160 cm (63\")   Wt 72.6 kg (160 lb)   BMI 28.34 kg/m²     Foot/Ankle Exam:       General:   Appearance: appears stated age and healthy    Orientation: AAOx3    Affect: appropriate    Gait: antalgic      VASCULAR      Left Foot Vascularity   Normal vascular exam    Dorsalis pedis:  2+  Posterior tibial:  2+  Skin Temperature: warm    Edema Grading:  None  CFT:  < 3 seconds  Pedal Hair Growth:  Present  Varicosities: none        NEUROLOGIC     Left Foot Neurologic   Light touch sensation:  Normal  Hot/cold sensation: normal    Achilles reflex:  2+     MUSCULOSKELETAL      Left Foot Musculoskeletal   Ecchymosis:  None  Tenderness: plantar fascia and plantar heel    Arch:  Normal     MUSCLE STRENGTH     Left Foot Muscle Strength   Normal strength    Foot dorsiflexion:  5  Foot plantar flexion:  5  Foot inversion:  5  Foot eversion:  5     DERMATOLOGIC     Left Foot Dermatologic   Skin: skin intact        Left Foot Additional Comments: Discomfort with palpation involving the plantar medial calcaneal tuberosity.  No gross deformity or instability.  Moderate equinus contracture with knee extended and flexed      Assessment/Plan   Diagnoses and all orders for this visit:    1. Left foot pain (Primary)  -     triamcinolone acetonide (KENALOG-40) injection 20 mg    2. Plantar fasciitis of left foot    Patient presents with prominent pain involving the plantar medial aspect of the left heel.  After evaluation, I explained that her symptoms are consistent with plantar fasciitis.  We " did discuss the diagnosis and treatment options.  Given her symptoms, I do feel that we should proceed with a steroid injection.  I reviewed the risks and benefits.  Procedure was performed without complication.  I have also asked that she continue wearing her inserts on a routine basis.  She is to proceed with stretching exercises.  Patient did require endoscopic plantar fasciotomy for definitive management of her right lower extremity.  She does understand that this may be required for her left.  I have asked that she return in 4 weeks for reevaluation.  Than 20 minutes was spent before, during, and after evaluation for patient care    Plantar Fascia Steroid Injection: Left    Consent and time out was performed before proceeding with the procedure.  The area of maximal tenderness was palpated near the plantar medial calcaneal tuberosity of left foot.  The area was cleansed with alcohol.  Under ultrasound guidance, the plantar fascia was visualized and a solution totaling 1.5 mL was injected about the origin of the plantar fascia.  The solution contained 0.5 mL of 1% lidocaine plain, 0.5 mL of 0.5% Marcaine plain, and 0.5 mL of Kenalog.  After the injection, the patient noted immediate pain relief.  Mild compression was placed at the injection site followed by a sterile bandage.  The patient tolerated the injection well without complication.      No orders of the defined types were placed in this encounter.         Note is dictated utilizing voice recognition software. Unfortunately this leads to occasional typographical errors. I apologize in advance if the situation occurs. If questions occur please do not hesitate to call our office.

## 2022-04-15 ENCOUNTER — OFFICE VISIT (OUTPATIENT)
Dept: FAMILY MEDICINE CLINIC | Facility: CLINIC | Age: 72
End: 2022-04-15

## 2022-04-15 VITALS
WEIGHT: 151.4 LBS | DIASTOLIC BLOOD PRESSURE: 78 MMHG | OXYGEN SATURATION: 100 % | SYSTOLIC BLOOD PRESSURE: 125 MMHG | HEIGHT: 63 IN | BODY MASS INDEX: 26.82 KG/M2 | HEART RATE: 71 BPM

## 2022-04-15 DIAGNOSIS — U07.1 PNEUMONIA DUE TO COVID-19 VIRUS: ICD-10-CM

## 2022-04-15 DIAGNOSIS — J12.82 PNEUMONIA DUE TO COVID-19 VIRUS: ICD-10-CM

## 2022-04-15 DIAGNOSIS — R53.82 CHRONIC FATIGUE: Primary | ICD-10-CM

## 2022-04-15 DIAGNOSIS — F51.01 PRIMARY INSOMNIA: ICD-10-CM

## 2022-04-15 PROCEDURE — 99213 OFFICE O/P EST LOW 20 MIN: CPT | Performed by: NURSE PRACTITIONER

## 2022-04-15 PROCEDURE — 82607 VITAMIN B-12: CPT | Performed by: NURSE PRACTITIONER

## 2022-04-15 PROCEDURE — 36415 COLL VENOUS BLD VENIPUNCTURE: CPT | Performed by: NURSE PRACTITIONER

## 2022-04-15 RX ORDER — BACLOFEN 10 MG/1
10 TABLET ORAL NIGHTLY
Qty: 90 TABLET | Refills: 2 | Status: SHIPPED | OUTPATIENT
Start: 2022-04-15 | End: 2022-11-29

## 2022-04-15 NOTE — ASSESSMENT & PLAN NOTE
1.  TSH, vitamin D and hemoglobin all normal within the last 6 weeks  2.  Check vitamin B12  3.  Patient does not show symptoms of sleep apnea  4.  If B12 level is normal, likely related to COVID-19 infection

## 2022-04-15 NOTE — PROGRESS NOTES
"Chief Complaint  Follow-up (4 week f/u)    Subjective          Krystyna Fabian presents to [facility]  History of Present Illness    Patient presents for f/u visit.     Patient previously c/o worsening indigestion and heartburn. Omeprazole was discontinued and Protonix started. She reports the Protonix did not provide any relief so she has restarted the omeprazole twice daily and symptoms are improved.    Patient previously had pneumonia.  Repeat chest x-ray shows resolution.  Patient denies chest pain, wheezing, cough or dyspnea.  She does not have to use inhalers.  Patient is c/o fatigue and having COVID-19. She reports she sleeps well, on Ambien 10mg and Melatonin. Patient reports she just cannot make it through the day without a nap.     Objective   Vital Signs:   /78 (BP Location: Left arm, Patient Position: Sitting, Cuff Size: Adult)   Pulse 71   Ht 160 cm (63\")   Wt 68.7 kg (151 lb 6.4 oz)   SpO2 100%   BMI 26.82 kg/m²            Physical Exam  Constitutional:       Appearance: Normal appearance.   HENT:      Head: Normocephalic.   Cardiovascular:      Rate and Rhythm: Normal rate and regular rhythm.   Pulmonary:      Effort: Pulmonary effort is normal.      Breath sounds: Normal breath sounds.   Abdominal:      General: Abdomen is flat. Bowel sounds are normal.      Palpations: Abdomen is soft.   Musculoskeletal:         General: Normal range of motion.      Cervical back: Neck supple.      Right lower leg: No edema.      Left lower leg: No edema.   Skin:     General: Skin is warm and dry.   Neurological:      Mental Status: She is alert and oriented to person, place, and time.      Gait: Gait is intact.   Psychiatric:         Attention and Perception: Attention normal.         Mood and Affect: Mood normal.         Speech: Speech normal.        Result Review :                 Assessment and Plan    Diagnoses and all orders for this visit:    1. Chronic fatigue " (Primary)  Assessment & Plan:  1.  TSH, vitamin D and hemoglobin all normal within the last 6 weeks  2.  Check vitamin B12  3.  Patient does not show symptoms of sleep apnea  4.  If B12 level is normal, likely related to COVID-19 infection    Orders:  -     Vitamin B12    2. Primary insomnia  Assessment & Plan:  1.  Continue Ambien 10 mg nightly      3. Pneumonia due to COVID-19 virus  Assessment & Plan:  1.  Resolved  2.  Call with any new concerns      Other orders  -     baclofen (LIORESAL) 10 MG tablet; Take 1 tablet by mouth Every Night.  Dispense: 90 tablet; Refill: 2    I spent 25 minutes caring for Krystyna on this date of service. This time includes time spent by me in the following activities:preparing for the visit, reviewing tests, obtaining and/or reviewing a separately obtained history, performing a medically appropriate examination and/or evaluation , counseling and educating the patient/family/caregiver, ordering medications, tests, or procedures, documenting information in the medical record, independently interpreting results and communicating that information with the patient/family/caregiver and care coordination  Follow Up   Return in about 3 months (around 7/15/2022).  Patient was given instructions and counseling regarding her condition or for health maintenance advice. Please see specific information pulled into the AVS if appropriate.

## 2022-04-16 LAB — VIT B12 BLD-MCNC: 267 PG/ML (ref 211–946)

## 2022-04-18 DIAGNOSIS — F51.01 PRIMARY INSOMNIA: ICD-10-CM

## 2022-04-18 NOTE — PROGRESS NOTES
B12 level is low end of normal at 267. I would recommend she take daily multivitamin that includes a B6 complex

## 2022-04-19 RX ORDER — QUETIAPINE FUMARATE 50 MG/1
TABLET, FILM COATED ORAL
Qty: 90 TABLET | Refills: 0 | Status: SHIPPED | OUTPATIENT
Start: 2022-04-19 | End: 2022-06-16

## 2022-04-19 RX ORDER — PANTOPRAZOLE SODIUM 40 MG/1
TABLET, DELAYED RELEASE ORAL
Qty: 90 TABLET | Refills: 0 | Status: SHIPPED | OUTPATIENT
Start: 2022-04-19 | End: 2022-06-14 | Stop reason: SDUPTHER

## 2022-05-11 DIAGNOSIS — F51.01 PRIMARY INSOMNIA: ICD-10-CM

## 2022-05-12 RX ORDER — ZOLPIDEM TARTRATE 10 MG/1
10 TABLET ORAL NIGHTLY PRN
Qty: 30 TABLET | Refills: 0 | Status: SHIPPED | OUTPATIENT
Start: 2022-05-12 | End: 2022-06-10

## 2022-05-26 ENCOUNTER — OFFICE VISIT (OUTPATIENT)
Dept: PODIATRY | Facility: CLINIC | Age: 72
End: 2022-05-26

## 2022-05-26 VITALS
OXYGEN SATURATION: 100 % | BODY MASS INDEX: 24.91 KG/M2 | HEART RATE: 76 BPM | DIASTOLIC BLOOD PRESSURE: 67 MMHG | WEIGHT: 140.6 LBS | SYSTOLIC BLOOD PRESSURE: 92 MMHG

## 2022-05-26 DIAGNOSIS — M79.672 LEFT FOOT PAIN: Primary | ICD-10-CM

## 2022-05-26 DIAGNOSIS — M72.2 PLANTAR FASCIITIS OF LEFT FOOT: ICD-10-CM

## 2022-05-26 PROCEDURE — 99213 OFFICE O/P EST LOW 20 MIN: CPT | Performed by: PODIATRIST

## 2022-05-26 NOTE — PROGRESS NOTES
05/26/2022  Foot and Ankle Surgery - Established Patient/Follow-up  Provider: Dr. Reginald Ro DPM  Location: [facility]    Subjective:  Krystyna Fabian is a 72 y.o. female.     Chief Complaint   Patient presents with   • Left Foot - Pain, Follow-up     Plantar fascitis- last pcp 4/15/2022       HPI: Krystyna Fabian is a 72-year-old female who presents to the office today for a follow-up of her left foot.     The patient received an injection in her left heel last month. She reports feeling worse. She states she only experienced minor relief for 1 to 2 weeks after the injection and the pain progressively worsened thereafter. She denies change in her activity level. The pain is localized to the plantar aspect of her left foot and she notes that she has also been experiencing numbness in her toes. The patient is wearing the inserts. She has been performing the exercises with the ice bottle at home. She denies ambulating while barefoot and denies wearing flip flops or sandals. She states that she exclusively wears her tennis shoes all day until she goes to bed. The patient rates the pain as an 8 out of 10. She has a walking boot.     The patient reports a history of surgery on her right foot approximately 2 years ago. She did well with this and has not had any issues since the procedure. The patient underwent 2 injections and physical therapy prior to undergoing surgery on her right foot.     The patient is going to [place] on 07/28/2022 and does not want to be in the boot when she goes to [place].       Allergies   Allergen Reactions   • Butorphanol Itching     stadol   • Erythromycin Other (See Comments)     CAUSES HYPERTENSION         Current Outpatient Medications on File Prior to Visit   Medication Sig Dispense Refill   • albuterol sulfate  (90 Base) MCG/ACT inhaler Inhale 2 puffs Every 4 (Four) Hours As Needed for Wheezing or Shortness of Air. 18 g 0   • Ascorbic Acid (VITAMIN C PO)  Take  by mouth.     • baclofen (LIORESAL) 10 MG tablet Take 1 tablet by mouth Every Night. 90 tablet 2   • docusate sodium 100 MG capsule Take 100 mg by mouth.     • montelukast (SINGULAIR) 10 MG tablet TAKE 1 TABLET BY MOUTH EVERY NIGHT 90 tablet 1   • Multiple Vitamins-Minerals (ZINC PO) Take  by mouth.     • pantoprazole (PROTONIX) 40 MG EC tablet TAKE 1 TABLET EVERY DAY 90 tablet 0   • potassium chloride ER (K-TAB) 20 MEQ tablet controlled-release ER tablet TAKE ONE TABLET BY MOUTH TWICE A DAY 60 tablet 2   • QUEtiapine (SEROquel) 50 MG tablet TAKE 1 TABLET AT BEDTIME 90 tablet 0   • terbinafine (LamISIL) 250 MG tablet Take 1 tablet by mouth Daily. 30 tablet 2   • VITAMIN D, CHOLECALCIFEROL, PO Take 1 tablet by mouth Daily.     • zolpidem (AMBIEN) 10 MG tablet TAKE 1 TABLET BY MOUTH AT NIGHT AS NEEDED FOR SLEEP 30 tablet 0     No current facility-administered medications on file prior to visit.       Objective   BP 92/67   Pulse 76   Wt 63.8 kg (140 lb 9.6 oz)   SpO2 100%   BMI 24.91 kg/m²     Foot/Ankle Exam:       General:   Appearance: appears stated age and healthy    Orientation: AAOx3    Affect: appropriate    Gait: antalgic      VASCULAR      Left Foot Vascularity   Normal vascular exam    Dorsalis pedis:  2+  Posterior tibial:  2+  Skin Temperature: warm    Edema Grading:  None  CFT:  < 3 seconds  Pedal Hair Growth:  Present  Varicosities: none        NEUROLOGIC     Left Foot Neurologic   Light touch sensation:  Normal  Hot/cold sensation: normal    Achilles reflex:  2+     MUSCULOSKELETAL      Left Foot Musculoskeletal   Ecchymosis:  None  Tenderness: plantar fascia and plantar heel    Arch:  Normal     MUSCLE STRENGTH     Left Foot Muscle Strength   Normal strength    Foot dorsiflexion:  5  Foot plantar flexion:  5  Foot inversion:  5  Foot eversion:  5     DERMATOLOGIC     Left Foot Dermatologic   Skin: skin intact        Left Foot Additional Comments: Discomfort with palpation involving the  plantar medial calcaneal tuberosity.  No gross deformity or instability.  Moderate equinus contracture with knee extended and flexed      Assessment & Plan   Diagnoses and all orders for this visit:    1. Left foot pain (Primary)    2. Plantar fasciitis of left foot        Krystyna Fabian is a 72-year-old female who presents to the office today for a follow-up of her left foot. She failed previous injection as she only experienced temporary relief of her pain following the injection and the pain has since gotten worse. We discussed the etiology and biomechanics involved with her pain and toe numbness. We discussed further options, including physical therapy. We discussed avoidance of ambulating while barefoot and wearing flip flops or sandals. I am going to prescribe a Medrol Dosepak today. I encouraged her to wear her walking boot for the next couple of weeks. We are going to refer the patient to physical therapy and she will attend physical therapy if she wishes to do so. We discussed possible surgical intervention should she fail all other treatment options. She has undergone surgery for the same on her right foot with good results. I explained that she would be nonweightbearing with crutches or a knee scooter for 2 weeks post-operatively and will be placed in a boot for 2 to 4 weeks thereafter. All questions and concerns were addressed.  Patient will consider her options and follow-up with me in approximately 6 weeks for reevaluation.  Greater than 20 minutes spent before, during, and after evaluation for patient care    No orders of the defined types were placed in this encounter.         Note is dictated utilizing voice recognition software. Unfortunately this leads to occasional typographical errors. I apologize in advance if the situation occurs. If questions occur please do not hesitate to call our office.    Transcribed from ambient dictation for GERRI Ro DPM by Jayshree Young.  05/26/22   10:23  EDT    Patient verbalized consent to the visit recording.

## 2022-05-31 ENCOUNTER — TELEPHONE (OUTPATIENT)
Dept: PODIATRY | Facility: CLINIC | Age: 72
End: 2022-05-31

## 2022-05-31 NOTE — TELEPHONE ENCOUNTER
Caller: Krystyna Fabian    Relationship: Self    Best call back number: [phone]    What orders are you requesting (i.e. lab or imaging): ORDER FOR PHYSICAL THERAPY FOR FOOT (DISCUSSED AT LAST APPT)    In what timeframe would the patient need to come in: SOON    Where will you receive your lab/imaging services: IF LOCATION ON [facility] IS STILL OPEN AND THEY ACCEPT PATIENTS INSURANCE- IF NOT THE OFFICE NEXT DOOR     Additional notes: PLEASE CALL AND ADVISE PATIENT ONCE SENT SO SHE MAY FOLLOW UP WITH SCHEDULING.TY!

## 2022-06-10 DIAGNOSIS — F51.01 PRIMARY INSOMNIA: ICD-10-CM

## 2022-06-10 RX ORDER — ZOLPIDEM TARTRATE 10 MG/1
10 TABLET ORAL NIGHTLY PRN
Qty: 30 TABLET | Refills: 0 | Status: SHIPPED | OUTPATIENT
Start: 2022-06-10 | End: 2022-07-07

## 2022-06-14 ENCOUNTER — TELEPHONE (OUTPATIENT)
Dept: FAMILY MEDICINE CLINIC | Facility: CLINIC | Age: 72
End: 2022-06-14

## 2022-06-14 RX ORDER — OMEPRAZOLE 20 MG/1
20 CAPSULE, DELAYED RELEASE ORAL DAILY
Qty: 90 CAPSULE | Refills: 0 | Status: SHIPPED | OUTPATIENT
Start: 2022-06-14 | End: 2022-06-22 | Stop reason: SDUPTHER

## 2022-06-14 RX ORDER — MONTELUKAST SODIUM 10 MG/1
10 TABLET ORAL NIGHTLY
Qty: 90 TABLET | Refills: 1 | Status: SHIPPED | OUTPATIENT
Start: 2022-06-14 | End: 2022-08-15

## 2022-06-14 RX ORDER — PANTOPRAZOLE SODIUM 40 MG/1
40 TABLET, DELAYED RELEASE ORAL DAILY
Qty: 90 TABLET | Refills: 0 | Status: SHIPPED | OUTPATIENT
Start: 2022-06-14 | End: 2022-06-14

## 2022-06-14 NOTE — TELEPHONE ENCOUNTER
Caller: FabianKrystyna    Relationship: Self    Best call back number: [phone]    What medication are you requesting: OMEPRAZOLE    If a prescription is needed, what is your preferred pharmacy and phone number: [facility] (NOW [facility]) - [place], [place] - [address] - [phone]  - [phone]      Additional notes: PATIENT IS WANTING TO DISCONTINUE THE pantoprazole (PROTONIX) 40 MG EC tablet AND WANTS TO BE PRESCRIBED THE OMEPRAZOLE INSTEAD. SHE HAS A WEEK LEFT OF THE OMEPRAZOLE.

## 2022-06-14 NOTE — TELEPHONE ENCOUNTER
Pt is not taking Omeprazole. It appears that she is taking Pantoprazole 40mg. Sent provider the correct medication request.

## 2022-06-14 NOTE — TELEPHONE ENCOUNTER
Caller: [facility] (NOW [facility]) - [place], [place] - [address] - [phone] [facility] [phone] FX    Relationship: Pharmacy    Best call back number:[phone]    What medication are you requesting: OMEPRAZOLE 40 MG     If a prescription is needed, what is your preferred pharmacy and phone number: [facility] (NOW [facility]) - [place], [place] - [address] - [phone] [facility] [phone] FX     Additional notes: PATIENT ASKING FOR MEDICATION TO BE FILLED

## 2022-06-16 DIAGNOSIS — F51.01 PRIMARY INSOMNIA: ICD-10-CM

## 2022-06-16 RX ORDER — QUETIAPINE FUMARATE 50 MG/1
TABLET, FILM COATED ORAL
Qty: 90 TABLET | Refills: 0 | Status: SHIPPED | OUTPATIENT
Start: 2022-06-16 | End: 2022-11-29

## 2022-06-22 RX ORDER — OMEPRAZOLE 20 MG/1
40 CAPSULE, DELAYED RELEASE ORAL DAILY
Qty: 180 CAPSULE | Refills: 0 | Status: SHIPPED | OUTPATIENT
Start: 2022-06-22 | End: 2022-07-25 | Stop reason: SDUPTHER

## 2022-06-22 NOTE — TELEPHONE ENCOUNTER
Caller: Krystyna Fabian    Relationship: Self    Best call back number: [phone]    Requested Prescriptions:   Requested Prescriptions     Pending Prescriptions Disp Refills   • omeprazole (priLOSEC) 20 MG capsule 90 capsule 0     Sig: Take 1 capsule by mouth Daily.        Pharmacy where request should be sent: [facility] (NOW [facility]) - [place] [id number] - [phone]  - [phone] FX     Additional details provided by patient: PATIENT HAS RECEIVED HER MEDICATION IN THE MAIL BUT IT WAS ORDERED FOR ONE CAPSULE A DAY, PATIENT STATED SHE NORMALLY TAKES TWO CAPSULES A DAY AND IS WANTING TO KNOW IF SHE CAN GET ANOTHER PRESCRIPTION CORRECTED TO TWO CAPSULES A DAY. PLEASE ADVISE.     Does the patient have less than a 3 day supply:  [] Yes  [x] No    Adrian Delgado Rep   06/22/22 14:07 EDT

## 2022-06-30 ENCOUNTER — TREATMENT (OUTPATIENT)
Dept: PHYSICAL THERAPY | Facility: CLINIC | Age: 72
End: 2022-06-30

## 2022-06-30 DIAGNOSIS — M72.2 PLANTAR FASCIITIS OF LEFT FOOT: Primary | ICD-10-CM

## 2022-06-30 PROCEDURE — 97140 MANUAL THERAPY 1/> REGIONS: CPT | Performed by: PHYSICAL THERAPIST

## 2022-06-30 PROCEDURE — 97110 THERAPEUTIC EXERCISES: CPT | Performed by: PHYSICAL THERAPIST

## 2022-06-30 PROCEDURE — 97161 PT EVAL LOW COMPLEX 20 MIN: CPT | Performed by: PHYSICAL THERAPIST

## 2022-06-30 NOTE — PROGRESS NOTES
Physical Therapy Initial Evaluation and Plan of Care    Patient: Krystyna Fabian   : 1950  Diagnosis/ICD-10 Code:  Plantar fasciitis of left foot [M72.2]  Referring practitioner: GERRI Ro DPM  Date of Initial Visit: 2022  Today's Date: 2022  Patient seen for 1 sessions         Visit Diagnoses:    ICD-10-CM ICD-9-CM   1. Plantar fasciitis of left foot  M72.2 728.71       Subjective Questionnaire: FAAM score: 25% ability    Subjective Evaluation    History of Present Illness  Mechanism of injury: Medical History and surgery reviewed in Epic.  Hx Sx R foot 2020 for Plantar fasciitis, Back fusion sx. autoimmune ds,..  C/o left foot pain for the last 3 months. Came on gradually and steadily got worse. Coming in wearing a came walker. Better than wearing a shoe but is still having severe pain even with her orthotics on, even in the boot. It never gets better even at night it keeps her up throbbing pain. Pain in bottom of heel radiating up to medial ankle, burning and stinging. Has been doing her foot stretch ex and using frozen water bottle like she did before and after her other R foot surgery. Stretches help a little very minor and very temporary. The last month or so she is also having 2 nd & 3 rd toe numbness and tightness all the time.   Interfering with her sleep, tossing and turning all night from back to sides.   Used to walk 45 daily, had to give that up 6 weeks ago due to the L foot pain.    Denies BP right now.   Going back to [facility] on  for another injection, first one did not help.   Last time with PT did have IASTYM also, it helped some temporary but got wore again after and did end up having surgery for R foot.       Subjective comment: 71 y/o w/f ref. with Plantar fasciitis left foot.  Patient Occupation: works 2 jobs sitting at a desk. Pain  Current pain ratin (sitting at rest not on foot: 7 to 8/10)  At best pain ratin  At worst pain rating: 10 (worst in  am before she puts her boot on, never gets a brake from it.  walking standing on it longer. )  Location: Left heel sharp stabbing pain when walking, throbbing pain at night.   Relieving factors: ice and rest (a little temporary relief after icing it.)  Aggravating factors: ambulation and standing (and first thing in am before stretching it. )  Progression: worsening (since onset 3 months ago )    Social Support  Lives in: one-story house (2 steps to go in )  Lives with: alone    Hand dominance: right    Treatments  Previous treatment: injection treatment (Foot injection did not help)  Patient Goals  Patient goals for therapy: decreased edema, decreased pain, improved balance, increased motion, increased strength, independence with ADLs/IADLs and return to sport/leisure activities  Patient goal: Get rid of foot pain.            Objective          Active Range of Motion   Left Ankle/Foot   Dorsiflexion (ke): 16 degrees   Plantar flexion: 48 degrees   Inversion: 30 degrees   Eversion: 30 degrees     Right Ankle/Foot   Dorsiflexion (ke): 20 degrees   Plantar flexion: 55 degrees   Inversion: 50 degrees   Eversion: 30 degrees   Great toe flexion: WFL  Great toe extension: WFL    Additional Active Range of Motion Details  Patient walked in with boot and higher gym shoe with orthotics in. Mild limp due to boot.  Normal standing, wants to shift off the left foot to unload.  Left foot normal look, R foot looks mild pronated. Left big toe more limited ROM than R probably due to being in boot for the last 6 weeks.  Back screen due to left 2 nd and 3 rd toes being numb.   Lumbar flexion stiff and no curve reversal, no effect on foot pain. Lumbar extension; stiff and LBP and worse left foot during, no worse after. Side glide R : loading L foot more hurt left foot more during, no worse after. SG  L: no effect on foot pain.  Did trial repeat lumbar flexion 2 x 10: no effect on foot pain, no change in middle toes feeling numb.  Patient is able to be prone for a short duration but can no longer sleep on her stomach since her spine fusion.  Started first session of IASTYM to left lower leg and foot. Lots of  Fibrosis in the medial leg, calf and medial heel and big toe. Did PROM foot stretches and reviewed patients stretches she is already doing and added lumbar flexion and prone knee bends to ex.  Patient educated to use ice on foot and leg and tylenol if hurting too much try to avoid NSAIDS after IASTYM.   Access Code: [email or web address]  URL: [email or web address]  Date: 06/30/2022  Prepared by: Katie Ying  Exercises  Long Sitting Calf Stretch with Strap - 3 x daily - 7 x weekly - 1 sets - 3 reps - 20 sec hold  Seated Self Great Toe Stretch - 3 x daily - 7 x weekly - 1 sets - 3 reps - 10 hold  Seated Anterior Tibialis Stretch - 3 x daily - 7 x weekly - 1 sets - 3 reps - 10sec hold  Gastroc Stretch with Foot at Wall - 1 x daily - 7 x weekly - 3 sets - 10 reps - 20 sec hold  Prone Knee Flexion AROM - 2 x daily - 7 x weekly - 1 sets - 10 reps  Supine Double Knee to Chest - 2 x daily - 7 x weekly - 1 sets - 10 reps            Assessment & Plan     Assessment  Impairments: abnormal or restricted ROM, activity intolerance, impaired balance, lacks appropriate home exercise program, pain with function and weight-bearing intolerance  Functional Limitations: walking, uncomfortable because of pain and standing  Assessment details: 73 y/o w/f with chronic left foot pain due to Plantar fasciitis. Causing pain all day and night, impaired gait and function. Had to stop her 45 min daily walk. Severe Fibrosis in medial left leg and calf and down to L big toe. Patient will benifit from skilled PT.    Barriers to therapy: Hx Sx R foot 11/9/2020 for Plantar fasciitis, Back fusion sx. autoimmune ds,..  Prognosis: good    Goals  Plan Goals: Plan goals: Pt presents to PT with symptoms consistent with Left foot pain. Pt would benefit from skilled PT intervention  to address the deficits noted.    SHORT TERM GOALS: time for goal achievement: 3 weeks  1. Patient to be compliant with initial HEP.  2. Pt able to resume walk longer 30 min with foot pain  , 5 /10.  3. Pt can tolerate 10 min warmup on Nustep or Bike full revolutions without increasing L foot pain.  4. Patient can tolerate 30 reps Red TB 4 way ankle strength ex.    LONG TERM GOALS; Time for goal achievement: 12 visits  1. Pt to have significant improvement on perceived disability score/outcome measure.  2. Patient able to be on feet all day with L foot/heel pain , 2/10.   3. Pt to demonstrate increased stability of the ankle/foot to balance on foam as needed to traverse uneven terrain.  4. Pt reports she is ready for DC to [facility] for self management of  her condition.     Plan  Therapy options: will be seen for skilled therapy services  Planned modality interventions: cryotherapy, electrical stimulation/Russian stimulation, TENS, thermotherapy (hydrocollator packs) and ultrasound  Planned therapy interventions: abdominal trunk stabilization, body mechanics training, functional ROM exercises, gait training, home exercise program, joint mobilization, manual therapy, neuromuscular re-education, postural training, prosthetic fitting/training, soft tissue mobilization, strengthening, stretching and therapeutic activities  Frequency: 1 to 2 times a week.  Duration in visits: 12  Duration in weeks: 10  Treatment plan discussed with: patient        History # of Personal Factors and/or Comorbidities: MODERATE (1-2)  Examination of Body System(s): # of elements: LOW (1-2)  Clinical Presentation: EVOLVING  Clinical Decision Making: LOW     Timed:         Manual Therapy:    10     mins  99091;     Therapeutic Exercise:    15     mins  86800;     Neuromuscular Joaquín:        mins  41136;    Therapeutic Activity:          mins  37382;     Gait Training:           mins  23371;     Ultrasound:          mins  09939;    Ionto                                    mins   73067  Self Care                            mins   02312  Aquatic                               mins   72284        Un-Timed:  Electrical Stimulation:         mins  34926 ( );  Dry Needling          mins self-pay  Traction          mins 44904  Low Eval     35     Mins  98180  Mod Eval          Mins  29145  High Eval                            Mins  40244  Re-Eval                               mins  92821        Timed Treatment:   25   mins   Total Treatment:     60   mins    PT SIGNATURE: Katie Ying PT   [place] License #: [id number]      Initial Certification  Certification Period: 9/27/2022  I certify that the therapy services are furnished while this patient is under my care.  The services outlined above are required by this patient, and will be reviewed every 90 days.    Physician Signature: ___________________________________________________________    PHYSICIAN: GERRI Ro DPM      DATE:     Please sign and return via fax to [phone].. Thank you, [facility].

## 2022-07-06 ENCOUNTER — TREATMENT (OUTPATIENT)
Dept: PHYSICAL THERAPY | Facility: CLINIC | Age: 72
End: 2022-07-06

## 2022-07-06 DIAGNOSIS — M72.2 PLANTAR FASCIITIS OF LEFT FOOT: Primary | ICD-10-CM

## 2022-07-06 PROCEDURE — 97110 THERAPEUTIC EXERCISES: CPT | Performed by: PHYSICAL THERAPIST

## 2022-07-06 PROCEDURE — 97140 MANUAL THERAPY 1/> REGIONS: CPT | Performed by: PHYSICAL THERAPIST

## 2022-07-06 NOTE — PROGRESS NOTES
Physical Therapy Daily Treatment Note  VISIT#: 2 POC 12      Patient: Krystyna Fabian  : 1950  Referring practitioner: GERRI Ro DPM  Date of Initial Visit: Type: THERAPY  Noted: 2022  Today's Date: 2022  Patient seen for 2 sessions      Visit Diagnosis:    ICD-10-CM ICD-9-CM   1. Plantar fasciitis of left foot  M72.2 728.71       Subjective   Came in with 8/10 left lateral and medial heel foot pain.    Objective   See Exercise, Manual, and Modality Logs for complete treatment.     Patient Education:  Continued with IASTYM followed with ex.   Access Code: [email or web address]  URL: [email or web address]  Date: 2022  Prepared by: Katie Ying    Exercises  Long Sitting Ankle Eversion with Resistance - 1 x daily - 7 x weekly - 10 reps - 3 sets - 5 hold  Long Sitting Ankle Inversion with Resistance - 1 x daily - 7 x weekly - 10 reps - 3 sets - 5 hold  Long Sitting Ankle Plantar Flexion with Resistance - 1 x daily - 7 x weekly - 10 reps - 3 sets - 5 hold  Long Sitting Ankle Dorsiflexion with Anchored Resistance - 1 x daily - 7 x weekly - 10 reps - 3 sets - 5 hold      Assessment/Plan  Lots of fibrosis in Left medial lower leg, some big big toe. Very tender during IASTYM.   Added 4 way ankle strength to HEP. Reviewed foot stretches after IASTYM.  Advised patient to bring other shoe to start warm up on Nustep and try some walking.    Progress per Plan of Care and Progress strengthening /stabilization /functional activity  As able to tolerate          Timed:         Manual Therapy:    10     mins  22745; (added to manual logs,22 )    Therapeutic Exercise:    30     mins  61377;     Neuromuscular Joaquín:        mins  72241;    Therapeutic Activity:          mins  65162;     Gait Training:           mins  48082;     Ultrasound:          mins  09788;    Ionto                                   mins   31209  Self Care                            mins   13938  [facility]                    mins  4209  Aquatic                               mins 68614    Un-Timed:  Electrical Stimulation:         mins  84475 ( );  Dry Needling          mins self-pay  Traction          mins 09706    Timed Treatment:   40   mins   Total Treatment:     40   mins    Katie Ying PT  [place] License # [id number]  Physical Therapist

## 2022-07-07 ENCOUNTER — OFFICE VISIT (OUTPATIENT)
Dept: PODIATRY | Facility: CLINIC | Age: 72
End: 2022-07-07

## 2022-07-07 VITALS
BODY MASS INDEX: 24.91 KG/M2 | WEIGHT: 140.6 LBS | HEART RATE: 71 BPM | SYSTOLIC BLOOD PRESSURE: 106 MMHG | DIASTOLIC BLOOD PRESSURE: 70 MMHG | HEIGHT: 63 IN

## 2022-07-07 DIAGNOSIS — M72.2 PLANTAR FASCIITIS OF LEFT FOOT: ICD-10-CM

## 2022-07-07 DIAGNOSIS — M79.672 LEFT FOOT PAIN: Primary | ICD-10-CM

## 2022-07-07 DIAGNOSIS — F51.01 PRIMARY INSOMNIA: ICD-10-CM

## 2022-07-07 PROCEDURE — 20550 NJX 1 TENDON SHEATH/LIGAMENT: CPT | Performed by: PODIATRIST

## 2022-07-07 PROCEDURE — 99213 OFFICE O/P EST LOW 20 MIN: CPT | Performed by: PODIATRIST

## 2022-07-07 RX ORDER — ZOLPIDEM TARTRATE 10 MG/1
10 TABLET ORAL NIGHTLY PRN
Qty: 30 TABLET | Refills: 0 | Status: SHIPPED | OUTPATIENT
Start: 2022-07-07 | End: 2022-08-04

## 2022-07-07 RX ORDER — TRIAMCINOLONE ACETONIDE 40 MG/ML
20 INJECTION, SUSPENSION INTRA-ARTICULAR; INTRAMUSCULAR ONCE
Status: COMPLETED | OUTPATIENT
Start: 2022-07-07 | End: 2022-07-07

## 2022-07-07 RX ADMIN — TRIAMCINOLONE ACETONIDE 20 MG: 40 INJECTION, SUSPENSION INTRA-ARTICULAR; INTRAMUSCULAR at 08:45

## 2022-07-07 NOTE — TELEPHONE ENCOUNTER
Rx Refill Note  Requested Prescriptions     Pending Prescriptions Disp Refills   • zolpidem (AMBIEN) 10 MG tablet [Pharmacy Med Name: ZOLPIDEM 10MG TABLETS] 30 tablet      Sig: TAKE 1 TABLET BY MOUTH AT NIGHT AS NEEDED FOR SLEEP      Last office visit with prescribing clinician: 4/15/2022      Next office visit with prescribing clinician: 7/15/2022            wEa Dukes MA  07/07/22, 09:14 EDT

## 2022-07-07 NOTE — PROGRESS NOTES
07/07/2022  Foot and Ankle Surgery - Established Patient/Follow-up  Provider: Dr. Reginald Ro, DAJUAN  Location: [facility]    Subjective:  Krystyna Fabian is a 72 y.o. female.     Chief Complaint   Patient presents with   • Left Foot - Follow-up, Pain     OBED Clementson APRN 4/15/2022       HPI: Krystyna Fabian is a 72-year-old female who presents to the office today for a follow-up regarding her left foot pain.     The patient states she has worn the boot daily since her last appointment. She attended her first physical therapy session on 07/06/2022. She notes that it took the physical therapy facility 6 weeks to get her an appointment. She states that the physical therapist believes the boot is affecting her back and she was provided with exercises for her back to perform at home. The patient reports that the physical therapist noticed a knot on her left foot and inquired if she had undergone x-ray to rule out a bone spur.     The patient is going to [place], [place] to visit her daughter on 07/29/2022. She does not believe she will ambulate excessively.     Allergies   Allergen Reactions   • Butorphanol Itching     stadol   • Erythromycin Other (See Comments)     CAUSES HYPERTENSION         Current Outpatient Medications on File Prior to Visit   Medication Sig Dispense Refill   • Ascorbic Acid (VITAMIN C PO) Take  by mouth.     • baclofen (LIORESAL) 10 MG tablet Take 1 tablet by mouth Every Night. 90 tablet 2   • docusate sodium 100 MG capsule Take 100 mg by mouth.     • montelukast (SINGULAIR) 10 MG tablet Take 1 tablet by mouth Every Night. 90 tablet 1   • Multiple Vitamins-Minerals (ZINC PO) Take  by mouth.     • omeprazole (priLOSEC) 20 MG capsule Take 2 capsules by mouth Daily for 90 days. 180 capsule 0   • potassium chloride ER (K-TAB) 20 MEQ tablet controlled-release ER tablet TAKE ONE TABLET BY MOUTH TWICE A DAY 60 tablet 2   • QUEtiapine (SEROquel) 50 MG tablet TAKE 1 TABLET AT BEDTIME  "90 tablet 0   • VITAMIN D, CHOLECALCIFEROL, PO Take 1 tablet by mouth Daily.       No current facility-administered medications on file prior to visit.       Objective   /70   Pulse 71   Ht 160 cm (63\")   Wt 63.8 kg (140 lb 9.6 oz)   BMI 24.91 kg/m²     Foot/Ankle Exam:       General:   Appearance: appears stated age and healthy    Orientation: AAOx3    Affect: appropriate    Gait: antalgic      VASCULAR      Left Foot Vascularity   Normal vascular exam    Dorsalis pedis:  2+  Posterior tibial:  2+  Skin Temperature: warm    Edema Grading:  None  CFT:  < 3 seconds  Pedal Hair Growth:  Present  Varicosities: none        NEUROLOGIC     Left Foot Neurologic   Light touch sensation:  Normal  Hot/cold sensation: normal    Achilles reflex:  2+     MUSCULOSKELETAL      Left Foot Musculoskeletal   Ecchymosis:  None  Tenderness: plantar fascia and plantar heel    Arch:  Normal     MUSCLE STRENGTH     Left Foot Muscle Strength   Normal strength    Foot dorsiflexion:  5  Foot plantar flexion:  5  Foot inversion:  5  Foot eversion:  5     DERMATOLOGIC     Left Foot Dermatologic   Skin: skin intact        Left Foot Additional Comments: Discomfort with palpation involving the plantar medial calcaneal tuberosity.  No gross deformity or instability.  Moderate equinus contracture with knee extended and flexed      Assessment & Plan   Diagnoses and all orders for this visit:    1. Left foot pain (Primary)    2. Plantar fasciitis of left foot  -     Injection Tendon or Ligament  -     triamcinolone acetonide (KENALOG-40) injection 20 mg      The patient presents to the office today with issues involving her left foot. She is wearing the boot and has been doing so on a daily basis since her last appointment. We discussed possible PRP injections given that she has failed steroid injection in the past. We also discussed surgical intervention with endoscopic plantar fasciotomy. I explained that she would be non-weightbearing for " 2 weeks postoperatively and will wear a boot for 2 to 4 weeks thereafter. We discussed risks of wearing the boot long-term due to concerns of developing secondary issues. I explained that the knot is comprised of scar tissue. The patient would like to move forward with steroid injection today given her upcoming trip to  [place], [place] on 07/29/2022. We discussed avoidance of excessive activity following the steroid injection. I recommend that she wear the boot for the next couple of days and attempt to transition into normal shoe gear thereafter. She will attend physical therapy as scheduled and will return to the office in 4 weeks. Left foot plantar fascia injection was performed today and the patient tolerated the injection well. Greater than 20 minutes was spent before, during, and after evaluation for patient care.     Orders Placed This Encounter   Procedures   • Injection Tendon or Ligament     Order Specific Question:   Release to patient     Answer:   Immediate     Plantar Fascia Steroid Injection: Left foot    Consent and time out was performed before proceeding with the procedure.  The area of maximal tenderness was palpated near the plantar medial calcaneal tuberosity of left foot.  The area was cleansed with alcohol.  Under ultrasound guidance, the plantar fascia was visualized and a solution totaling 1.5 mL was injected about the origin of the plantar fascia.  The solution contained 0.5 mL of 1% lidocaine plain, 0.5 mL of 0.5% Marcaine plain, and 0.5 mL of Kenalog.  After the injection, the patient noted immediate pain relief.  Mild compression was placed at the injection site followed by a sterile bandage.  The patient tolerated the injection well without complication.           Note is dictated utilizing voice recognition software. Unfortunately this leads to occasional typographical errors. I apologize in advance if the situation occurs. If questions occur please do not hesitate to call our  office.    Transcribed from ambient dictation for GERRI Ro DPM by Jayshree Young.  07/07/22   09:51 EDT    Patient verbalized consent to the visit recording.  I have personally performed the services described in this document as transcribed by the above individual, and it is both accurate and complete.  GERRI Ro DPM  7/8/2022  07:30 EDT

## 2022-07-13 ENCOUNTER — TREATMENT (OUTPATIENT)
Dept: PHYSICAL THERAPY | Facility: CLINIC | Age: 72
End: 2022-07-13

## 2022-07-13 DIAGNOSIS — M72.2 PLANTAR FASCIITIS OF LEFT FOOT: Primary | ICD-10-CM

## 2022-07-13 PROCEDURE — 97110 THERAPEUTIC EXERCISES: CPT | Performed by: PHYSICAL THERAPIST

## 2022-07-13 PROCEDURE — 97112 NEUROMUSCULAR REEDUCATION: CPT | Performed by: PHYSICAL THERAPIST

## 2022-07-13 PROCEDURE — 97140 MANUAL THERAPY 1/> REGIONS: CPT | Performed by: PHYSICAL THERAPIST

## 2022-07-13 NOTE — PROGRESS NOTES
Physical Therapy Daily Treatment Note  VISIT#: 3 POC 12      Patient: Krystyna Fabian  : 1950  Referring practitioner: GERRI Ro DPM  Date of Initial Visit: Type: THERAPY  Noted: 2022  Today's Date: 2022  Patient seen for 3 sessions      Visit Diagnosis:    ICD-10-CM ICD-9-CM   1. Plantar fasciitis of left foot  M72.2 728.71       Subjective     Came in with 6/10 left lateral and medial heel foot pain. Came in in her Gym shoes. She stopped wearing her boot on Saturday.    Objective     See Exercise, Manual, and Modality Logs for complete treatment.     Patient Education:  Continued with IASTYM followed with ex.   Added Aerex balance to ex routine.      Assessment/Plan    A little less fibrosis in Left medial lower leg, still some L big toe. Very tender during IASTYM where she got her injectio, still little bruise.  Practiced 4 way ankle strength to HEP. Reviewed foot stretches after IASTYM.    SHORT TERM GOALS: time for goal achievement: 3 weeks  1. Patient to be compliant with initial HEP-  met  2. Pt able to resume walk longer 30 min with foot pain  , 5 /10 - making progress  3. Pt can tolerate 10 min warmup on Nustep or Bike full revolutions without increasing L foot pain- making progress  4. Patient can tolerate 30 reps Red TB 4 way ankle strength ex. - met with green    LONG TERM GOALS; Time for goal achievement: 12 visits  1. Pt to have significant improvement on perceived disability score/outcome measure.  2. Patient able to be on feet all day with L foot/heel pain , 2/10.   3. Pt to demonstrate increased stability of the ankle/foot to balance on foam as needed to traverse uneven terrain - making progress  4. Pt reports she is ready for DC to [facility] for self management of  her condition.     Progress per Plan of Care and Progress strengthening /stabilization /functional activity  As able to tolerate          Timed:         Manual Therapy:    12     mins  41487;      Therapeutic Exercise:    22     mins  69799;     Neuromuscular Joaquín:      10  mins  99525;    Therapeutic Activity:          mins  68995;     Gait Training:           mins  30240;     Ultrasound:          mins  68035;    Ionto                                   mins   92079  Self Care                            mins   15721  [employer]al[employer]                   mins  4209  Aquatic                               mins 59468    Un-Timed:  Electrical Stimulation:         mins  85627 ( );  Dry Needling          mins self-pay  Traction          mins 82912    Timed Treatment:   44   mins   Total Treatment:     44   mins    Katie Ying PT  [place] License # [id number]  Physical Therapist

## 2022-07-15 ENCOUNTER — TREATMENT (OUTPATIENT)
Dept: PHYSICAL THERAPY | Facility: CLINIC | Age: 72
End: 2022-07-15

## 2022-07-15 DIAGNOSIS — M72.2 PLANTAR FASCIITIS OF LEFT FOOT: Primary | ICD-10-CM

## 2022-07-15 PROCEDURE — 97140 MANUAL THERAPY 1/> REGIONS: CPT | Performed by: PHYSICAL THERAPIST

## 2022-07-15 PROCEDURE — 97110 THERAPEUTIC EXERCISES: CPT | Performed by: PHYSICAL THERAPIST

## 2022-07-15 PROCEDURE — 97530 THERAPEUTIC ACTIVITIES: CPT | Performed by: PHYSICAL THERAPIST

## 2022-07-15 NOTE — PROGRESS NOTES
Physical Therapy Daily Treatment Note  VISIT#: 4 POC 12      Patient: Krystyna Fabian  : 1950  Referring practitioner: GERRI Ro DPM  Date of Initial Visit: Type: THERAPY  Noted: 2022  Today's Date: 7/15/2022  Patient seen for 4 sessions      Visit Diagnosis:    ICD-10-CM ICD-9-CM   1. Plantar fasciitis of left foot  M72.2 728.71       Subjective     Came in with 6/10 left lateral and medial heel foot pain. Came in in her Gym shoes.     Objective     See Exercise, Manual, and Modality Logs for complete treatment.     Patient Education: review HEP  Warm up on Nustep: had to stop after 6 min due to stinging burning starting in foot.  Continued with IASTYM followed with ex.   Added 4 way hip to ex routine.  Access Code: [email or web address]  URL: [email or web address]  Date: 07/15/2022  Prepared by: Katie Ying    Exercises  Standing Hip Flexion with Anchored Resistance and Chair Support - 1 x daily - 7 x weekly - 10 reps - 2 sets  Standing Hip Extension with Anchored Resistance - 1 x daily - 7 x weekly - 10 reps - 2 sets  Standing Hip Adduction with Anchored Resistance - 1 x daily - 7 x weekly - 10 reps - 2 sets  Standing Hip Abduction with Anchored Resistance - 1 x daily - 7 x weekly - 10 reps - 2 sets      Assessment/Plan    A little less fibrosis in Left medial lower leg, still some L big toe. Less tender during IASTYM where she got her injectio, still little bruise.  Practiced 4 way ankle and hip strength to HEP. Reviewed foot stretches after IASTYM.  Left with same pain.    SHORT TERM GOALS: time for goal achievement: 3 weeks  1. Patient to be compliant with initial HEP-  met  2. Pt able to resume walk longer 30 min with foot pain  , 5 /10 - making progress  3. Pt can tolerate 10 min warmup on Nustep or Bike full revolutions without increasing L foot pain- making progress  4. Patient can tolerate 30 reps Red TB 4 way ankle strength ex. - met: with green    LONG TERM GOALS; Time for goal  achievement: 12 visits  1. Pt to have significant improvement on perceived disability score/outcome measure.  2. Patient able to be on feet all day with L foot/heel pain , 2/10.   3. Pt to demonstrate increased stability of the ankle/foot to balance on foam as needed to traverse uneven terrain - making progress  4. Pt reports she is ready for DC to [facility] for self management of  her condition.     Progress per Plan of Care and Progress strengthening /stabilization /functional activity  As able to tolerate          Timed:         Manual Therapy:    12     mins  77571;     Therapeutic Exercise:    20     mins  82438;     Neuromuscular Joaquín:        mins  92715;    Therapeutic Activity:   13       mins  86764;     Gait Training:           mins  29843;     Ultrasound:          mins  32827;    Ionto                                   mins   44313  Self Care                            mins   42518  Canalith Repos                   mins  4209  Aquatic                               mins 20772    Un-Timed:  Electrical Stimulation:         mins  23304 ( );  Dry Needling          mins self-pay  Traction          mins 52790    Timed Treatment:   45   mins   Total Treatment:     45   mins    Katie Ying, PT  [place] License # [id number]  Physical Therapist

## 2022-07-18 ENCOUNTER — TREATMENT (OUTPATIENT)
Dept: PHYSICAL THERAPY | Facility: CLINIC | Age: 72
End: 2022-07-18

## 2022-07-18 DIAGNOSIS — M72.2 PLANTAR FASCIITIS OF LEFT FOOT: Primary | ICD-10-CM

## 2022-07-18 PROCEDURE — 97530 THERAPEUTIC ACTIVITIES: CPT | Performed by: PHYSICAL THERAPIST

## 2022-07-18 PROCEDURE — 97110 THERAPEUTIC EXERCISES: CPT | Performed by: PHYSICAL THERAPIST

## 2022-07-18 PROCEDURE — 97140 MANUAL THERAPY 1/> REGIONS: CPT | Performed by: PHYSICAL THERAPIST

## 2022-07-18 NOTE — PROGRESS NOTES
Physical Therapy Daily Treatment Note  VISIT#: 5 POC 12      Patient: Krystyna Fabian  : 1950  Referring practitioner: GERRI Ro DPM  Date of Initial Visit: Type: THERAPY  Noted: 2022  Today's Date: 2022  Patient seen for 5 sessions      Visit Diagnosis:    ICD-10-CM ICD-9-CM   1. Plantar fasciitis of left foot  M72.2 728.71       Subjective     Patient came in with 4 to 5/10 left lateral and medial heel foot pain.     Objective     See Exercise, Manual, and Modality Logs for complete treatment.     Patient Education: reviewed HEP  Warm up on Nustep: was able to finish 10 min today.  Continued with IASTYM followed with ex.   Practiced 4 way hip and balancing on Aerex.    Assessment/Plan    A little less fibrosis in Left medial lower leg, still some L big toe.  during IASTYM where she got her injection, still little bruise.  Practiced 4 way ankle and hip strength. Reviewed foot stretches after IASTYM.  Left with same level of pain.    SHORT TERM GOALS: time for goal achievement: 3 weeks  1. Patient to be compliant with initial HEP-  met  2. Pt able to resume walk longer 30 min with foot pain  , 5 /10 - making progress  3. Pt can tolerate 10 min warmup on Nustep or Bike full revolutions without increasing L foot pain- met  4. Patient can tolerate 30 reps Red TB 4 way ankle strength ex. - met: with green    LONG TERM GOALS; Time for goal achievement: 12 visits  1. Pt to have significant improvement on perceived disability score/outcome measure.  2. Patient able to be on feet all day with L foot/heel pain , 2/10.   3. Pt to demonstrate increased stability of the ankle/foot to balance on foam as needed to traverse uneven terrain - making progress  4. Pt reports she is ready for [facility] to [facility] of  her condition.     Progress per Plan of Care and Progress strengthening /stabilization /functional activity  As able to tolerate          Timed:         Manual  Therapy:    12     mins  14094;     Therapeutic Exercise:    18    mins  95082;     Neuromuscular Joaquín:        mins  86195;    Therapeutic Activity:   15     mins  78082;     Gait Training:           mins  52192;     Ultrasound:          mins  61218;    Ionto                                   mins   26061  Self Care                            mins   22147  Canalith Repos                   mins  4209  Aquatic                               mins 43408    Un-Timed:  Electrical Stimulation:         mins  35166 ( );  Dry Needling          mins self-pay  Traction          mins 58101    Timed Treatment:   45   mins   Total Treatment:     45   mins    Katie Ying PT  [place] License # [id number]  Physical Therapist

## 2022-07-20 ENCOUNTER — TREATMENT (OUTPATIENT)
Dept: PHYSICAL THERAPY | Facility: CLINIC | Age: 72
End: 2022-07-20

## 2022-07-20 DIAGNOSIS — M72.2 PLANTAR FASCIITIS OF LEFT FOOT: Primary | ICD-10-CM

## 2022-07-20 PROCEDURE — 97110 THERAPEUTIC EXERCISES: CPT | Performed by: PHYSICAL THERAPIST

## 2022-07-20 PROCEDURE — 97530 THERAPEUTIC ACTIVITIES: CPT | Performed by: PHYSICAL THERAPIST

## 2022-07-20 PROCEDURE — 97140 MANUAL THERAPY 1/> REGIONS: CPT | Performed by: PHYSICAL THERAPIST

## 2022-07-20 NOTE — PROGRESS NOTES
Physical Therapy Daily Treatment Note  VISIT#: 6 POC 12      Patient: Krystyna Fabian  : 1950  Referring practitioner: GERRI Ro DPM  Date of Initial Visit: Type: THERAPY  Noted: 2022  Today's Date: 2022  Patient seen for 6 sessions      Visit Diagnosis:    ICD-10-CM ICD-9-CM   1. Plantar fasciitis of left foot  M72.2 728.71       Subjective     Patient came in with same 4 to 5/10 left lateral and medial heel foot pain.   Patient is taking Tumeric for her Fibromyalgia.    Objective     See Exercise, Manual, and Modality Logs for complete treatment.     Patient Education: reviewed HEP  Warm up on Nustep: was able to finish 10 min today.  Continued with IASTYM followed with ex.   Practiced 4 way hip up to 20 reps and balancing on Aerex.    Assessment/Plan    Less fibrosis in Left medial lower leg, minimal in L big toe.    during IASTYM where she got her injection, still little bruise.  Left with same level of pain.    SHORT TERM GOALS: time for goal achievement: 3 weeks  1. Patient to be compliant with initial HEP-  met  2. Pt able to resume walk longer 30 min with foot pain  , 5 /10 - making progress  3. Pt can tolerate 10 min warmup on Nustep or Bike full revolutions without increasing L foot pain- met  4. Patient can tolerate 30 reps Red TB 4 way ankle strength ex. - met: with green    LONG TERM GOALS; Time for goal achievement: 12 visits  1. Pt to have significant improvement on perceived disability score/outcome measure.  2. Patient able to be on feet all day with L foot/heel pain , 2/10.   3. Pt to demonstrate increased stability of the ankle/foot to balance on foam as needed to traverse uneven terrain - met  4. Pt reports she is ready for DC to [facility] for self management of  her condition.     Progress per Plan of Care and Progress strengthening /stabilization /functional activity  As able to tolerate          Timed:         Manual Therapy:    12     mins   78774;     Therapeutic Exercise:    18    mins  42267;     Neuromuscular Joaquín:        mins  11742;    Therapeutic Activity:   17     mins  05360;     Gait Training:           mins  73234;     Ultrasound:          mins  29810;    Ionto                                   mins   05020  Self Care                            mins   01094  Canalith Repos                   mins  4209  Aquatic                               mins 24115    Un-Timed:  Electrical Stimulation:         mins  37188 ( );  Dry Needling          mins self-pay  Traction          mins 73748    Timed Treatment:   47   mins   Total Treatment:     47   mins    Katie Ying PT  [place] License # [id number]  Physical Therapist

## 2022-07-25 ENCOUNTER — TREATMENT (OUTPATIENT)
Dept: PHYSICAL THERAPY | Facility: CLINIC | Age: 72
End: 2022-07-25

## 2022-07-25 ENCOUNTER — OFFICE VISIT (OUTPATIENT)
Dept: FAMILY MEDICINE CLINIC | Facility: CLINIC | Age: 72
End: 2022-07-25

## 2022-07-25 VITALS
WEIGHT: 126 LBS | HEART RATE: 69 BPM | SYSTOLIC BLOOD PRESSURE: 128 MMHG | OXYGEN SATURATION: 99 % | BODY MASS INDEX: 22.32 KG/M2 | DIASTOLIC BLOOD PRESSURE: 80 MMHG | HEIGHT: 63 IN

## 2022-07-25 DIAGNOSIS — M72.2 PLANTAR FASCIITIS OF LEFT FOOT: Primary | ICD-10-CM

## 2022-07-25 DIAGNOSIS — R53.82 CHRONIC FATIGUE: ICD-10-CM

## 2022-07-25 DIAGNOSIS — K21.9 GASTROESOPHAGEAL REFLUX DISEASE WITHOUT ESOPHAGITIS: ICD-10-CM

## 2022-07-25 DIAGNOSIS — E55.9 VITAMIN D DEFICIENCY: ICD-10-CM

## 2022-07-25 DIAGNOSIS — Z00.00 PREVENTATIVE HEALTH CARE: ICD-10-CM

## 2022-07-25 DIAGNOSIS — F51.01 PRIMARY INSOMNIA: Primary | ICD-10-CM

## 2022-07-25 LAB
BASOPHILS # BLD AUTO: 0.04 10*3/MM3 (ref 0–0.2)
BASOPHILS NFR BLD AUTO: 0.4 % (ref 0–1.5)
DEPRECATED RDW RBC AUTO: 44.7 FL (ref 37–54)
EOSINOPHIL # BLD AUTO: 0.07 10*3/MM3 (ref 0–0.4)
EOSINOPHIL NFR BLD AUTO: 0.7 % (ref 0.3–6.2)
ERYTHROCYTE [DISTWIDTH] IN BLOOD BY AUTOMATED COUNT: 13.8 % (ref 12.3–15.4)
HCT VFR BLD AUTO: 48.6 % (ref 34–46.6)
HGB BLD-MCNC: 16.4 G/DL (ref 12–15.9)
IMM GRANULOCYTES # BLD AUTO: 0.03 10*3/MM3 (ref 0–0.05)
IMM GRANULOCYTES NFR BLD AUTO: 0.3 % (ref 0–0.5)
LYMPHOCYTES # BLD AUTO: 3.12 10*3/MM3 (ref 0.7–3.1)
LYMPHOCYTES NFR BLD AUTO: 32.8 % (ref 19.6–45.3)
MCH RBC QN AUTO: 30 PG (ref 26.6–33)
MCHC RBC AUTO-ENTMCNC: 33.7 G/DL (ref 31.5–35.7)
MCV RBC AUTO: 88.8 FL (ref 79–97)
MONOCYTES # BLD AUTO: 0.91 10*3/MM3 (ref 0.1–0.9)
MONOCYTES NFR BLD AUTO: 9.6 % (ref 5–12)
NEUTROPHILS NFR BLD AUTO: 5.35 10*3/MM3 (ref 1.7–7)
NEUTROPHILS NFR BLD AUTO: 56.2 % (ref 42.7–76)
NRBC BLD AUTO-RTO: 0 /100 WBC (ref 0–0.2)
PLATELET # BLD AUTO: 313 10*3/MM3 (ref 140–450)
PMV BLD AUTO: 12.1 FL (ref 6–12)
RBC # BLD AUTO: 5.47 10*6/MM3 (ref 3.77–5.28)
WBC NRBC COR # BLD: 9.52 10*3/MM3 (ref 3.4–10.8)

## 2022-07-25 PROCEDURE — 97140 MANUAL THERAPY 1/> REGIONS: CPT | Performed by: PHYSICAL THERAPIST

## 2022-07-25 PROCEDURE — 99213 OFFICE O/P EST LOW 20 MIN: CPT | Performed by: NURSE PRACTITIONER

## 2022-07-25 PROCEDURE — 85025 COMPLETE CBC W/AUTO DIFF WBC: CPT | Performed by: NURSE PRACTITIONER

## 2022-07-25 PROCEDURE — 82306 VITAMIN D 25 HYDROXY: CPT | Performed by: NURSE PRACTITIONER

## 2022-07-25 PROCEDURE — 80061 LIPID PANEL: CPT | Performed by: NURSE PRACTITIONER

## 2022-07-25 PROCEDURE — 97530 THERAPEUTIC ACTIVITIES: CPT | Performed by: PHYSICAL THERAPIST

## 2022-07-25 PROCEDURE — 80053 COMPREHEN METABOLIC PANEL: CPT | Performed by: NURSE PRACTITIONER

## 2022-07-25 PROCEDURE — 97110 THERAPEUTIC EXERCISES: CPT | Performed by: PHYSICAL THERAPIST

## 2022-07-25 PROCEDURE — 36415 COLL VENOUS BLD VENIPUNCTURE: CPT | Performed by: NURSE PRACTITIONER

## 2022-07-25 PROCEDURE — 84443 ASSAY THYROID STIM HORMONE: CPT | Performed by: NURSE PRACTITIONER

## 2022-07-25 RX ORDER — OMEPRAZOLE 40 MG/1
40 CAPSULE, DELAYED RELEASE ORAL 2 TIMES DAILY
Qty: 180 CAPSULE | Refills: 1 | Status: SHIPPED | OUTPATIENT
Start: 2022-07-25 | End: 2022-11-29

## 2022-07-25 NOTE — PROGRESS NOTES
"Chief Complaint  Follow-up (3 month follow up chronic fatigue )    Subjective        Krystyna Fabian presents to [facility]  History of Present Illness    Patient presents for f/u visit.     Patient previously complained of ongoing fatigue, unable to make it through the day without a nap.  Fatigue resolved.  Labs were unremarkable.  Patient was recovering from COVID-19 at that time, likely associated.  Today, she reports fatigue has resolved.    Patient is taking seroquel and Ambien nightly for insomnia.  She reports she is sleeping well, has no acute complaints.    Patient is taking Prilosec 40 mg twice daily for GERD.  Symptoms are stable.  Patient has no acute complaints.    Objective   Vital Signs:  /80 (BP Location: Left arm, Patient Position: Sitting, Cuff Size: Adult)   Pulse 69   Ht 160 cm (63\")   Wt 57.2 kg (126 lb)   SpO2 99%   BMI 22.32 kg/m²   Estimated body mass index is 22.32 kg/m² as calculated from the following:    Height as of this encounter: 160 cm (63\").    Weight as of this encounter: 57.2 kg (126 lb).    BMI is within normal parameters. No other follow-up for BMI required.      Physical Exam  Constitutional:       Appearance: Normal appearance.   HENT:      Head: Normocephalic.   Cardiovascular:      Rate and Rhythm: Normal rate and regular rhythm.   Pulmonary:      Effort: Pulmonary effort is normal.      Breath sounds: Normal breath sounds.   Abdominal:      General: Abdomen is flat. Bowel sounds are normal.      Palpations: Abdomen is soft.   Musculoskeletal:         General: Normal range of motion.      Cervical back: Neck supple.      Right lower leg: No edema.      Left lower leg: No edema.   Skin:     General: Skin is warm and dry.   Neurological:      Mental Status: She is alert and oriented to person, place, and time.      Gait: Gait is intact.   Psychiatric:         Attention and Perception: Attention normal.         Mood and Affect: Mood " normal.         Speech: Speech normal.        Result Review :                Assessment and Plan   Diagnoses and all orders for this visit:    1. Primary insomnia (Primary)  Assessment & Plan:  1.  Continue Seroquel and Ambien as prescribed      2. Vitamin D deficiency  -     Vitamin D 25 Hydroxy    3. Preventative health care  -     CBC & Differential  -     Comprehensive Metabolic Panel  -     Lipid Panel  -     TSH    4. Gastroesophageal reflux disease without esophagitis  Assessment & Plan:  1.  Continue omeprazole 40 mg twice daily      5. Chronic fatigue  Assessment & Plan:  1.  Improved  2.  Call with new or worsening concerns      Other orders  -     omeprazole (priLOSEC) 40 MG capsule; Take 1 capsule by mouth 2 (Two) Times a Day for 90 days.  Dispense: 180 capsule; Refill: 1         I spent 25 minutes caring for Krystyna on this date of service. This time includes time spent by me in the following activities:preparing for the visit, reviewing tests, obtaining and/or reviewing a separately obtained history, performing a medically appropriate examination and/or evaluation , counseling and educating the patient/family/caregiver, ordering medications, tests, or procedures, documenting information in the medical record, independently interpreting results and communicating that information with the patient/family/caregiver and care coordination  Follow Up   Return in about 4 months (around 11/25/2022) for Insomnia.  Patient was given instructions and counseling regarding her condition or for health maintenance advice. Please see specific information pulled into the AVS if appropriate.

## 2022-07-25 NOTE — PROGRESS NOTES
Physical Therapy Daily Treatment Note  VISIT#: 7 POC 12      Patient: Krystyna Fabian  : 1950  Referring practitioner: GERRI Ro DPM  Date of Initial Visit: Type: THERAPY  Noted: 2022  Today's Date: 2022  Patient seen for 7 sessions      Visit Diagnosis:    ICD-10-CM ICD-9-CM   1. Plantar fasciitis of left foot  M72.2 728.71       Subjective     Patient came in with 5/10 left lateral and medial heel foot pain. Hurting more due to mowing her lawn yesterday. Push mower on her feet for about 45 min doing it.  Gave patient FAAM to fill out for MD progress report next PT session.     Objective     See Exercise, Manual, and Modality Logs for complete treatment.     Patient Education: reviewed HEP  Warm up on TREADMILL: was able to finish 10 min  But pain up a little after: up to 6/10 after.   Continued with IASTYM followed with ex.   Practiced 4 way hip up to 20 reps and balancing on Aerex.    Assessment/Plan    Less fibrosis in Left medial lower leg, minimal in L big toe.    during IASTYM where she got her injection, but bruise almost gone.  Left with  Foot more tender after walk on TM and IASTYM RX. 6/10 pain    SHORT TERM GOALS: time for goal achievement: 3 weeks  1. Patient to be compliant with initial HEP-  met  2. Pt able to resume walk longer 30 min with foot pain  , 5 /10 - making progress  3. Pt can tolerate 10 min warmup on Nustep or Bike full revolutions without increasing L foot pain- making progress  4. Patient can tolerate 30 reps Red TB 4 way ankle strength ex. - met: with green    LONG TERM GOALS; Time for goal achievement: 12 visits  1. Pt to have significant improvement on perceived disability score/outcome measure.  2. Patient able to be on feet all day with L foot/heel pain , 2/10.   3. Pt to demonstrate increased stability of the ankle/foot to balance on foam as needed to traverse uneven terrain - met  4. Pt reports she is ready for DC to [facility] for  self management of  her condition.     Progress per Plan of Care and Progress strengthening /stabilization /functional activity  As able to tolerate          Timed:         Manual Therapy:    12     mins  28280;     Therapeutic Exercise:    20    mins  73118;     Neuromuscular Joaquín:        mins  51128;    Therapeutic Activity:   15     mins  93633;     Gait Training:           mins  04041;     Ultrasound:          mins  16875;    Ionto                                   mins   23949  Self Care                            mins   35461  Canalith Repos                   mins  4209  Aquatic                               mins 36342    Un-Timed:  Electrical Stimulation:         mins  94938 ( );  Dry Needling          mins self-pay  Traction          mins 50420    Timed Treatment:   47   mins   Total Treatment:     47   mins    Katie Ying PT  [place] License # [id number]  Physical Therapist

## 2022-07-26 ENCOUNTER — TELEPHONE (OUTPATIENT)
Dept: FAMILY MEDICINE CLINIC | Facility: CLINIC | Age: 72
End: 2022-07-26

## 2022-07-26 LAB
25(OH)D3 SERPL-MCNC: 45.1 NG/ML (ref 30–100)
ALBUMIN SERPL-MCNC: 4.2 G/DL (ref 3.5–5.2)
ALBUMIN/GLOB SERPL: 1.6 G/DL
ALP SERPL-CCNC: 82 U/L (ref 39–117)
ALT SERPL W P-5'-P-CCNC: 30 U/L (ref 1–33)
ANION GAP SERPL CALCULATED.3IONS-SCNC: 12.9 MMOL/L (ref 5–15)
AST SERPL-CCNC: 35 U/L (ref 1–32)
BILIRUB SERPL-MCNC: 0.8 MG/DL (ref 0–1.2)
BUN SERPL-MCNC: 10 MG/DL (ref 8–23)
BUN/CREAT SERPL: 10.5 (ref 7–25)
CALCIUM SPEC-SCNC: 9.6 MG/DL (ref 8.6–10.5)
CHLORIDE SERPL-SCNC: 99 MMOL/L (ref 98–107)
CHOLEST SERPL-MCNC: 222 MG/DL (ref 0–200)
CO2 SERPL-SCNC: 25.1 MMOL/L (ref 22–29)
CREAT SERPL-MCNC: 0.95 MG/DL (ref 0.57–1)
EGFRCR SERPLBLD CKD-EPI 2021: 64.6 ML/MIN/1.73
GLOBULIN UR ELPH-MCNC: 2.7 GM/DL
GLUCOSE SERPL-MCNC: 82 MG/DL (ref 65–99)
HDLC SERPL-MCNC: 56 MG/DL (ref 40–60)
LDLC SERPL CALC-MCNC: 151 MG/DL (ref 0–100)
LDLC/HDLC SERPL: 2.66 {RATIO}
POTASSIUM SERPL-SCNC: 2.3 MMOL/L (ref 3.5–5.2)
PROT SERPL-MCNC: 6.9 G/DL (ref 6–8.5)
SODIUM SERPL-SCNC: 137 MMOL/L (ref 136–145)
TRIGL SERPL-MCNC: 85 MG/DL (ref 0–150)
TSH SERPL DL<=0.05 MIU/L-ACNC: 1.67 UIU/ML (ref 0.27–4.2)
VLDLC SERPL-MCNC: 15 MG/DL (ref 5–40)

## 2022-07-26 RX ORDER — POTASSIUM CHLORIDE 1500 MG/1
20 TABLET, FILM COATED, EXTENDED RELEASE ORAL 2 TIMES DAILY
Qty: 60 TABLET | Refills: 1 | Status: SHIPPED | OUTPATIENT
Start: 2022-07-26 | End: 2022-10-29

## 2022-07-26 NOTE — PROGRESS NOTES
I want patient to start taking 2 tablets daily, starting today.  I have sent a new prescription to the pharmacy.  I want a repeat BMP tomorrow

## 2022-07-26 NOTE — PROGRESS NOTES
Patient's potassium was critically low at 2.3.  How much potassium is patient taking daily?  Her total cholesterol is slightly elevated as is LDL, recommend decreasing fried and fatty foods.  Significant improvement in her triglycerides over the last 5 months.    The 10-year ASCVD risk score (Jonathan BUSBY Jr., et al., 2013) is: 9.7%    Values used to calculate the score:      Age: 72 years      Sex: Female      Is Non- : No      Diabetic: No      Tobacco smoker: No      Systolic Blood Pressure: 128 mmHg      Is BP treated: No      HDL Cholesterol: 56 mg/dL      Total Cholesterol: 222 mg/dL

## 2022-07-27 ENCOUNTER — TREATMENT (OUTPATIENT)
Dept: PHYSICAL THERAPY | Facility: CLINIC | Age: 72
End: 2022-07-27

## 2022-07-27 ENCOUNTER — CLINICAL SUPPORT (OUTPATIENT)
Dept: FAMILY MEDICINE CLINIC | Facility: CLINIC | Age: 72
End: 2022-07-27

## 2022-07-27 DIAGNOSIS — M72.2 PLANTAR FASCIITIS OF LEFT FOOT: Primary | ICD-10-CM

## 2022-07-27 DIAGNOSIS — E87.6 LOW BLOOD POTASSIUM: Primary | ICD-10-CM

## 2022-07-27 PROCEDURE — 97530 THERAPEUTIC ACTIVITIES: CPT | Performed by: PHYSICAL THERAPIST

## 2022-07-27 PROCEDURE — 80048 BASIC METABOLIC PNL TOTAL CA: CPT | Performed by: NURSE PRACTITIONER

## 2022-07-27 PROCEDURE — 36415 COLL VENOUS BLD VENIPUNCTURE: CPT | Performed by: NURSE PRACTITIONER

## 2022-07-27 PROCEDURE — 97140 MANUAL THERAPY 1/> REGIONS: CPT | Performed by: PHYSICAL THERAPIST

## 2022-07-27 PROCEDURE — 97110 THERAPEUTIC EXERCISES: CPT | Performed by: PHYSICAL THERAPIST

## 2022-07-27 PROCEDURE — 97035 APP MDLTY 1+ULTRASOUND EA 15: CPT | Performed by: PHYSICAL THERAPIST

## 2022-07-27 NOTE — PROGRESS NOTES
Physical Therapy Daily Progress Note  VISIT#: 8 POC 12      Patient: Krystyna Fabian  : 1950  Referring practitioner: GERRI Ro DPM  Date of Initial Visit: Type: THERAPY  Noted: 2022  Today's Date: 2022  Patient seen for 8 sessions      Visit Diagnosis:    ICD-10-CM ICD-9-CM   1. Plantar fasciitis of left foot  M72.2 728.71       Subjective     Patient came in with  4 to 5/10 left lateral and medial heel foot pain.   FAAM score: 50% ability up from 25% at eval    Objective     See Exercise, Manual, and Modality Logs for complete treatment.     Patient Education: reviewed HEP  Warm up on TREADMILL: was able to finish 10 min  But pain up a little after: up to 5/10 after.   Continued with IASTYM followed with ex. And US after,  Practiced 4 way hip 20 reps     Assessment/Plan    Fibrosis in Left medial lower leg & L big toe almost gone. Will STOP IASTYM but continue with US.  Less tender during IASTYM where she got her injection, bruise gone but still nodule felt during palpation.    SHORT TERM GOALS: time for goal achievement: 3 weeks  1. Patient to be compliant with initial HEP-  met  2. Pt able to resume walk longer 30 min with foot pain  < 5 /10 - met  3. Pt can tolerate 10 min warmup on Nustep or Bike full revolutions without increasing L foot pain- met  4. Patient can tolerate 30 reps Red TB 4 way ankle strength ex. - met: with green    LONG TERM GOALS; Time for goal achievement: 12 visits  1. Pt to have significant improvement on perceived disability score/outcome measure - met.  2. Patient able to be on feet all day with L foot/heel pain < 2/10.  NOT met  3. Pt to demonstrate increased stability of the ankle/foot to balance on foam as needed to traverse uneven terrain - met  4. Pt reports she is ready for DC to [facility] for self management of  her condition - not met    Progress per Plan of Care and Progress strengthening /stabilization /functional activity  As able to  tolerate          Timed:         Manual Therapy:    10     mins  05804;     Therapeutic Exercise:    15    mins  03017;     Neuromuscular Joaquín:        mins  17585;    Therapeutic Activity:   10     mins  94527;     Gait Training:           mins  69698;     Ultrasound:   10       mins  58574;    Ionto                                   mins   31889  Self Care                            mins   81722  Canalith Repos                   mins  4209  Aquatic                               mins 50609    Un-Timed:  Electrical Stimulation:         mins  65759 ( );  Dry Needling          mins self-pay  Traction          mins 34504    Timed Treatment:   45   mins   Total Treatment:     45   mins    Katie Ying PT  [place] License # [id number]  Physical Therapist

## 2022-07-28 LAB
ANION GAP SERPL CALCULATED.3IONS-SCNC: 12 MMOL/L (ref 5–15)
BUN SERPL-MCNC: 11 MG/DL (ref 8–23)
BUN/CREAT SERPL: 12.2 (ref 7–25)
CALCIUM SPEC-SCNC: 9.1 MG/DL (ref 8.6–10.5)
CHLORIDE SERPL-SCNC: 102 MMOL/L (ref 98–107)
CO2 SERPL-SCNC: 26 MMOL/L (ref 22–29)
CREAT SERPL-MCNC: 0.9 MG/DL (ref 0.57–1)
EGFRCR SERPLBLD CKD-EPI 2021: 68.9 ML/MIN/1.73
GLUCOSE SERPL-MCNC: 78 MG/DL (ref 65–99)
POTASSIUM SERPL-SCNC: 2.9 MMOL/L (ref 3.5–5.2)
SODIUM SERPL-SCNC: 140 MMOL/L (ref 136–145)

## 2022-08-03 ENCOUNTER — TREATMENT (OUTPATIENT)
Dept: PHYSICAL THERAPY | Facility: CLINIC | Age: 72
End: 2022-08-03

## 2022-08-03 DIAGNOSIS — M72.2 PLANTAR FASCIITIS OF LEFT FOOT: Primary | ICD-10-CM

## 2022-08-03 PROCEDURE — 97110 THERAPEUTIC EXERCISES: CPT | Performed by: PHYSICAL THERAPIST

## 2022-08-03 PROCEDURE — 97530 THERAPEUTIC ACTIVITIES: CPT | Performed by: PHYSICAL THERAPIST

## 2022-08-03 PROCEDURE — 97035 APP MDLTY 1+ULTRASOUND EA 15: CPT | Performed by: PHYSICAL THERAPIST

## 2022-08-03 NOTE — PROGRESS NOTES
Physical Therapy Daily Treatment Note  VISIT#: 9 POC 12      Patient: Krystyna Fabian  : 1950  Referring practitioner: GERRI Ro DPM  Date of Initial Visit: Type: THERAPY  Noted: 2022  Today's Date: 8/3/2022  Patient seen for 9 sessions      Visit Diagnosis:    ICD-10-CM ICD-9-CM   1. Plantar fasciitis of left foot  M72.2 728.71       Subjective     Patient came in with  3 to 4/10 left lateral and medial heel foot pain.     Objective     See Exercise, Manual, and Modality Logs for complete treatment.     Patient Education: reviewed HEP  Warm up on TREADMILL:  able to finish 10 min  But pain up a little after: up to 4/10 after.   Continued with  US after,  Practiced 4 way hip 20 reps RED TB, 4 way ankle x 30 Green TB     Assessment/Plan     STOPPED IASTYM but continued with US.      SHORT TERM GOALS: time for goal achievement: 3 weeks  1. Patient to be compliant with initial HEP-  met  2. Pt able to resume walk longer 30 min with foot pain  < 5 /10 - met  3. Pt can tolerate 10 min warmup on Nustep or Bike full revolutions without increasing L foot pain- met  4. Patient can tolerate 30 reps Red TB 4 way ankle strength ex. - met: with green    LONG TERM GOALS; Time for goal achievement: 12 visits  1. Pt to have significant improvement on perceived disability score/outcome measure - met.  2. Patient able to be on feet all day with L foot/heel pain < 2/10 -  NOT met  3. Pt to demonstrate increased stability of the ankle/foot to balance on foam as needed to traverse uneven terrain - met  4. Pt reports she is ready for DC to [facility] for self management of  her condition - not met    Progress per Plan of Care and Progress strengthening /stabilization /functional activity  As able to tolerate          Timed:         Manual Therapy:         mins  98494;     Therapeutic Exercise:    20    mins  33801;     Neuromuscular Joaquín:        mins  21057;    Therapeutic Activity:   10     mins  55102;      Gait Training:           mins  11809;     Ultrasound:   10       mins  84726;    Ionto                                   mins   13896  Self Care                            mins   85967  Canalith Repos                   mins  4209  Aquatic                               mins 34688    Un-Timed:  Electrical Stimulation:         mins  35693 ( );  Dry Needling          mins self-pay  Traction          mins 18904    Timed Treatment:   40   mins   Total Treatment:     40   mins    Katie Ying PT  [place] License # [id number]  Physical Therapist

## 2022-08-04 DIAGNOSIS — F51.01 PRIMARY INSOMNIA: ICD-10-CM

## 2022-08-04 RX ORDER — ZOLPIDEM TARTRATE 10 MG/1
10 TABLET ORAL NIGHTLY PRN
Qty: 30 TABLET | Refills: 0 | Status: SHIPPED | OUTPATIENT
Start: 2022-08-04 | End: 2022-09-01

## 2022-08-10 ENCOUNTER — TREATMENT (OUTPATIENT)
Dept: PHYSICAL THERAPY | Facility: CLINIC | Age: 72
End: 2022-08-10

## 2022-08-10 DIAGNOSIS — M72.2 PLANTAR FASCIITIS OF LEFT FOOT: Primary | ICD-10-CM

## 2022-08-10 PROCEDURE — 97530 THERAPEUTIC ACTIVITIES: CPT | Performed by: PHYSICAL THERAPIST

## 2022-08-10 PROCEDURE — 97035 APP MDLTY 1+ULTRASOUND EA 15: CPT | Performed by: PHYSICAL THERAPIST

## 2022-08-10 PROCEDURE — 97110 THERAPEUTIC EXERCISES: CPT | Performed by: PHYSICAL THERAPIST

## 2022-08-10 NOTE — PROGRESS NOTES
Physical Therapy Daily Treatment Note  VISIT#: 10 POC 12      Patient: Krystyna Fabian  : 1950  Referring practitioner: GERRI Ro DPM  Date of Initial Visit: Type: THERAPY  Noted: 2022  Today's Date: 8/10/2022  Patient seen for 10 sessions      Visit Diagnosis:    ICD-10-CM ICD-9-CM   1. Plantar fasciitis of left foot  M72.2 728.71       Subjective     Patient came in with  3 /10 left lateral and medial heel foot pain.     Objective     See Exercise, Manual, and Modality Logs for complete treatment.     Patient Education: reviewed/practiced HEP  Warm up on TREADMILL: calf stretch on wedge before and after,  able to finish 10 min but pain up a little after: up to 3.5/10 after, stinging burning.    Practiced 4 way hip 20 reps RED TB, 4 way ankle x 30 Green TB and Aerex balance.   Balance much improved.     Assessment/Plan     Continued with US after foot and hip ex. Patient feels it is helping.      SHORT TERM GOALS: time for goal achievement: 3 weeks  1. Patient to be compliant with initial HEP-  met  2. Pt able to resume walk longer 30 min with foot pain  < 5 /10 - met  3. Pt can tolerate 10 min warmup on Nustep or Bike full revolutions without increasing L foot pain - met  4. Patient can tolerate 30 reps Red TB 4 way ankle strength ex. - met: with green    LONG TERM GOALS; Time for goal achievement: 12 visits  1. Pt to have significant improvement on perceived disability score/outcome measure - met.  2. Patient able to be on feet all day with L foot/heel pain < 2/10 -  NOT met  3. Pt to demonstrate increased stability of the ankle/foot to balance on foam as needed to traverse uneven terrain - met  4. Pt reports she is ready for DC to [facility] for self management of  her condition - not met    Progress per Plan of Care and Progress strengthening /stabilization /functional activity  As able to tolerate          Timed:         Manual Therapy:         mins  32374;     Therapeutic  Exercise:    20    mins  33706;     Neuromuscular Joaquín:        mins  72878;    Therapeutic Activity:   10     mins  87863;     Gait Training:           mins  83005;     Ultrasound:   10       mins  55368;    Ionto                                   mins   95283  Self Care                            mins   22792  Canalith Repos                   mins  4209  Aquatic                               mins 53770    Un-Timed:  Electrical Stimulation:         mins  59247 ( );  Dry Needling          mins self-pay  Traction          mins 11235    Timed Treatment:   40   mins   Total Treatment:     40   mins    Katie Ying PT  [place] License # [id number]  Physical Therapist

## 2022-08-12 ENCOUNTER — TREATMENT (OUTPATIENT)
Dept: PHYSICAL THERAPY | Facility: CLINIC | Age: 72
End: 2022-08-12

## 2022-08-12 DIAGNOSIS — M72.2 PLANTAR FASCIITIS OF LEFT FOOT: Primary | ICD-10-CM

## 2022-08-12 PROCEDURE — 97035 APP MDLTY 1+ULTRASOUND EA 15: CPT | Performed by: PHYSICAL THERAPIST

## 2022-08-12 PROCEDURE — 97110 THERAPEUTIC EXERCISES: CPT | Performed by: PHYSICAL THERAPIST

## 2022-08-12 NOTE — PROGRESS NOTES
Physical Therapy Daily Treatment Note  VISIT#: 11 POC 12      Patient: Krystyna Fabian  : 1950  Referring practitioner: GERRI Ro DPM  Date of Initial Visit: Type: THERAPY  Noted: 2022  Today's Date: 2022  Patient seen for 11 sessions      Visit Diagnosis:    ICD-10-CM ICD-9-CM   1. Plantar fasciitis of left foot  M72.2 728.71       Subjective     Patient came in with  4 /10 left lateral and medial heel foot pain. Worse after walking 20 min this am.     Objective     See Exercise, Manual, and Modality Logs for complete treatment.     Patient Education: reviewed/practiced HEP  Calf stretch on wedge before and after standing ex,    Practiced 4 way hip 20 reps RED TB, 4 way ankle x 30 BLUE TB and Aerex balance.     Assessment/Plan     Continued with US after foot and hip ex. Progressed to Blue TB. Skipped TM before due to having walked already and it made foot pain worse.     SHORT TERM GOALS: time for goal achievement: 3 weeks  1. Patient to be compliant with initial HEP-  met  2. Pt able to resume walk longer 30 min with foot pain  < 5 /10 - met  3. Pt can tolerate 10 min warmup on Nustep or Bike full revolutions without increasing L foot pain - met  4. Patient can tolerate 30 reps Red TB 4 way ankle strength ex. - met: with green    LONG TERM GOALS; Time for goal achievement: 12 visits  1. Pt to have significant improvement on perceived disability score/outcome measure - met.  2. Patient able to be on feet all day with L foot/heel pain < 2/10 -  NOT met  3. Pt to demonstrate increased stability of the ankle/foot to balance on foam as needed to traverse uneven terrain - met  4. Pt reports she is ready for DC to [facility] for self management of  her condition - not met    Progress per Plan of Care and Progress strengthening /stabilization /functional activity  As able to tolerate          Timed:         Manual Therapy:         mins  96657;     Therapeutic Exercise:    25    mins   40680;     Neuromuscular Joaquín:        mins  62273;    Therapeutic Activity:        mins  96961;     Gait Training:           mins  53570;     Ultrasound:   10       mins  43511;    Ionto                                   mins   35765  Self Care                            mins   61418  Canalith Repos                   mins  4209  Aquatic                               mins 36722    Un-Timed:  Electrical Stimulation:         mins  21297 ( );  Dry Needling          mins self-pay  Traction          mins 25900    Timed Treatment:   35   mins   Total Treatment:     35   mins    Katie Ying PT  [place] License # [id number]  Physical Therapist

## 2022-08-15 RX ORDER — MONTELUKAST SODIUM 10 MG/1
10 TABLET ORAL NIGHTLY
Qty: 90 TABLET | Refills: 1 | Status: SHIPPED | OUTPATIENT
Start: 2022-08-15 | End: 2023-03-01 | Stop reason: SDUPTHER

## 2022-08-17 ENCOUNTER — TREATMENT (OUTPATIENT)
Dept: PHYSICAL THERAPY | Facility: CLINIC | Age: 72
End: 2022-08-17

## 2022-08-17 DIAGNOSIS — M72.2 PLANTAR FASCIITIS OF LEFT FOOT: Primary | ICD-10-CM

## 2022-08-17 PROCEDURE — 97035 APP MDLTY 1+ULTRASOUND EA 15: CPT | Performed by: PHYSICAL THERAPIST

## 2022-08-17 PROCEDURE — 97110 THERAPEUTIC EXERCISES: CPT | Performed by: PHYSICAL THERAPIST

## 2022-08-17 NOTE — PROGRESS NOTES
Physical Therapy Discharge Summary          Patient: Krystyna Fabian  : 1950  Diagnosis/ICD-10 Code: Plantar fasciitis of left foot [M72.2]  Referring practitioner: GERRI Ro DPM  Date of Initial Visit: Type: THERAPY  Noted: 2022  Today's Date: 2022  Patient seen for 12 sessions      Visit Diagnosis:    ICD-10-CM ICD-9-CM   1. Plantar fasciitis of left foot  M72.2 728.71       Discharge Status of Patient: See MD Note dated: 22    Goals: Partially Met    Discharge Plan: Continue with current home exercise program as instructed  Patient to return to referring/providing physician    Comments:  Patient has plateaued and wishes to be DC from PT to HEP.    Discharge date: 22        Katie Ying PT  [place] License # [id number]  Physical Therapist

## 2022-08-17 NOTE — PROGRESS NOTES
Physical Therapy  Progress Note/Reasessment      Patient: Krystyna Fabian   : 1950  Diagnosis/ICD-10 Code:  Plantar fasciitis of left foot [M72.2]  Referring practitioner: GERRI Ro DPM  Date of Initial Visit: Type: THERAPY  Noted: 2022  Today's Date: 2022  Patient seen for 12 sessions      Subjective:   Visit Diagnosis:    ICD-10-CM ICD-9-CM   1. Plantar fasciitis of left foot  M72.2 728.71       Krystyna Fabian reports: her foot is really hurting today: still 4/10 foot pain. Patient is scheduled to see her foot MD on the . She wishes to be DC from PT to HEP.  Subjective Questionnaire: FAAM: 45% was 25% at al  Clinical Progress: improved  Home Program Compliance: Yes  Treatment has included: therapeutic exercise, neuromuscular re-education, manual therapy, therapeutic activity, gait training, ultrasound and cryotherapy    Subjective     Objective          Active Range of Motion   Left Ankle/Foot   Dorsiflexion (ke): 15 degrees   Plantar flexion: 55 degrees   Inversion: 50 degrees   Eversion: 30 degrees   Great toe flexion: WFL  Great toe extension: WFL    Right Ankle/Foot   Dorsiflexion (ke): 20 degrees   Plantar flexion: 55 degrees   Inversion: 50 degrees   Eversion: 30 degrees   Great toe flexion: WFL  Great toe extension: WFL    Additional Active Range of Motion Details  Did Not re-measure R foot: 22  Patient walked in with good gym shoes with orthotics in. Still reporting 4/10 heel pain.  Walks with normal gait pattern on level and stairs despite pain.  L middle 2nd, 3 rd toes still feeling numb.     Strength/Myotome Testing     Left Ankle/Foot   Dorsiflexion: 5  Plantar flexion: 5  Inversion: 5  Eversion: 5    Right Ankle/Foot   Dorsiflexion: 5  Plantar flexion: 5  Inversion: 5  Eversion: 5    Additional Strength Details  Still heel pain during PF strength testing        Assessment/Plan    Progress toward previous goals: Partially Met  SHORT TERM GOALS: time for  goal achievement: 3 weeks  1. Patient to be compliant with initial HEP-  met  2. Pt able to resume walk longer 30 min with foot pain  < 5 /10 - met  3. Pt can tolerate 10 min warmup on Nustep or Bike full revolutions without increasing L foot pain - met  4. Patient can tolerate 30 reps Red TB 4 way ankle strength ex. - met: with Blue    LONG TERM GOALS; Time for goal achievement: 12 visits  1. Pt to have significant improvement on perceived disability score/outcome measure - met.  2. Patient able to be on feet all day with L foot/heel pain < 2/10 -  NOT met  3. Pt to demonstrate increased stability of the ankle/foot to balance on foam as needed to traverse uneven terrain - met  4. Pt reports she is ready for DC to [facility] for self management of  her condition -  met    Goals  Short-term goals (STG): 4/4 met  Long-term goals (LTG): 3/4 met      Recommendations: Discharge to HEP.    Ref. Back to MD due to plateau. Better than at eval but still heel pain worse when walking longer.    Timed:         Manual Therapy:         mins  90728;     Therapeutic Exercise:    35     mins  84512;     Neuromuscular Joaquín:        mins  86972;    Therapeutic Activity:          mins  09169;     Gait Training:           mins  15286;     Ultrasound:     10     mins  08312;    Ionto                                   mins   44106  Self Care                            mins   69715  Aquatic                               mins 30010      Un-Timed:  Electrical Stimulation:         mins  51070 ( );  Dry Needling          mins self-pay  Traction          mins 15569  Low Eval          Mins  14075  Mod Eval          Mins  89976  High Eval                            Mins  13029  Re-Eval                               mins  38325      Timed Treatment:  45    mins   Total Treatment:     45   mins      PT Signature: Katie Ying PT  [place] License #: [id number]

## 2022-08-18 ENCOUNTER — LAB (OUTPATIENT)
Dept: FAMILY MEDICINE CLINIC | Facility: CLINIC | Age: 72
End: 2022-08-18

## 2022-08-18 DIAGNOSIS — E87.6 HYPOKALEMIA: Primary | ICD-10-CM

## 2022-08-18 PROCEDURE — 36415 COLL VENOUS BLD VENIPUNCTURE: CPT | Performed by: NURSE PRACTITIONER

## 2022-08-18 PROCEDURE — 80048 BASIC METABOLIC PNL TOTAL CA: CPT | Performed by: NURSE PRACTITIONER

## 2022-08-19 LAB
ANION GAP SERPL CALCULATED.3IONS-SCNC: 8.4 MMOL/L (ref 5–15)
BUN SERPL-MCNC: 6 MG/DL (ref 8–23)
BUN/CREAT SERPL: 7.3 (ref 7–25)
CALCIUM SPEC-SCNC: 8.8 MG/DL (ref 8.6–10.5)
CHLORIDE SERPL-SCNC: 106 MMOL/L (ref 98–107)
CO2 SERPL-SCNC: 22.6 MMOL/L (ref 22–29)
CREAT SERPL-MCNC: 0.82 MG/DL (ref 0.57–1)
EGFRCR SERPLBLD CKD-EPI 2021: 77.1 ML/MIN/1.73
GLUCOSE SERPL-MCNC: 76 MG/DL (ref 65–99)
POTASSIUM SERPL-SCNC: 3.6 MMOL/L (ref 3.5–5.2)
SODIUM SERPL-SCNC: 137 MMOL/L (ref 136–145)

## 2022-08-29 ENCOUNTER — OFFICE VISIT (OUTPATIENT)
Dept: PODIATRY | Facility: CLINIC | Age: 72
End: 2022-08-29

## 2022-08-29 VITALS — HEART RATE: 68 BPM | OXYGEN SATURATION: 100 % | HEIGHT: 63 IN | BODY MASS INDEX: 22.32 KG/M2 | WEIGHT: 126 LBS

## 2022-08-29 DIAGNOSIS — M72.2 PLANTAR FASCIITIS OF LEFT FOOT: ICD-10-CM

## 2022-08-29 DIAGNOSIS — M79.672 LEFT FOOT PAIN: Primary | ICD-10-CM

## 2022-08-29 DIAGNOSIS — B35.1 ONYCHOMYCOSIS: ICD-10-CM

## 2022-08-29 PROCEDURE — 99214 OFFICE O/P EST MOD 30 MIN: CPT | Performed by: PODIATRIST

## 2022-08-29 NOTE — PROGRESS NOTES
08/29/2022  Foot and Ankle Surgery - Established Patient/Follow-up  Provider: Dr. Reginald Ro DPM  Location: [facility]    Subjective:  Krystyna Fabian is a 72 y.o. female.     Chief Complaint   Patient presents with   • Left Foot - Pain   • Follow-up     MEGAN Lugo  marilyn  7/27/2022       HPI:    The patient is a 72-year-old female who presents to the clinic for a follow-up regarding her left foot.    The patient states that she is going to have to undergo surgery. She reports that she obtained mild, temporary relief following the steroid injection, but the pain was not fully resolved. She reports that she attended 12 physical therapy treatments and her pain decreased to a 4 or 5 out of 10 intermittently. The patient recalls having a similar operation on her right foot 2 years ago.    The patient states that she underwent a total nail avulsion in 12/2021, performed by MARILYN Ruiz. She adds that the nail has now grown back and is fungal in nature. She would like to repeat this procedure with chemical matrixectomy during her left foot operation.    Allergies   Allergen Reactions   • Butorphanol Itching     stadol   • Erythromycin Other (See Comments)     CAUSES HYPERTENSION         Current Outpatient Medications on File Prior to Visit   Medication Sig Dispense Refill   • Ascorbic Acid (VITAMIN C PO) Take  by mouth.     • baclofen (LIORESAL) 10 MG tablet Take 1 tablet by mouth Every Night. 90 tablet 2   • docusate sodium 100 MG capsule Take 100 mg by mouth.     • montelukast (SINGULAIR) 10 MG tablet TAKE 1 TABLET BY MOUTH EVERY NIGHT 90 tablet 1   • Multiple Vitamins-Minerals (ZINC PO) Take  by mouth.     • omeprazole (priLOSEC) 40 MG capsule Take 1 capsule by mouth 2 (Two) Times a Day for 90 days. 180 capsule 1   • potassium chloride ER (K-TAB) 20 MEQ tablet controlled-release ER tablet TAKE ONE TABLET BY MOUTH TWICE A DAY 60 tablet 2   • potassium chloride ER (K-TAB) 20 MEQ tablet  "controlled-release ER tablet Take 1 tablet by mouth 2 (Two) Times a Day. 60 tablet 1   • QUEtiapine (SEROquel) 50 MG tablet TAKE 1 TABLET AT BEDTIME 90 tablet 0   • VITAMIN D, CHOLECALCIFEROL, PO Take 1 tablet by mouth Daily.     • zolpidem (AMBIEN) 10 MG tablet TAKE 1 TABLET BY MOUTH AT NIGHT AS NEEDED FOR SLEEP 30 tablet 0     No current facility-administered medications on file prior to visit.       Objective   Pulse 68   Ht 160 cm (63\")   Wt 57.2 kg (126 lb)   SpO2 100%   BMI 22.32 kg/m²     Foot/Ankle Exam:       General:   Appearance: appears stated age and healthy    Orientation: AAOx3    Affect: appropriate    Gait: antalgic      VASCULAR      Left Foot Vascularity   Normal vascular exam    Dorsalis pedis:  2+  Posterior tibial:  2+  Skin Temperature: warm    Edema Grading:  None  CFT:  < 3 seconds  Pedal Hair Growth:  Present  Varicosities: none        NEUROLOGIC     Left Foot Neurologic   Light touch sensation:  Normal  Hot/cold sensation: normal    Achilles reflex:  2+     MUSCULOSKELETAL      Left Foot Musculoskeletal   Ecchymosis:  None  Tenderness: plantar fascia and plantar heel    Arch:  Normal     MUSCLE STRENGTH     Left Foot Muscle Strength   Normal strength    Foot dorsiflexion:  5  Foot plantar flexion:  5  Foot inversion:  5  Foot eversion:  5     DERMATOLOGIC     Left Foot Dermatologic   Skin: skin intact        Left Foot Additional Comments: Discomfort with palpation involving the plantar medial calcaneal tuberosity.  No gross deformity or instability.  Moderate equinus contracture with knee extended and flexed.    08/29/2022  Continued pain with palpation involving the plantar medial calcaneal tuberosity of the left heel.   Residual nail growth to the left great toe with moderate thickness and dystrophy.   Mild discomfort with palpation.   No signs of infection.      Assessment & Plan   Diagnoses and all orders for this visit:    1. Left foot pain (Primary)    2. Plantar fasciitis of " left foot  -     CBC (No Diff); Future  -     Basic Metabolic Panel; Future  -     ECG 12 Lead; Future  -     XR Chest 2 View; Future  -     Case Request; Standing  -     Case Request    3. Onychomycosis  -     CBC (No Diff); Future  -     Basic Metabolic Panel; Future  -     ECG 12 Lead; Future  -     XR Chest 2 View; Future  -     Case Request; Standing  -     Case Request    Other orders  -     Follow Anesthesia Guidelines / Standing Orders; Future  -     Obtain Informed Consent; Future  -     Provide NPO Instructions to Patient; Future  -     Chlorhexidine Skin Prep; Future        The patient is a 72-year-old female who presents to the clinic for a follow-up regarding her left foot. She continues to have pain after a steroid injection and 12 sessions of physical therapy. Surgical treatment was discussed due to failing conservative measures. The surgical procedure, risks, goals, and recovery were discussed in detail with the patient today. The procedure will be an outpatient procedure followed by non-weightbearing restrictions for 2 weeks with a knee scooter. Subsequently, she will transition into a boot. The patient underwent this procedure on her right lower extremity 2 years ago. Therefore, she is aware and understands this plan. She was advised that the recovery process and results are not always exactly the same, but hopefully they will be similar if not improved. She will also be undergoing a left total nail avulsion with chemical matrixectomy during her operation as well. Our surgery scheduler will contact the patient to schedule these procedures.     Greater than 30 minutes spent before, during, coming after evaluation for patient care.    Orders Placed This Encounter   Procedures   • XR Chest 2 View     Standing Status:   Future     Standing Expiration Date:   8/30/2023     Order Specific Question:   Reason for Exam:     Answer:   Preop   • CBC (No Diff)     Standing Status:   Future     Standing  Expiration Date:   8/30/2023     Order Specific Question:   Release to patient     Answer:   Routine Release   • Basic Metabolic Panel     Standing Status:   Future     Standing Expiration Date:   8/30/2023     Order Specific Question:   Release to patient     Answer:   Routine Release   • Follow Anesthesia Guidelines / Standing Orders     Standing Status:   Future   • Obtain Informed Consent     Standing Status:   Future     Order Specific Question:   Informed Consent Given For     Answer:   Endoscopic plantar fasciotomy and total hallux nail avulsion with chemical matrixectomy all to the left foot   • Provide NPO Instructions to Patient     Standing Status:   Future   • Chlorhexidine Skin Prep     Chlorhexidine Skin Prep and Instructions For All Patients Having A Procedure Requiring an Outward Incision if Not Allergic. If Allergic, Give Antibacterial Skin Wipes and Instructions. Do Not Use For Facial Cases or on Any Mucus Membranes.     Standing Status:   Future   • ECG 12 Lead     Standing Status:   Future     Standing Expiration Date:   8/30/2023     Order Specific Question:   Reason for Exam:     Answer:   Preop          Note is dictated utilizing voice recognition software. Unfortunately this leads to occasional typographical errors. I apologize in advance if the situation occurs. If questions occur please do not hesitate to call our office.    Transcribed from ambient dictation for GERRI Ro DPM by TESS ALCALA.  08/29/22   12:29 EDT    Patient verbalized consent to the visit recording.  I have personally performed the services described in this document as transcribed by the above individual, and it is both accurate and complete.  GERRI Ro DPM  8/30/2022  07:13 EDT

## 2022-08-31 PROBLEM — B35.1 ONYCHOMYCOSIS: Status: ACTIVE | Noted: 2022-08-31

## 2022-08-31 RX ORDER — VIT C/B6/B5/MAGNESIUM/HERB 173 50-5-6-5MG
CAPSULE ORAL
COMMUNITY

## 2022-09-01 ENCOUNTER — TELEPHONE (OUTPATIENT)
Dept: PODIATRY | Facility: CLINIC | Age: 72
End: 2022-09-01

## 2022-09-01 DIAGNOSIS — F51.01 PRIMARY INSOMNIA: ICD-10-CM

## 2022-09-01 RX ORDER — ZOLPIDEM TARTRATE 10 MG/1
10 TABLET ORAL NIGHTLY PRN
Qty: 30 TABLET | Refills: 0 | Status: SHIPPED | OUTPATIENT
Start: 2022-09-01 | End: 2022-09-29

## 2022-09-01 NOTE — TELEPHONE ENCOUNTER
Caller: PARISA    Best call back number:[phone]    What form or medical record are you requesting: PT NEEDS TWO WORK NOTE ONE JOB IS WORK FROM HOME AND ONLY NEEDS TO BE FOR A WEEK POST OP.     THE OTHER IS OUT OF THE HOUSE ABOUT SHE WOULD NEED A NOTE TO COVER UNTIL SHE IS OUT OF THE HARD CAST       How would you like to receive the form or medical records (pick-up, mail, fax): PT IS GOING TO PICK THEM UP TOMORROW AFTER ANOTHER APPT.  SHE WILL NEED IT DONT BY ABOUT 9.   PLEASE CALL WHEN THEY ARE READY    Additional notes: PT IS REQUESTING THEY BE ADDED TO HER MYCHART.

## 2022-09-02 ENCOUNTER — LAB (OUTPATIENT)
Dept: LAB | Facility: HOSPITAL | Age: 72
End: 2022-09-02

## 2022-09-02 ENCOUNTER — HOSPITAL ENCOUNTER (OUTPATIENT)
Dept: CARDIOLOGY | Facility: HOSPITAL | Age: 72
Discharge: HOME | End: 2022-09-02

## 2022-09-02 ENCOUNTER — HOSPITAL ENCOUNTER (OUTPATIENT)
Dept: GENERAL RADIOLOGY | Facility: HOSPITAL | Age: 72
Discharge: HOME | End: 2022-09-02

## 2022-09-02 DIAGNOSIS — B35.1 ONYCHOMYCOSIS: ICD-10-CM

## 2022-09-02 DIAGNOSIS — M72.2 PLANTAR FASCIITIS OF LEFT FOOT: ICD-10-CM

## 2022-09-02 LAB
ANION GAP SERPL CALCULATED.3IONS-SCNC: 13 MMOL/L (ref 5–15)
BUN SERPL-MCNC: 10 MG/DL (ref 8–23)
BUN/CREAT SERPL: 12.7 (ref 7–25)
CALCIUM SPEC-SCNC: 8.7 MG/DL (ref 8.6–10.5)
CHLORIDE SERPL-SCNC: 108 MMOL/L (ref 98–107)
CO2 SERPL-SCNC: 22 MMOL/L (ref 22–29)
CREAT SERPL-MCNC: 0.79 MG/DL (ref 0.57–1)
DEPRECATED RDW RBC AUTO: 41.2 FL (ref 37–54)
EGFRCR SERPLBLD CKD-EPI 2021: 80.6 ML/MIN/1.73
ERYTHROCYTE [DISTWIDTH] IN BLOOD BY AUTOMATED COUNT: 12.5 % (ref 12.3–15.4)
GLUCOSE SERPL-MCNC: 75 MG/DL (ref 65–99)
HCT VFR BLD AUTO: 40.6 % (ref 34–46.6)
HGB BLD-MCNC: 13.4 G/DL (ref 12–15.9)
MCH RBC QN AUTO: 29.6 PG (ref 26.6–33)
MCHC RBC AUTO-ENTMCNC: 33 G/DL (ref 31.5–35.7)
MCV RBC AUTO: 89.8 FL (ref 79–97)
PLATELET # BLD AUTO: 271 10*3/MM3 (ref 140–450)
PMV BLD AUTO: 11.7 FL (ref 6–12)
POTASSIUM SERPL-SCNC: 3.2 MMOL/L (ref 3.5–5.2)
RBC # BLD AUTO: 4.52 10*6/MM3 (ref 3.77–5.28)
SODIUM SERPL-SCNC: 143 MMOL/L (ref 136–145)
WBC NRBC COR # BLD: 9.96 10*3/MM3 (ref 3.4–10.8)

## 2022-09-02 PROCEDURE — 93005 ELECTROCARDIOGRAM TRACING: CPT | Performed by: PODIATRIST

## 2022-09-02 PROCEDURE — 93010 ELECTROCARDIOGRAM REPORT: CPT | Performed by: INTERNAL MEDICINE

## 2022-09-02 PROCEDURE — 71046 X-RAY EXAM CHEST 2 VIEWS: CPT

## 2022-09-02 PROCEDURE — 85027 COMPLETE CBC AUTOMATED: CPT

## 2022-09-02 PROCEDURE — 80048 BASIC METABOLIC PNL TOTAL CA: CPT

## 2022-09-02 PROCEDURE — 36415 COLL VENOUS BLD VENIPUNCTURE: CPT

## 2022-09-05 LAB — QT INTERVAL: 459 MS

## 2022-09-06 NOTE — PAT
Advised pt to contact PCP about K+ = 3.2.  Advised pt to increase K+ in her diet, will repeat it DOS (9/9/2022)

## 2022-09-07 ENCOUNTER — TELEPHONE (OUTPATIENT)
Dept: PODIATRY | Facility: CLINIC | Age: 72
End: 2022-09-07

## 2022-09-07 NOTE — TELEPHONE ENCOUNTER
Caller: Rui Krystyna ARPIT    Relationship: Self    Best call back number: [phone]     What form or medical record are you requesting:   [place] [employer] FORM REQUESTING TEMPORARY HANDICAPPED PLACARD     Who is requesting this form or medical record from you: PATIENT / SELF     How would you like to receive the form or medical records (pick-up, mail, fax): MAIL TO PATIENT   HOME ADDRESS:   [address] [place] [address] [address]  [place] [address]    Timeframe paperwork needed: BY THIS COMING Monday 09-12-22 IF POSSIBLE OR BY 09/14 - 09/16     Additional notes: PATIENT TO HAVE SURGERY BY DR TAVARES THIS Friday 09-09-22     PATIENT STATED SHE'LL BE ON A KNEE SCOOTER 2 WEEKS POST OP & THEN IN A WALKING BOOT FOR 3-4 WEEKS AFTERWARDS     INITIAL POST OP SCHEDULED 09-22-22     PLEASE CALL / LEAVE VMAIL NOTIFYING PATIENT WHEN [place] [employer] HANDICAPPED PLACARD MAILED BY [facility] TO PATIENT'S HOME     THANKS

## 2022-09-08 ENCOUNTER — ANESTHESIA EVENT (OUTPATIENT)
Dept: PERIOP | Facility: HOSPITAL | Age: 72
End: 2022-09-08

## 2022-09-09 ENCOUNTER — ANESTHESIA (OUTPATIENT)
Dept: PERIOP | Facility: HOSPITAL | Age: 72
End: 2022-09-09

## 2022-09-09 ENCOUNTER — HOSPITAL ENCOUNTER (OUTPATIENT)
Facility: HOSPITAL | Age: 72
Setting detail: HOSPITAL OUTPATIENT SURGERY
Discharge: HOME | End: 2022-09-09
Attending: PODIATRIST | Admitting: PODIATRIST

## 2022-09-09 VITALS
DIASTOLIC BLOOD PRESSURE: 68 MMHG | RESPIRATION RATE: 16 BRPM | SYSTOLIC BLOOD PRESSURE: 108 MMHG | TEMPERATURE: 98.2 F | HEIGHT: 63 IN | OXYGEN SATURATION: 97 % | HEART RATE: 70 BPM | WEIGHT: 140 LBS | BODY MASS INDEX: 24.8 KG/M2

## 2022-09-09 DIAGNOSIS — M72.2 PLANTAR FASCIITIS OF LEFT FOOT: ICD-10-CM

## 2022-09-09 DIAGNOSIS — B35.1 ONYCHOMYCOSIS: ICD-10-CM

## 2022-09-09 LAB
BASE DEFICIT: ABNORMAL
BASE EXCESS BLDV CALC-SCNC: ABNORMAL MMOL/L
CA-I BLDA-SCNC: 1.24 MMOL/L (ref 1.12–1.32)
CO2 CONTENT VENOUS: ABNORMAL
GLUCOSE BLDC GLUCOMTR-MCNC: 96 MG/DL (ref 70–105)
HCO3 BLDV-SCNC: 25.8 MMOL/L (ref 23–28)
HCT VFR BLDA CALC: 44 % (ref 38–51)
HGB BLDA-MCNC: 15 G/DL (ref 12–17)
PCO2 BLDV: 39.7 MM HG (ref 41–51)
PH BLDV: 7.42 PH UNITS (ref 7.31–7.41)
PO2 BLDV: 41 MM HG (ref 35–42)
POTASSIUM BLDA-SCNC: 4.4 MMOL/L (ref 3.5–4.9)
SAO2 % BLDCOV: 27 % (ref 45–75)
SODIUM BLD-SCNC: 141 MMOL/L (ref 138–146)

## 2022-09-09 PROCEDURE — 25010000002 HYDROMORPHONE 1 MG/ML SOLUTION: Performed by: ANESTHESIOLOGY

## 2022-09-09 PROCEDURE — 82947 ASSAY GLUCOSE BLOOD QUANT: CPT

## 2022-09-09 PROCEDURE — 84295 ASSAY OF SERUM SODIUM: CPT

## 2022-09-09 PROCEDURE — 11730 AVULSION NAIL PLATE SIMPLE 1: CPT | Performed by: PODIATRIST

## 2022-09-09 PROCEDURE — 85014 HEMATOCRIT: CPT

## 2022-09-09 PROCEDURE — 25010000002 CEFAZOLIN PER 500 MG: Performed by: PODIATRIST

## 2022-09-09 PROCEDURE — 84132 ASSAY OF SERUM POTASSIUM: CPT

## 2022-09-09 PROCEDURE — 82330 ASSAY OF CALCIUM: CPT

## 2022-09-09 PROCEDURE — 25010000002 FENTANYL CITRATE (PF) 100 MCG/2ML SOLUTION: Performed by: ANESTHESIOLOGY

## 2022-09-09 PROCEDURE — 82803 BLOOD GASES ANY COMBINATION: CPT

## 2022-09-09 PROCEDURE — 25010000002 DEXAMETHASONE PER 1 MG: Performed by: ANESTHESIOLOGY

## 2022-09-09 PROCEDURE — 25010000002 ONDANSETRON PER 1 MG: Performed by: ANESTHESIOLOGY

## 2022-09-09 PROCEDURE — 25010000002 PROPOFOL 10 MG/ML EMULSION: Performed by: ANESTHESIOLOGY

## 2022-09-09 PROCEDURE — 0 LIDOCAINE 1 % SOLUTION: Performed by: ANESTHESIOLOGY

## 2022-09-09 PROCEDURE — 29893 ENDOSCOPIC PLANTR FASCIOTOMY: CPT | Performed by: PODIATRIST

## 2022-09-09 RX ORDER — SODIUM CHLORIDE, SODIUM LACTATE, POTASSIUM CHLORIDE, CALCIUM CHLORIDE 600; 310; 30; 20 MG/100ML; MG/100ML; MG/100ML; MG/100ML
9 INJECTION, SOLUTION INTRAVENOUS CONTINUOUS PRN
Status: DISCONTINUED | OUTPATIENT
Start: 2022-09-09 | End: 2022-09-09 | Stop reason: HOSPADM

## 2022-09-09 RX ORDER — LABETALOL HYDROCHLORIDE 5 MG/ML
5 INJECTION, SOLUTION INTRAVENOUS
Status: DISCONTINUED | OUTPATIENT
Start: 2022-09-09 | End: 2022-09-09 | Stop reason: HOSPADM

## 2022-09-09 RX ORDER — ONDANSETRON 2 MG/ML
4 INJECTION INTRAMUSCULAR; INTRAVENOUS ONCE AS NEEDED
Status: DISCONTINUED | OUTPATIENT
Start: 2022-09-09 | End: 2022-09-09 | Stop reason: HOSPADM

## 2022-09-09 RX ORDER — IPRATROPIUM BROMIDE AND ALBUTEROL SULFATE 2.5; .5 MG/3ML; MG/3ML
3 SOLUTION RESPIRATORY (INHALATION) ONCE AS NEEDED
Status: DISCONTINUED | OUTPATIENT
Start: 2022-09-09 | End: 2022-09-09 | Stop reason: HOSPADM

## 2022-09-09 RX ORDER — DROPERIDOL 2.5 MG/ML
1.25 INJECTION, SOLUTION INTRAMUSCULAR; INTRAVENOUS ONCE AS NEEDED
Status: DISCONTINUED | OUTPATIENT
Start: 2022-09-09 | End: 2022-09-09 | Stop reason: HOSPADM

## 2022-09-09 RX ORDER — ONDANSETRON 2 MG/ML
INJECTION INTRAMUSCULAR; INTRAVENOUS AS NEEDED
Status: DISCONTINUED | OUTPATIENT
Start: 2022-09-09 | End: 2022-09-09 | Stop reason: SURG

## 2022-09-09 RX ORDER — SODIUM CHLORIDE 0.9 % (FLUSH) 0.9 %
10 SYRINGE (ML) INJECTION AS NEEDED
Status: DISCONTINUED | OUTPATIENT
Start: 2022-09-09 | End: 2022-09-09 | Stop reason: HOSPADM

## 2022-09-09 RX ORDER — OXYCODONE HYDROCHLORIDE 5 MG/1
10 TABLET ORAL EVERY 4 HOURS PRN
Status: DISCONTINUED | OUTPATIENT
Start: 2022-09-09 | End: 2022-09-09 | Stop reason: HOSPADM

## 2022-09-09 RX ORDER — DIPHENHYDRAMINE HYDROCHLORIDE 50 MG/ML
12.5 INJECTION INTRAMUSCULAR; INTRAVENOUS
Status: DISCONTINUED | OUTPATIENT
Start: 2022-09-09 | End: 2022-09-09 | Stop reason: HOSPADM

## 2022-09-09 RX ORDER — DEXAMETHASONE SODIUM PHOSPHATE 4 MG/ML
INJECTION, SOLUTION INTRA-ARTICULAR; INTRALESIONAL; INTRAMUSCULAR; INTRAVENOUS; SOFT TISSUE AS NEEDED
Status: DISCONTINUED | OUTPATIENT
Start: 2022-09-09 | End: 2022-09-09 | Stop reason: SURG

## 2022-09-09 RX ORDER — LIDOCAINE HYDROCHLORIDE 10 MG/ML
INJECTION, SOLUTION INFILTRATION; PERINEURAL AS NEEDED
Status: DISCONTINUED | OUTPATIENT
Start: 2022-09-09 | End: 2022-09-09 | Stop reason: SURG

## 2022-09-09 RX ORDER — BUPIVACAINE HYDROCHLORIDE 5 MG/ML
INJECTION, SOLUTION PERINEURAL AS NEEDED
Status: DISCONTINUED | OUTPATIENT
Start: 2022-09-09 | End: 2022-09-09 | Stop reason: HOSPADM

## 2022-09-09 RX ORDER — HYDROCODONE BITARTRATE AND ACETAMINOPHEN 5; 325 MG/1; MG/1
1 TABLET ORAL ONCE AS NEEDED
Status: COMPLETED | OUTPATIENT
Start: 2022-09-09 | End: 2022-09-09

## 2022-09-09 RX ORDER — MIDAZOLAM HYDROCHLORIDE 1 MG/ML
0.5 INJECTION INTRAMUSCULAR; INTRAVENOUS
Status: DISCONTINUED | OUTPATIENT
Start: 2022-09-09 | End: 2022-09-09 | Stop reason: HOSPADM

## 2022-09-09 RX ORDER — SODIUM CHLORIDE 0.9 % (FLUSH) 0.9 %
10 SYRINGE (ML) INJECTION EVERY 12 HOURS SCHEDULED
Status: DISCONTINUED | OUTPATIENT
Start: 2022-09-09 | End: 2022-09-09 | Stop reason: HOSPADM

## 2022-09-09 RX ORDER — GINSENG 100 MG
CAPSULE ORAL AS NEEDED
Status: DISCONTINUED | OUTPATIENT
Start: 2022-09-09 | End: 2022-09-09 | Stop reason: HOSPADM

## 2022-09-09 RX ORDER — HYDRALAZINE HYDROCHLORIDE 20 MG/ML
5 INJECTION INTRAMUSCULAR; INTRAVENOUS
Status: DISCONTINUED | OUTPATIENT
Start: 2022-09-09 | End: 2022-09-09 | Stop reason: HOSPADM

## 2022-09-09 RX ORDER — HYDROCODONE BITARTRATE AND ACETAMINOPHEN 7.5; 325 MG/1; MG/1
1 TABLET ORAL EVERY 6 HOURS PRN
Qty: 28 TABLET | Refills: 0 | Status: SHIPPED | OUTPATIENT
Start: 2022-09-09

## 2022-09-09 RX ORDER — PROPOFOL 10 MG/ML
VIAL (ML) INTRAVENOUS AS NEEDED
Status: DISCONTINUED | OUTPATIENT
Start: 2022-09-09 | End: 2022-09-09 | Stop reason: SURG

## 2022-09-09 RX ORDER — FENTANYL CITRATE 50 UG/ML
INJECTION, SOLUTION INTRAMUSCULAR; INTRAVENOUS AS NEEDED
Status: DISCONTINUED | OUTPATIENT
Start: 2022-09-09 | End: 2022-09-09 | Stop reason: SURG

## 2022-09-09 RX ADMIN — HYDROCODONE BITARTRATE AND ACETAMINOPHEN 1 TABLET: 5; 325 TABLET ORAL at 13:44

## 2022-09-09 RX ADMIN — CEFAZOLIN 2 G: 2 INJECTION, POWDER, FOR SOLUTION INTRAMUSCULAR; INTRAVENOUS at 12:31

## 2022-09-09 RX ADMIN — DEXAMETHASONE SODIUM PHOSPHATE 4 MG: 4 INJECTION, SOLUTION INTRAMUSCULAR; INTRAVENOUS at 12:47

## 2022-09-09 RX ADMIN — ONDANSETRON 4 MG: 2 INJECTION INTRAMUSCULAR; INTRAVENOUS at 13:00

## 2022-09-09 RX ADMIN — SODIUM CHLORIDE, POTASSIUM CHLORIDE, SODIUM LACTATE AND CALCIUM CHLORIDE 9 ML/HR: 600; 310; 30; 20 INJECTION, SOLUTION INTRAVENOUS at 10:24

## 2022-09-09 RX ADMIN — LIDOCAINE HYDROCHLORIDE 50 MG: 10 INJECTION, SOLUTION INFILTRATION; PERINEURAL at 12:28

## 2022-09-09 RX ADMIN — HYDROMORPHONE HYDROCHLORIDE 0.5 MG: 1 INJECTION, SOLUTION INTRAMUSCULAR; INTRAVENOUS; SUBCUTANEOUS at 13:35

## 2022-09-09 RX ADMIN — PROPOFOL 140 MG: 10 INJECTION, EMULSION INTRAVENOUS at 12:28

## 2022-09-09 RX ADMIN — FENTANYL CITRATE 50 MCG: 50 INJECTION, SOLUTION INTRAMUSCULAR; INTRAVENOUS at 12:37

## 2022-09-09 RX ADMIN — FENTANYL CITRATE 50 MCG: 50 INJECTION, SOLUTION INTRAMUSCULAR; INTRAVENOUS at 12:28

## 2022-09-09 NOTE — ANESTHESIA POSTPROCEDURE EVALUATION
Patient: Krystyna Fabian    Procedure Summary     Date: 09/09/22 Room / Location: [facility] OR 07 / [facility] MAIN OR    Anesthesia Start: 1222 Anesthesia Stop: 1315    Procedures:       PLANTAR FASCIOTOMY (Left Foot)      NAIL BED REMOVAL/REVISION (Left ) Diagnosis:       Plantar fasciitis of left foot      Onychomycosis      (Plantar fasciitis of left foot [M72.2])      (Onychomycosis [B35.1])    Surgeons: GERRI Ro DPM Provider: Tai Perla MD    Anesthesia Type: general ASA Status: 2          Anesthesia Type: general    Vitals  Vitals Value Taken Time   /69 09/09/22 1401   Temp 97.8 °F (36.6 °C) 09/09/22 1400   Pulse 68 09/09/22 1405   Resp 8 09/09/22 1400   SpO2 95 % 09/09/22 1405   Vitals shown include unvalidated device data.        Post Anesthesia Care and Evaluation    Patient location during evaluation: PACU  Patient participation: complete - patient participated  Level of consciousness: sleepy but conscious and responsive to verbal stimuli  Pain score: 0  Pain management: adequate    Airway patency: patent  Anesthetic complications: No anesthetic complications  PONV Status: none  Cardiovascular status: acceptable  Respiratory status: acceptable  Hydration status: acceptable

## 2022-09-09 NOTE — ANESTHESIA PROCEDURE NOTES
Airway  Urgency: elective    Date/Time: 9/9/2022 12:30 PM  Airway not difficult    General Information and Staff    Patient location during procedure: OR  Anesthesiologist: Tai Perla MD  CRNA/CAA: Anil Moreno CAA    Indications and Patient Condition  Indications for airway management: airway protection    Preoxygenated: yes  MILS not maintained throughout  Mask difficulty assessment: 0 - not attempted    Final Airway Details  Final airway type: supraglottic airway      Successful airway: unique  Size 4    Number of attempts at approach: 1  Assessment: lips, teeth, and gum same as pre-op and atraumatic intubation

## 2022-09-09 NOTE — ANESTHESIA PREPROCEDURE EVALUATION
Anesthesia Evaluation     Patient summary reviewed and Nursing notes reviewed   NPO Solid Status: > 8 hours  NPO Liquid Status: > 8 hours           Airway   Mallampati: II  TM distance: >3 FB  Neck ROM: full  No difficulty expected  Dental - normal exam     Pulmonary - negative pulmonary ROS and normal exam   Cardiovascular - negative cardio ROS and normal exam    ECG reviewed        Neuro/Psych  (+) psychiatric history Anxiety and Depression,    GI/Hepatic/Renal/Endo    (+)  GERD well controlled,      Musculoskeletal     Abdominal  - normal exam    Bowel sounds: normal.   Substance History - negative use     OB/GYN negative ob/gyn ROS         Other   arthritis,    history of cancer remission                  Anesthesia Plan    ASA 2     general     intravenous induction     Anesthetic plan, risks, benefits, and alternatives have been provided, discussed and informed consent has been obtained with: patient.    Plan discussed with CRNA and CAA.        CODE STATUS:

## 2022-09-09 NOTE — OP NOTE
Operative Note   Foot and Ankle Surgery   Provider: Dr. Reginald Ro   Location: [facility]      Procedure:  1.  Endoscopic plantar fasciotomy, left  2.  Total nail avulsion with chemical matrixectomy to the left great toe    Pre-operative Diagnosis:   1.  Plantar fasciitis, left  2.  Onychomycosis, left hallux    Post-operative Diagnosis: Same    Surgeon: Reginald Ro    Assistant: None    Anesthesia: General    Implants: None    Findings: No unexpected findings    Specimen: None    Blood Loss: Less than 5cc    Complications: None    Post Op Plan: Discharge home.  Strict nonweightbearing activity.  Follow-up with me in 2 weeks    Summary:    Patient is a 72-year-old female that has been seen in office for issues involving left heel pain.  Patient has been treated conservatively for plantar fasciitis for several months without any significant improvement.  She has had similar issues involving her right lower extremity and responded well to endoscopic plantar fasciotomy.  I have recommended that we proceed with this option to her left heel at this time.  Patient understands and agrees.  She would also like to proceed with total nail removal involving her left great toe secondary to thickness, pain, and dystrophy of the toenail.    Procedure, risks, complications, and goals were discussed with the patient at bedside.  Risks include but are not limited to infection, complications from anesthesia (including death), chronic pain or numbness, hematoma/seroma, deep vein thrombosis, wound complications, and potential for additional surgical procedures.  Patient understands and elects to proceed with surgery at this time. Informed consent was obtained before proceeding to the operating suite.  All questions were answered to the patient's satisfaction. No guarantees or assurances were given or implied.    Procedure:    Patient was brought to the operating room and placed on the operative table in a supine  position.  Once adequate general anesthesia was administered, a pneumatic tourniquet was placed about the patient's operative calf.  The foot was scrubbed prepped and draped in the usual sterile fashion.  The limb was elevated and exsanguinated and the pneumatic tourniquet was inflated to 275 mmHg.  A formal timeout was conducted prior skin incision.    Attention was then directed to the heel.  A small stab incision was performed to the medial plantar aspect of the heel close to the level of the plantar fascia origin.  Blunt dissection was performed to the level of the ligament.  The plantar fascial ligament was identified and undermined with a Manilla.  The trocar and cannula were then introduced into the medial portal and a lateral exit portal was obtained.  All stab incision was performed allowing for exit of the trocar.  The trocar was removed and the 2.7 mm arthroscope was introduced into the lateral portal.  The plantar fascial ligament was directly visualized and the foot with the hallux was placed into dorsiflexion allowing for maximal tension on the plantar fascial ligament.  Approximately 50% of the ligament was transected utilizing a triangle blade.  The resection was checked multiple times with a blunt probe to ensure adequate release.  The muscle belly of the flexor digitorum longus was identified.  The wound was irrigated with copious amounts of normal saline.  All instrumentation was removed.  A postoperative block was performed utilizing 20 cc of half percent Marcaine plain.  The incisions were closed with a 3-0 nylon.     Attention was then directed to the left great toe. The nail was lifted from the nail bed and nail margin with a Manilla.  The offending nail margins were grasped with a hemostat and removed. The nail borders were explored with a curette to ensure adequate removal.  Matrixectomy was performed utilizing three 30 second applications of 89% phenol on a micro-tip applicator.  The area was  then rinsed with alcohol to dilute the phenol. The nail fold and exposed nail bed were flushed with normal saline.  Antibiotic ointment followed by sterile compressive dressings were then applied.       The heel wounds were dressed with Xeroform and sterile compressive dressings.  The tourniquet was released and prompt hyperemic response was noted to all digits of the operative foot.  The limb was placed into a well-padded posterior splint.  Patient tolerated the procedure and anesthesia well. She was transferred from the operating room to the recovery room with vital signs stable and neurovascular status unchanged to the operative extremity.      Dr. Reginald Ro, DAJUAN  [facility]  [phone]    Note is dictated utilizing voice recognition software. Unfortunately this leads to occasional typographical errors. I apologize in advance if the situation occurs. If questions occur please do not hesitate to call our office.

## 2022-09-22 ENCOUNTER — OFFICE VISIT (OUTPATIENT)
Dept: PODIATRY | Facility: CLINIC | Age: 72
End: 2022-09-22

## 2022-09-22 VITALS — OXYGEN SATURATION: 97 % | HEIGHT: 63 IN | WEIGHT: 140 LBS | BODY MASS INDEX: 24.8 KG/M2 | HEART RATE: 75 BPM

## 2022-09-22 DIAGNOSIS — M79.672 LEFT FOOT PAIN: Primary | ICD-10-CM

## 2022-09-22 DIAGNOSIS — B35.1 ONYCHOMYCOSIS: ICD-10-CM

## 2022-09-22 DIAGNOSIS — M72.2 PLANTAR FASCIITIS OF LEFT FOOT: ICD-10-CM

## 2022-09-22 PROCEDURE — 99024 POSTOP FOLLOW-UP VISIT: CPT | Performed by: PODIATRIST

## 2022-09-22 NOTE — PROGRESS NOTES
"09/22/2022  Foot and Ankle Surgery - Established Patient/Follow-up  Provider: Dr. Reginald Ro, DAJUAN  Location: [facility]    Subjective:  Krystyna Fabian is a 72 y.o. female.     Chief Complaint   Patient presents with   • Left Foot - Post-op   • Post-op     OBED Lugo  APRN 07/27/2022       HPI:   Patient is a 72-year-old female who returns 2 weeks after EPF and total nail avulsion with chemical matrixectomy.    The patient states that she is doing well. She states that she has experienced any pain, but she notes still having some tenderness at the incision site.    Allergies   Allergen Reactions   • Butorphanol Itching     Stadol  (felt like \"bugs were crawling all over me\")   • Erythromycin Photosensitivity, Other (See Comments) and Headache     Caused elevated BP         Current Outpatient Medications on File Prior to Visit   Medication Sig Dispense Refill   • Ascorbic Acid (VITAMIN C PO) Take  by mouth.     • baclofen (LIORESAL) 10 MG tablet Take 1 tablet by mouth Every Night. 90 tablet 2   • docusate sodium 100 MG capsule Take 100 mg by mouth.     • HYDROcodone-acetaminophen (NORCO) 7.5-325 MG per tablet Take 1 tablet by mouth Every 6 (Six) Hours As Needed for Moderate Pain (Pain). 28 tablet 0   • montelukast (SINGULAIR) 10 MG tablet TAKE 1 TABLET BY MOUTH EVERY NIGHT (Patient taking differently: Take 10 mg by mouth Daily.) 90 tablet 1   • Multiple Vitamins-Minerals (ZINC PO) Take  by mouth.     • omeprazole (priLOSEC) 40 MG capsule Take 1 capsule by mouth 2 (Two) Times a Day for 90 days. 180 capsule 1   • potassium chloride ER (K-TAB) 20 MEQ tablet controlled-release ER tablet TAKE ONE TABLET BY MOUTH TWICE A DAY 60 tablet 2   • potassium chloride ER (K-TAB) 20 MEQ tablet controlled-release ER tablet Take 1 tablet by mouth 2 (Two) Times a Day. 60 tablet 1   • QUEtiapine (SEROquel) 50 MG tablet TAKE 1 TABLET AT BEDTIME 90 tablet 0   • Turmeric 500 MG capsule Take  by mouth.     • VITAMIN D, " "CHOLECALCIFEROL, PO Take 1 tablet by mouth Daily.     • zolpidem (AMBIEN) 10 MG tablet TAKE 1 TABLET BY MOUTH AT NIGHT AS NEEDED FOR SLEEP 30 tablet 0     No current facility-administered medications on file prior to visit.       Objective   Pulse 75   Ht 160 cm (63\")   Wt 63.5 kg (140 lb)   SpO2 97%   BMI 24.80 kg/m²     Foot/Ankle Exam:       General:   Appearance: appears stated age and healthy    Orientation: AAOx3    Affect: appropriate    Gait: antalgic      VASCULAR      Left Foot Vascularity   Normal vascular exam    Dorsalis pedis:  2+  Posterior tibial:  2+  Skin Temperature: warm    Edema Grading:  None  CFT:  < 3 seconds  Pedal Hair Growth:  Present  Varicosities: none        NEUROLOGIC     Left Foot Neurologic   Light touch sensation:  Normal  Hot/cold sensation: normal    Achilles reflex:  2+     MUSCULOSKELETAL      Left Foot Musculoskeletal   Ecchymosis:  None  Tenderness: plantar fascia and plantar heel    Arch:  Normal     MUSCLE STRENGTH     Left Foot Muscle Strength   Normal strength    Foot dorsiflexion:  5  Foot plantar flexion:  5  Foot inversion:  5  Foot eversion:  5     DERMATOLOGIC     Left Foot Dermatologic   Skin: skin intact        Left Foot Additional Comments: Discomfort with palpation involving the plantar medial calcaneal tuberosity.  No gross deformity or instability.  Moderate equinus contracture with knee extended and flexed.    08/29/2022  Continued pain with palpation involving the plantar medial calcaneal tuberosity of the left heel.   Residual nail growth to the left great toe with moderate thickness and dystrophy.   Mild discomfort with palpation.   No signs of infection.    09/22/2022  Incision sites are dry and stable with intact sutures. Hallux nailbed is stable without signs of infection.      Assessment & Plan   Diagnoses and all orders for this visit:    1. Left foot pain (Primary)    2. Plantar fasciitis of left foot    3. Onychomycosis      Patient presents for " status post op regarding her recent EPF and total nail avulsion with chemical matrixectomy. We discussed the status of the patient's healing. Her sutures were removed today in office. I have asked the patient to continue Epsom salt soaks and applying Band-Aids with Neosporin to the incision site. The patient will continue wearing the walking boot with insert for additional 3 weeks and then transition into a tennis shoe with inserts. She will start doing some stretching exercises and start utilizing a tennis ball. The patient will follow up in 4 weeks for reevaluation.     No orders of the defined types were placed in this encounter.         Note is dictated utilizing voice recognition software. Unfortunately this leads to occasional typographical errors. I apologize in advance if the situation occurs. If questions occur please do not hesitate to call our office.    Transcribed from ambient dictation for GERRI Ro DPM by Rea Murillo.  09/22/22   13:59 EDT    Patient verbalized consent to the visit recording.  I have personally performed the services described in this document as transcribed by the above individual, and it is both accurate and complete.  GERRI Ro DPM  9/24/2022  11:09 EDT

## 2022-09-29 DIAGNOSIS — F51.01 PRIMARY INSOMNIA: ICD-10-CM

## 2022-09-29 RX ORDER — ZOLPIDEM TARTRATE 10 MG/1
10 TABLET ORAL NIGHTLY PRN
Qty: 30 TABLET | Refills: 0 | Status: SHIPPED | OUTPATIENT
Start: 2022-09-29 | End: 2022-10-31

## 2022-10-03 ENCOUNTER — TELEPHONE (OUTPATIENT)
Dept: FAMILY MEDICINE CLINIC | Facility: CLINIC | Age: 72
End: 2022-10-03

## 2022-10-03 RX ORDER — AMOXICILLIN 500 MG/1
500 CAPSULE ORAL 2 TIMES DAILY
Qty: 20 CAPSULE | Refills: 0 | Status: SHIPPED | OUTPATIENT
Start: 2022-10-03 | End: 2022-10-13

## 2022-10-03 NOTE — TELEPHONE ENCOUNTER
Pt called in with concern of headache, sinus congestion, can't breath through nose, no fever.  Pt states symptoms started a couple days ago.  Pt was wanting same day appt, informed that schedule is full.  Please advise

## 2022-10-03 NOTE — TELEPHONE ENCOUNTER
Schedule is full.  I am sending in an antibiotic for acute sinus infection.  If symptoms persist or worsen, she should let us know.  I would also recommend nasal irrigation with saline and Flonase 2 sprays each nostril daily

## 2022-10-20 ENCOUNTER — OFFICE VISIT (OUTPATIENT)
Dept: PODIATRY | Facility: CLINIC | Age: 72
End: 2022-10-20

## 2022-10-20 VITALS — OXYGEN SATURATION: 96 % | HEART RATE: 63 BPM | WEIGHT: 140 LBS | BODY MASS INDEX: 24.8 KG/M2 | HEIGHT: 63 IN

## 2022-10-20 DIAGNOSIS — B35.1 ONYCHOMYCOSIS: ICD-10-CM

## 2022-10-20 DIAGNOSIS — M72.2 PLANTAR FASCIITIS OF LEFT FOOT: ICD-10-CM

## 2022-10-20 DIAGNOSIS — M79.672 LEFT FOOT PAIN: Primary | ICD-10-CM

## 2022-10-20 PROCEDURE — 99024 POSTOP FOLLOW-UP VISIT: CPT | Performed by: PODIATRIST

## 2022-10-20 NOTE — PROGRESS NOTES
"10/20/2022  Foot and Ankle Surgery - Established Patient/Follow-up  Provider: Dr. Reginald Ro, DAJUAN  Location: [facility]    Subjective:  Krystyna Fabian is a 72 y.o. female.     Chief Complaint   Patient presents with   • Left Foot - Pain, Plantar Fasciitis   • Post-op     OBED Lugo 07/27/2022       HPI:     The patient is a 72-year-old female who returns for followup approximately 6 weeks status post endoscopic plantar fasciotomy on the left foot.    The patient reports that she is doing well with no significant pain aside from mild tenderness at the incision site. She states she has returned to wearing a regular shoe.     Allergies   Allergen Reactions   • Butorphanol Itching     Stadol  (felt like \"bugs were crawling all over me\")   • Erythromycin Photosensitivity, Other (See Comments) and Headache     Caused elevated BP         Current Outpatient Medications on File Prior to Visit   Medication Sig Dispense Refill   • Ascorbic Acid (VITAMIN C PO) Take  by mouth.     • baclofen (LIORESAL) 10 MG tablet Take 1 tablet by mouth Every Night. 90 tablet 2   • docusate sodium 100 MG capsule Take 100 mg by mouth.     • HYDROcodone-acetaminophen (NORCO) 7.5-325 MG per tablet Take 1 tablet by mouth Every 6 (Six) Hours As Needed for Moderate Pain (Pain). 28 tablet 0   • montelukast (SINGULAIR) 10 MG tablet TAKE 1 TABLET BY MOUTH EVERY NIGHT (Patient taking differently: Take 1 tablet by mouth Daily.) 90 tablet 1   • Multiple Vitamins-Minerals (ZINC PO) Take  by mouth.     • omeprazole (priLOSEC) 40 MG capsule Take 1 capsule by mouth 2 (Two) Times a Day for 90 days. 180 capsule 1   • potassium chloride ER (K-TAB) 20 MEQ tablet controlled-release ER tablet TAKE ONE TABLET BY MOUTH TWICE A DAY 60 tablet 2   • potassium chloride ER (K-TAB) 20 MEQ tablet controlled-release ER tablet Take 1 tablet by mouth 2 (Two) Times a Day. 60 tablet 1   • QUEtiapine (SEROquel) 50 MG tablet TAKE 1 TABLET AT BEDTIME 90 tablet " "0   • Turmeric 500 MG capsule Take  by mouth.     • VITAMIN D, CHOLECALCIFEROL, PO Take 1 tablet by mouth Daily.     • zolpidem (AMBIEN) 10 MG tablet TAKE 1 TABLET BY MOUTH AT NIGHT AS NEEDED FOR SLEEP 30 tablet 0     No current facility-administered medications on file prior to visit.       Objective   Pulse 63   Ht 160 cm (63\")   Wt 63.5 kg (140 lb)   SpO2 96%   BMI 24.80 kg/m²     Foot/Ankle Exam:       General:   Appearance: appears stated age and healthy    Orientation: AAOx3    Affect: appropriate    Gait: antalgic      VASCULAR      Left Foot Vascularity   Normal vascular exam    Dorsalis pedis:  2+  Posterior tibial:  2+  Skin Temperature: warm    Edema Grading:  None  CFT:  < 3 seconds  Pedal Hair Growth:  Present  Varicosities: none        NEUROLOGIC     Left Foot Neurologic   Light touch sensation:  Normal  Hot/cold sensation: normal    Achilles reflex:  2+     MUSCULOSKELETAL      Left Foot Musculoskeletal   Ecchymosis:  None  Tenderness: plantar fascia and plantar heel    Arch:  Normal     MUSCLE STRENGTH     Left Foot Muscle Strength   Normal strength    Foot dorsiflexion:  5  Foot plantar flexion:  5  Foot inversion:  5  Foot eversion:  5     DERMATOLOGIC     Left Foot Dermatologic   Skin: skin intact        Left Foot Additional Comments: Discomfort with palpation involving the plantar medial calcaneal tuberosity.  No gross deformity or instability.  Moderate equinus contracture with knee extended and flexed.    08/29/2022  Continued pain with palpation involving the plantar medial calcaneal tuberosity of the left heel.   Residual nail growth to the left great toe with moderate thickness and dystrophy.   Mild discomfort with palpation.   No signs of infection.    10/20/2022  No significant pain involving the plantar aspect of the calcaneus. The nailbed is healing nicely with granulation tissue. No signs of infection or inflammation.    09/22/2022  Incision sites are dry and stable with intact " sutures. Hallux nailbed is stable without signs of infection.      Assessment & Plan   Diagnoses and all orders for this visit:    1. Left foot pain (Primary)    2. Plantar fasciitis of left foot    3. Onychomycosis      The patient is a 72-year-old female who returns for followup approximately 6 weeks status post endoscopic plantar fasciotomy on the left foot. No results were obtained or interpreted today. The incision is healing nicely with no signs of infection or inflammation. At this time, she can gradually increase activity, especially low impact. However, she should continue to be cautious of activities involving high weight or ambulating long distances or on uneven terrain. The patient is advised to continue wearing supportive shoes and inserts. Her toenail is also healing nicely and she can cover with a Band-Aid at this time. She will follow up on an as-needed basis and call if any issues should arise.    No orders of the defined types were placed in this encounter.         Note is dictated utilizing voice recognition software. Unfortunately this leads to occasional typographical errors. I apologize in advance if the situation occurs. If questions occur please do not hesitate to call our office.    Transcribed from ambient dictation for GERRI Ro DPM by Rosa Peña.  10/20/22   11:32 EDT    Patient or patient representative verbalized consent to the visit recording.  I have personally performed the services described in this document as transcribed by the above individual, and it is both accurate and complete.

## 2022-10-28 DIAGNOSIS — F51.01 PRIMARY INSOMNIA: ICD-10-CM

## 2022-10-29 RX ORDER — POTASSIUM CHLORIDE 1500 MG/1
TABLET, FILM COATED, EXTENDED RELEASE ORAL
Qty: 120 TABLET | Refills: 0 | Status: SHIPPED | OUTPATIENT
Start: 2022-10-29 | End: 2023-03-01

## 2022-10-31 RX ORDER — ZOLPIDEM TARTRATE 10 MG/1
10 TABLET ORAL NIGHTLY PRN
Qty: 30 TABLET | Refills: 1 | Status: SHIPPED | OUTPATIENT
Start: 2022-10-31 | End: 2022-11-28 | Stop reason: SDUPTHER

## 2022-11-28 ENCOUNTER — OFFICE VISIT (OUTPATIENT)
Dept: FAMILY MEDICINE CLINIC | Facility: CLINIC | Age: 72
End: 2022-11-28

## 2022-11-28 ENCOUNTER — TELEPHONE (OUTPATIENT)
Dept: FAMILY MEDICINE CLINIC | Facility: CLINIC | Age: 72
End: 2022-11-28

## 2022-11-28 VITALS
SYSTOLIC BLOOD PRESSURE: 132 MMHG | OXYGEN SATURATION: 100 % | DIASTOLIC BLOOD PRESSURE: 82 MMHG | BODY MASS INDEX: 27.46 KG/M2 | HEART RATE: 68 BPM | WEIGHT: 155 LBS | HEIGHT: 63 IN

## 2022-11-28 DIAGNOSIS — F51.01 PRIMARY INSOMNIA: ICD-10-CM

## 2022-11-28 DIAGNOSIS — Z12.31 SCREENING MAMMOGRAM FOR BREAST CANCER: Primary | ICD-10-CM

## 2022-11-28 DIAGNOSIS — J01.10 ACUTE NON-RECURRENT FRONTAL SINUSITIS: ICD-10-CM

## 2022-11-28 PROCEDURE — 99213 OFFICE O/P EST LOW 20 MIN: CPT | Performed by: NURSE PRACTITIONER

## 2022-11-28 RX ORDER — ZOLPIDEM TARTRATE 10 MG/1
10 TABLET ORAL NIGHTLY PRN
Qty: 30 TABLET | Refills: 1 | Status: SHIPPED | OUTPATIENT
Start: 2022-11-28 | End: 2023-01-26

## 2022-11-28 RX ORDER — DARIDOREXANT 50 MG/1
50 TABLET, FILM COATED ORAL NIGHTLY
Qty: 30 TABLET | Refills: 0 | Status: SHIPPED | OUTPATIENT
Start: 2022-11-28 | End: 2022-11-28

## 2022-11-28 RX ORDER — CEFDINIR 300 MG/1
300 CAPSULE ORAL 2 TIMES DAILY
Qty: 14 CAPSULE | Refills: 0 | Status: SHIPPED | OUTPATIENT
Start: 2022-11-28 | End: 2023-03-01

## 2022-11-28 NOTE — TELEPHONE ENCOUNTER
Caller: Krystyna Fabian    Relationship: Self    Best call back number: [phone]    What medications are you currently taking:   Current Outpatient Medications on File Prior to Visit   Medication Sig Dispense Refill   • Ascorbic Acid (VITAMIN C PO) Take  by mouth.     • baclofen (LIORESAL) 10 MG tablet Take 1 tablet by mouth Every Night. 90 tablet 2   • cefdinir (OMNICEF) 300 MG capsule Take 1 capsule by mouth 2 (Two) Times a Day. 14 capsule 0   • Daridorexant HCl (Quviviq) 50 MG tablet Take 1 tablet by mouth Every Night for 30 days. 30 tablet 0   • docusate sodium 100 MG capsule Take 100 mg by mouth.     • HYDROcodone-acetaminophen (NORCO) 7.5-325 MG per tablet Take 1 tablet by mouth Every 6 (Six) Hours As Needed for Moderate Pain (Pain). 28 tablet 0   • montelukast (SINGULAIR) 10 MG tablet TAKE 1 TABLET BY MOUTH EVERY NIGHT (Patient taking differently: Take 10 mg by mouth Daily.) 90 tablet 1   • Multiple Vitamins-Minerals (ZINC PO) Take  by mouth.     • potassium chloride ER (K-TAB) 20 MEQ tablet controlled-release ER tablet TAKE ONE TABLET BY MOUTH TWICE A DAY 60 tablet 2   • potassium chloride ER (K-TAB) 20 MEQ tablet controlled-release ER tablet TAKE 1 TABLET TWICE DAILY 120 tablet 0   • QUEtiapine (SEROquel) 50 MG tablet TAKE 1 TABLET AT BEDTIME 90 tablet 0   • triamcinolone (KENALOG) 0.1 % ointment      • Turmeric 500 MG capsule Take  by mouth.     • VITAMIN D, CHOLECALCIFEROL, PO Take 1 tablet by mouth Daily.     • zolpidem (AMBIEN) 10 MG tablet TAKE 1 TABLET BY MOUTH AT NIGHT AS NEEDED FOR SLEEP 30 tablet 1     No current facility-administered medications on file prior to visit.          When did you start taking these medications:     Which medication are you concerned about: ZOLPIDEM 10MG    Who prescribed you this medication:DR LOERA    What are your concerns: PATIENT WANTS TO BE CALLED TO DISCUSS THE AMBIEN AND AN ALTERNATIVE THAT SHE WAS SUGGESTED TO TAKE.     How long have you had  these concerns:

## 2022-11-28 NOTE — PROGRESS NOTES
"Chief Complaint  Insomnia (4 mo F/U.  Pt states no changes. She states she is still waking every 2-3 hrs. )    Subjective        Krystyna Fabian presents to [facility]  History of Present Illness    Patient presents for f/u visit.     Patient is c/o facial pain/pressure, congestion and drainage for approximately one week. She is also c/o headache with productive cough. Patient denies fever.  She is also c/o right sided neck pain for the last week as well.     Patient is taking Seroquel and Ambien nightly for insomnia.  She reports she is falling asleep without difficulty, waking every 2-3 hours.     Objective   Vital Signs:  /82 (BP Location: Left arm, Patient Position: Sitting, Cuff Size: Adult)   Pulse 68   Ht 160 cm (63\")   Wt 70.3 kg (155 lb)   SpO2 100%   BMI 27.46 kg/m²   Estimated body mass index is 27.46 kg/m² as calculated from the following:    Height as of this encounter: 160 cm (63\").    Weight as of this encounter: 70.3 kg (155 lb).          Physical Exam  Constitutional:       Appearance: Normal appearance.   HENT:      Head: Normocephalic.      Right Ear: Tympanic membrane normal.      Left Ear: Tympanic membrane normal.      Mouth/Throat:      Pharynx: Oropharynx is clear.   Cardiovascular:      Rate and Rhythm: Normal rate and regular rhythm.   Pulmonary:      Effort: Pulmonary effort is normal.      Breath sounds: Normal breath sounds.   Abdominal:      General: Abdomen is flat. Bowel sounds are normal.      Palpations: Abdomen is soft.   Musculoskeletal:         General: Normal range of motion.      Cervical back: Neck supple.      Right lower leg: No edema.      Left lower leg: No edema.   Skin:     General: Skin is warm and dry.   Neurological:      Mental Status: She is alert and oriented to person, place, and time.      Gait: Gait is intact.   Psychiatric:         Attention and Perception: Attention normal.         Mood and Affect: Mood normal.    "      Speech: Speech normal.        Result Review :    CMP    CMP 7/27/22 8/18/22 9/2/22   Glucose 78 76 75   BUN 11 6 (A) 10   Creatinine 0.90 0.82 0.79   Sodium 140 137 143   Potassium 2.9 (A) 3.6 3.2 (A)   Chloride 102 106 108 (A)   Calcium 9.1 8.8 8.7   (A) Abnormal value       Comments are available for some flowsheets but are not being displayed.           CBC    CBC 7/25/22 9/2/22 9/9/22   WBC 9.52 9.96    RBC 5.47 (A) 4.52    Hemoglobin 16.4 (A) 13.4 15.0   Hematocrit 48.6 (A) 40.6 44   MCV 88.8 89.8    MCH 30.0 29.6    MCHC 33.7 33.0    RDW 13.8 12.5    Platelets 313 271    (A) Abnormal value            Lipid Panel    Lipid Panel 2/18/22 7/25/22   Total Cholesterol 178 222 (A)   Triglycerides 438 (A) 85   HDL Cholesterol 43 56   VLDL Cholesterol 68 (A) 15   LDL Cholesterol  67 151 (A)   LDL/HDL Ratio 1.10 2.66   (A) Abnormal value            TSH    TSH 2/18/22 7/25/22   TSH 0.358 1.670                     Assessment and Plan   Diagnoses and all orders for this visit:    1. Screening mammogram for breast cancer (Primary)  -     Mammo Screening Digital Tomosynthesis Bilateral With CAD; Future    2. Primary insomnia  Assessment & Plan:  1. Trial Quviviq 50mg nightly, script sent to mail order pharmacy   2. Ambien to be discontinued  3. May continue Seroquel nightly    Orders:  -     Daridorexant HCl (Quviviq) 50 MG tablet; Take 1 tablet by mouth Every Night for 30 days.  Dispense: 30 tablet; Refill: 0    3. Acute non-recurrent frontal sinusitis  Assessment & Plan:  1. Cefdinir 300mg BID x 7 days  2. Call if symptoms persist or worsen      Other orders  -     cefdinir (OMNICEF) 300 MG capsule; Take 1 capsule by mouth 2 (Two) Times a Day.  Dispense: 14 capsule; Refill: 0         I spent 25 minutes caring for Krystyna on this date of service. This time includes time spent by me in the following activities:preparing for the visit, reviewing tests, obtaining and/or reviewing a separately obtained history, performing  a medically appropriate examination and/or evaluation , counseling and educating the patient/family/caregiver, ordering medications, tests, or procedures, documenting information in the medical record and care coordination  Follow Up   Return in about 3 months (around 2/28/2023) for Insomnia.  Patient was given instructions and counseling regarding her condition or for health maintenance advice. Please see specific information pulled into the AVS if appropriate.

## 2022-11-28 NOTE — ASSESSMENT & PLAN NOTE
1. Trial Quviviq 50mg nightly, script sent to mail order pharmacy   2. Ambien to be discontinued  3. May continue Seroquel nightly

## 2022-11-29 DIAGNOSIS — F51.01 PRIMARY INSOMNIA: ICD-10-CM

## 2022-11-29 RX ORDER — OMEPRAZOLE 40 MG/1
CAPSULE, DELAYED RELEASE ORAL
Qty: 180 CAPSULE | Refills: 1 | Status: SHIPPED | OUTPATIENT
Start: 2022-11-29 | End: 2023-03-01 | Stop reason: SDUPTHER

## 2022-11-29 RX ORDER — BACLOFEN 10 MG/1
10 TABLET ORAL NIGHTLY
Qty: 90 TABLET | Refills: 2 | Status: SHIPPED | OUTPATIENT
Start: 2022-11-29 | End: 2023-03-01 | Stop reason: SDUPTHER

## 2022-11-29 RX ORDER — QUETIAPINE FUMARATE 50 MG/1
TABLET, FILM COATED ORAL
Qty: 90 TABLET | Refills: 0 | Status: SHIPPED | OUTPATIENT
Start: 2022-11-29 | End: 2023-03-01 | Stop reason: SDUPTHER

## 2022-11-30 DIAGNOSIS — F51.01 PRIMARY INSOMNIA: ICD-10-CM

## 2022-11-30 RX ORDER — ZOLPIDEM TARTRATE 10 MG/1
10 TABLET ORAL NIGHTLY PRN
Qty: 30 TABLET | Refills: 1 | OUTPATIENT
Start: 2022-11-30

## 2023-01-26 DIAGNOSIS — F51.01 PRIMARY INSOMNIA: ICD-10-CM

## 2023-01-26 RX ORDER — ZOLPIDEM TARTRATE 10 MG/1
10 TABLET ORAL NIGHTLY PRN
Qty: 30 TABLET | Refills: 1 | Status: SHIPPED | OUTPATIENT
Start: 2023-01-26 | End: 2023-03-14

## 2023-02-09 ENCOUNTER — OFFICE VISIT (OUTPATIENT)
Dept: PODIATRY | Facility: CLINIC | Age: 73
End: 2023-02-09
Payer: COMMERCIAL

## 2023-02-09 VITALS — HEIGHT: 63 IN | BODY MASS INDEX: 27.46 KG/M2 | OXYGEN SATURATION: 100 % | WEIGHT: 155 LBS | HEART RATE: 65 BPM

## 2023-02-09 DIAGNOSIS — M79.671 BILATERAL FOOT PAIN: Primary | ICD-10-CM

## 2023-02-09 DIAGNOSIS — M21.6X2 EQUINUS DEFORMITY OF BOTH FEET: ICD-10-CM

## 2023-02-09 DIAGNOSIS — M77.42 METATARSALGIA OF BOTH FEET: ICD-10-CM

## 2023-02-09 DIAGNOSIS — M20.42 HAMMER TOES, BILATERAL: ICD-10-CM

## 2023-02-09 DIAGNOSIS — M21.6X1 EQUINUS DEFORMITY OF BOTH FEET: ICD-10-CM

## 2023-02-09 DIAGNOSIS — M20.41 HAMMER TOES, BILATERAL: ICD-10-CM

## 2023-02-09 DIAGNOSIS — M77.41 METATARSALGIA OF BOTH FEET: ICD-10-CM

## 2023-02-09 DIAGNOSIS — M79.672 BILATERAL FOOT PAIN: Primary | ICD-10-CM

## 2023-02-09 PROCEDURE — 99213 OFFICE O/P EST LOW 20 MIN: CPT | Performed by: PODIATRIST

## 2023-02-09 RX ORDER — IMIQUIMOD 12.5 MG/.25G
CREAM TOPICAL
COMMUNITY
Start: 2023-02-08

## 2023-02-09 NOTE — PROGRESS NOTES
"02/09/2023  Foot and Ankle Surgery - Established Patient/Follow-up  Provider: Dr. Reginald Ro DPM  Location: [facility]    Subjective:  Krystyna Fabian is a 73 y.o. female.     Chief Complaint   Patient presents with   • Right Foot - Pain   • Left Foot - Pain   • Follow-up     OEBD Lugo APRN 11/28/2022       HPI: The patient is a 73-year-old female who returns for a new issue involving bilateral foot pain.    She reports that approximately 3 months ago, she was unable to ambulate secondary to the plantar aspect of her feet being sore and tender. Approximately 1 month ago, she began to experience burning and stinging sensations in her arches. For approximately 2 months, she has experienced numbness in all of her toes. Additionally, she notes a \"tag\" on her foot (laterality not specified). She states that occasionally when she gets up, she almost falls on her feet. The patient continues to perform her stretching exercises daily. She states she sits on a computer 3 days per week for 11 to 8 hours. She notes that the entire month of 10/2022, 11/2022, and 12/2022, she was sitting on a computer 7 hours per week. The patient continues to utilize inserts in her shoes, which are several years old. She states that she has a pair of [employer] Rac[employer] shoes that she wears. She denies any recent injuries.      Allergies   Allergen Reactions   • Butorphanol Itching     Stadol  (felt like \"bugs were crawling all over me\")   • Erythromycin Photosensitivity, Other (See Comments) and Headache     Caused elevated BP         Current Outpatient Medications on File Prior to Visit   Medication Sig Dispense Refill   • Ascorbic Acid (VITAMIN C PO) Take  by mouth.     • baclofen (LIORESAL) 10 MG tablet TAKE 1 TABLET BY MOUTH EVERY NIGHT. 90 tablet 2   • cefdinir (OMNICEF) 300 MG capsule Take 1 capsule by mouth 2 (Two) Times a Day. 14 capsule 0   • docusate sodium 100 MG capsule Take 100 mg by mouth.     • " "HYDROcodone-acetaminophen (NORCO) 7.5-325 MG per tablet Take 1 tablet by mouth Every 6 (Six) Hours As Needed for Moderate Pain (Pain). 28 tablet 0   • montelukast (SINGULAIR) 10 MG tablet TAKE 1 TABLET BY MOUTH EVERY NIGHT (Patient taking differently: Take 10 mg by mouth Daily.) 90 tablet 1   • Multiple Vitamins-Minerals (ZINC PO) Take  by mouth.     • mupirocin (BACTROBAN) 2 % ointment APPLY TO TREATED AREAS TWICE DAILY     • omeprazole (priLOSEC) 40 MG capsule TAKE 1 CAPSULE TWICE DAILY 180 capsule 1   • potassium chloride ER (K-TAB) 20 MEQ tablet controlled-release ER tablet TAKE ONE TABLET BY MOUTH TWICE A DAY 60 tablet 2   • potassium chloride ER (K-TAB) 20 MEQ tablet controlled-release ER tablet TAKE 1 TABLET TWICE DAILY 120 tablet 0   • QUEtiapine (SEROquel) 50 MG tablet TAKE 1 TABLET AT BEDTIME 90 tablet 0   • triamcinolone (KENALOG) 0.1 % ointment      • Turmeric 500 MG capsule Take  by mouth.     • VITAMIN D, CHOLECALCIFEROL, PO Take 1 tablet by mouth Daily.     • zolpidem (AMBIEN) 10 MG tablet TAKE 1 TABLET BY MOUTH AT NIGHT AS NEEDED FOR SLEEP 30 tablet 1   • imiquimod (ALDARA) 5 % cream        No current facility-administered medications on file prior to visit.       Objective   Pulse 65   Ht 160 cm (63\")   Wt 70.3 kg (155 lb)   SpO2 100%   BMI 27.46 kg/m²     Foot/Ankle Exam:       General:   Appearance: appears stated age and healthy    Orientation: AAOx3    Affect: appropriate    Gait: antalgic      VASCULAR      Left Foot Vascularity   Normal vascular exam    Dorsalis pedis:  2+  Posterior tibial:  2+  Skin Temperature: warm    Edema Grading:  None  CFT:  < 3 seconds  Pedal Hair Growth:  Present  Varicosities: none        NEUROLOGIC     Left Foot Neurologic   Light touch sensation:  Normal  Hot/cold sensation: normal    Achilles reflex:  2+     MUSCULOSKELETAL      Left Foot Musculoskeletal   Ecchymosis:  None  Tenderness: plantar fascia and plantar heel    Arch:  Normal     MUSCLE STRENGTH     " Left Foot Muscle Strength   Normal strength    Foot dorsiflexion:  5  Foot plantar flexion:  5  Foot inversion:  5  Foot eversion:  5     DERMATOLOGIC     Left Foot Dermatologic   Skin: skin intact        Right Foot Additional Comments 02/09/2023 Semi-reducible contracture involving the lesser digits of both feet. Discomfort to the lesser metatarsophalangeal joints. Moderate soft tissue rigidity and equinus contracture.      Left Foot Additional Comments: Discomfort with palpation involving the plantar medial calcaneal tuberosity.  No gross deformity or instability.  Moderate equinus contracture with knee extended and flexed.    08/29/2022  Continued pain with palpation involving the plantar medial calcaneal tuberosity of the left heel.   Residual nail growth to the left great toe with moderate thickness and dystrophy.   Mild discomfort with palpation.   No signs of infection.    10/20/2022  No significant pain involving the plantar aspect of the calcaneus. The nailbed is healing nicely with granulation tissue. No signs of infection or inflammation.    09/22/2022  Incision sites are dry and stable with intact sutures. Hallux nailbed is stable without signs of infection.      Assessment & Plan   Diagnoses and all orders for this visit:    1. Bilateral foot pain (Primary)  -     XR Foot 3+ View Bilateral    2. Metatarsalgia of both feet    3. Equinus deformity of both feet    4. Hammer toes, bilateral      The patient is a 73-year-old female who presents to the office today for a new issue involving bilateral foot pain. X-rays of her bilateral feet, taken today, were reviewed with the patient. The etiology and biomechanics involved with her bilateral foot pain were discussed. She is advised to continue daily stretching exercises with a tennis ball and massage ball daily. She is advised to wear supportive shoes with inserts, even around the house. She is advised to avoid ambulating barefoot, wearing flip flops or  sandals, as well as avoidance of wearing flip flops or sandals. I advised to keep her feet moisturized with a good moisturizer like Aquafor. Crest pads were recommended to place over the second and third toes. Surgical intervention may be considered if her symptoms persist. The patient will return to the office in 6 weeks for reevaluation.     Greater than 20 minutes was spent before, during, and after evaluation for patient care.      Orders Placed This Encounter   Procedures   • XR Foot 3+ View Bilateral     Order Specific Question:   Reason for Exam:     Answer:   joshua foot pain in arch of both feet and heels x 3 months, no known injury    room 14    wb     Order Specific Question:   Does this patient have a diabetic monitoring/medication delivering device on?     Answer:   No     Order Specific Question:   Release to patient     Answer:   Routine Release          Note is dictated utilizing voice recognition software. Unfortunately this leads to occasional typographical errors. I apologize in advance if the situation occurs. If questions occur please do not hesitate to call our office.    Transcribed from ambient dictation for GERRI Ro DPM by Amy Hu.  02/09/23   10:31 EST    Patient or patient representative verbalized consent to the visit recording.  I have personally performed the services described in this document as transcribed by the above individual, and it is both accurate and complete.

## 2023-03-01 ENCOUNTER — OFFICE VISIT (OUTPATIENT)
Dept: FAMILY MEDICINE CLINIC | Facility: CLINIC | Age: 73
End: 2023-03-01
Payer: COMMERCIAL

## 2023-03-01 VITALS
HEIGHT: 63 IN | DIASTOLIC BLOOD PRESSURE: 70 MMHG | BODY MASS INDEX: 30.12 KG/M2 | OXYGEN SATURATION: 99 % | WEIGHT: 170 LBS | SYSTOLIC BLOOD PRESSURE: 118 MMHG | HEART RATE: 64 BPM

## 2023-03-01 DIAGNOSIS — F51.01 PRIMARY INSOMNIA: ICD-10-CM

## 2023-03-01 DIAGNOSIS — E87.6 HYPOKALEMIA: Primary | ICD-10-CM

## 2023-03-01 DIAGNOSIS — K21.9 GASTROESOPHAGEAL REFLUX DISEASE WITHOUT ESOPHAGITIS: ICD-10-CM

## 2023-03-01 DIAGNOSIS — Z00.00 PREVENTATIVE HEALTH CARE: ICD-10-CM

## 2023-03-01 DIAGNOSIS — J01.10 ACUTE NON-RECURRENT FRONTAL SINUSITIS: ICD-10-CM

## 2023-03-01 PROCEDURE — 99214 OFFICE O/P EST MOD 30 MIN: CPT | Performed by: NURSE PRACTITIONER

## 2023-03-01 PROCEDURE — 36415 COLL VENOUS BLD VENIPUNCTURE: CPT | Performed by: NURSE PRACTITIONER

## 2023-03-01 PROCEDURE — 80048 BASIC METABOLIC PNL TOTAL CA: CPT | Performed by: NURSE PRACTITIONER

## 2023-03-01 RX ORDER — POTASSIUM CHLORIDE 1500 MG/1
20 TABLET, FILM COATED, EXTENDED RELEASE ORAL 2 TIMES DAILY
Qty: 180 TABLET | Refills: 2 | Status: SHIPPED | OUTPATIENT
Start: 2023-03-01 | End: 2023-05-30

## 2023-03-01 RX ORDER — OMEPRAZOLE 40 MG/1
40 CAPSULE, DELAYED RELEASE ORAL 2 TIMES DAILY
Qty: 180 CAPSULE | Refills: 1 | Status: SHIPPED | OUTPATIENT
Start: 2023-03-01

## 2023-03-01 RX ORDER — SUVOREXANT 10 MG/1
1 TABLET, FILM COATED ORAL NIGHTLY PRN
Qty: 3 TABLET | Refills: 0 | COMMUNITY
Start: 2023-03-01 | End: 2023-03-14

## 2023-03-01 RX ORDER — BACLOFEN 10 MG/1
10 TABLET ORAL NIGHTLY
Qty: 90 TABLET | Refills: 2 | Status: SHIPPED | OUTPATIENT
Start: 2023-03-01

## 2023-03-01 RX ORDER — AMOXICILLIN 500 MG/1
500 CAPSULE ORAL 2 TIMES DAILY
Qty: 20 CAPSULE | Refills: 0 | Status: SHIPPED | OUTPATIENT
Start: 2023-03-01 | End: 2023-03-11

## 2023-03-01 RX ORDER — MONTELUKAST SODIUM 10 MG/1
10 TABLET ORAL NIGHTLY
Qty: 90 TABLET | Refills: 1 | Status: SHIPPED | OUTPATIENT
Start: 2023-03-01

## 2023-03-01 RX ORDER — QUETIAPINE FUMARATE 50 MG/1
50 TABLET, FILM COATED ORAL
Qty: 90 TABLET | Refills: 2 | Status: SHIPPED | OUTPATIENT
Start: 2023-03-01

## 2023-03-01 NOTE — ASSESSMENT & PLAN NOTE
1.  Trial Belsomra 10 mg nightly x3 doses and call with results  2.  Hold Ambien during this time  3.  Consider Restoril if ineffective  4.  Continue Seroquel 50 mg nightly

## 2023-03-01 NOTE — PROGRESS NOTES
Venipuncture Blood Specimen Collection  Venipuncture performed in the left hand by Yadira Butler MA with good hemostasis. Patient tolerated the procedure well without complications.   03/01/23   Yadira Butler MA

## 2023-03-01 NOTE — PROGRESS NOTES
"Chief Complaint  Follow-up (3 month follow up insomnia) and Earache (Right ear pain started yesterday with some drainage from the ear )    Subjective        Krystyna Fabian presents to [facility]  History of Present Illness    Patient presents for f/u visit.     Patient c/o right ear pain, sinus pressure, productive cough and nasal congestion. Symptoms started approximately 5 days ago. Patient denies fever, chest pain or dyspnea.     Patient has insomnia, taking Seroquel and Ambien nightly for insomnia.  She reports difficulty falling asleep, taking up to two hours to get to sleep. When patient falls asleep, she is up twice nightly to void. Patient getting approximately 6-7 hours of sleep total. She has muscle spasms nightly to feet/legs, stable on Baclofen 10 mg nightly.     Patient is taking Prilosec 40 mg BID for GERD. She reports symptoms are stable, has no acute complaints.     Patient has history of hypokalemia, taking K-dur 20 mEq BID.     Mammogram completed, showed no signs of malignancy. Colonoscopy completed in 2018 in [place], [place]. She was advised to repeat in 10 years.     Objective   Vital Signs:  /70 (BP Location: Left arm, Patient Position: Sitting, Cuff Size: Adult)   Pulse 64   Ht 160 cm (63\")   Wt 77.1 kg (170 lb)   SpO2 99%   BMI 30.11 kg/m²   Estimated body mass index is 30.11 kg/m² as calculated from the following:    Height as of this encounter: 160 cm (63\").    Weight as of this encounter: 77.1 kg (170 lb).       BMI is >= 25 and <30. (Overweight) The following options were offered after discussion;: exercise counseling/recommendations and nutrition counseling/recommendations      Physical Exam  Constitutional:       Appearance: Normal appearance.   HENT:      Head: Normocephalic.      Right Ear: Swelling present.      Left Ear: Tympanic membrane normal.      Mouth/Throat:      Pharynx: Posterior oropharyngeal erythema present.   Cardiovascular: "      Rate and Rhythm: Normal rate and regular rhythm.   Pulmonary:      Effort: Pulmonary effort is normal.      Breath sounds: Normal breath sounds.   Abdominal:      General: Abdomen is flat. Bowel sounds are normal.      Palpations: Abdomen is soft.   Musculoskeletal:         General: Normal range of motion.      Cervical back: Neck supple.      Right lower leg: No edema.      Left lower leg: No edema.   Skin:     General: Skin is warm and dry.   Neurological:      Mental Status: She is alert and oriented to person, place, and time.      Gait: Gait is intact.   Psychiatric:         Attention and Perception: Attention normal.         Mood and Affect: Mood normal.         Speech: Speech normal.        Result Review :    CMP    CMP 7/27/22 8/18/22 9/2/22   Glucose 78 76 75   BUN 11 6 (A) 10   Creatinine 0.90 0.82 0.79   eGFR 68.9 77.1 80.6   Sodium 140 137 143   Potassium 2.9 (A) 3.6 3.2 (A)   Chloride 102 106 108 (A)   Calcium 9.1 8.8 8.7   BUN/Creatinine Ratio 12.2 7.3 12.7   Anion Gap 12.0 8.4 13.0   (A) Abnormal value       Comments are available for some flowsheets but are not being displayed.           CBC    CBC 7/25/22 9/2/22 9/9/22   WBC 9.52 9.96    RBC 5.47 (A) 4.52    Hemoglobin 16.4 (A) 13.4 15.0   Hematocrit 48.6 (A) 40.6 44   MCV 88.8 89.8    MCH 30.0 29.6    MCHC 33.7 33.0    RDW 13.8 12.5    Platelets 313 271    (A) Abnormal value            Lipid Panel    Lipid Panel 7/25/22   Total Cholesterol 222 (A)   Triglycerides 85   HDL Cholesterol 56   VLDL Cholesterol 15   LDL Cholesterol  151 (A)   LDL/HDL Ratio 2.66   (A) Abnormal value            TSH    TSH 7/25/22   TSH 1.670                            Assessment and Plan   Diagnoses and all orders for this visit:    1. Hypokalemia (Primary)  Assessment & Plan:  1.  Continue K-Dur 20 mEq twice daily  2.  BMP today    Orders:  -     Basic Metabolic Panel    2. Primary insomnia  Assessment & Plan:  1.  Trial Belsomra 10 mg nightly x3 doses and call with  results  2.  Hold Ambien during this time  3.  Consider Restoril if ineffective  4.  Continue Seroquel 50 mg nightly      Orders:  -     QUEtiapine (SEROquel) 50 MG tablet; Take 1 tablet by mouth every night at bedtime.  Dispense: 90 tablet; Refill: 2  -     Suvorexant (Belsomra) 10 MG tablet; Take 1 tablet by mouth At Night As Needed (insomnia).  Dispense: 3 tablet; Refill: 0    3. Preventative health care  Assessment & Plan:  1.  Mammogram up-to-date  2.  Colonoscopy due in 2028      4. Gastroesophageal reflux disease without esophagitis  Assessment & Plan:  1.  Stable on omeprazole 40 milligrams twice daily      5. Acute non-recurrent frontal sinusitis  Assessment & Plan:  1.  Amoxicillin 500 mg twice daily x10 days      Other orders  -     baclofen (LIORESAL) 10 MG tablet; Take 1 tablet by mouth Every Night.  Dispense: 90 tablet; Refill: 2  -     omeprazole (priLOSEC) 40 MG capsule; Take 1 capsule by mouth 2 (Two) Times a Day.  Dispense: 180 capsule; Refill: 1  -     montelukast (SINGULAIR) 10 MG tablet; Take 1 tablet by mouth Every Night.  Dispense: 90 tablet; Refill: 1  -     potassium chloride ER (K-TAB) 20 MEQ tablet controlled-release ER tablet; Take 1 tablet by mouth 2 (Two) Times a Day for 90 days.  Dispense: 180 tablet; Refill: 2  -     amoxicillin (AMOXIL) 500 MG capsule; Take 1 capsule by mouth 2 (Two) Times a Day for 10 days.  Dispense: 20 capsule; Refill: 0         I spent 30 minutes caring for Krystyna on this date of service. This time includes time spent by me in the following activities:preparing for the visit, reviewing tests, obtaining and/or reviewing a separately obtained history, performing a medically appropriate examination and/or evaluation , counseling and educating the patient/family/caregiver, ordering medications, tests, or procedures, documenting information in the medical record, independently interpreting results and communicating that information with the patient/family/caregiver  and care coordination  Follow Up   Return in about 4 months (around 7/1/2023) for Insomnia.  Patient was given instructions and counseling regarding her condition or for health maintenance advice. Please see specific information pulled into the AVS if appropriate.

## 2023-03-02 LAB
ANION GAP SERPL CALCULATED.3IONS-SCNC: 11.9 MMOL/L (ref 5–15)
BUN SERPL-MCNC: 6 MG/DL (ref 8–23)
BUN/CREAT SERPL: 6.1 (ref 7–25)
CALCIUM SPEC-SCNC: 9.2 MG/DL (ref 8.6–10.5)
CHLORIDE SERPL-SCNC: 110 MMOL/L (ref 98–107)
CO2 SERPL-SCNC: 21.1 MMOL/L (ref 22–29)
CREAT SERPL-MCNC: 0.99 MG/DL (ref 0.57–1)
EGFRCR SERPLBLD CKD-EPI 2021: 61.1 ML/MIN/1.73
GLUCOSE SERPL-MCNC: 79 MG/DL (ref 65–99)
POTASSIUM SERPL-SCNC: 4.1 MMOL/L (ref 3.5–5.2)
SODIUM SERPL-SCNC: 143 MMOL/L (ref 136–145)

## 2023-03-14 ENCOUNTER — TELEPHONE (OUTPATIENT)
Dept: FAMILY MEDICINE CLINIC | Facility: CLINIC | Age: 73
End: 2023-03-14
Payer: COMMERCIAL

## 2023-03-14 DIAGNOSIS — F51.01 PRIMARY INSOMNIA: Primary | ICD-10-CM

## 2023-03-14 RX ORDER — TEMAZEPAM 7.5 MG/1
7.5 CAPSULE ORAL NIGHTLY PRN
Qty: 30 CAPSULE | Refills: 0 | Status: SHIPPED | OUTPATIENT
Start: 2023-03-14 | End: 2023-03-21

## 2023-03-14 NOTE — TELEPHONE ENCOUNTER
Pt notified and expressed understanding. She asked to have just a small amount called in for her to try to see how it works for her. She said she will try it over the weekend to see if it helps. She will let us know.

## 2023-03-14 NOTE — TELEPHONE ENCOUNTER
Caller: Krystyna Fabian    Relationship to patient: Self    Best call back number: [phone]    Patient is needing: PATIENT STATES [facility]ERRY PRESCRIBED EHR SOMETHING TO HELP HER SLEEP AND TO CALL BACK WITH AN UPDATE ON IF IT WORKED OR NOT. PATIENT STATES IT DID NOT WORK AND SHE WAS UP ALL NIGHT. REQUESTING A CALLBACK WITH NEXT STEPS.

## 2023-03-14 NOTE — TELEPHONE ENCOUNTER
Discontinue Belsomra.  Restoril is another option to try.  Again, this would be instead of her Ambien

## 2023-03-20 DIAGNOSIS — F51.01 PRIMARY INSOMNIA: ICD-10-CM

## 2023-03-20 NOTE — TELEPHONE ENCOUNTER
Caller: FabianKrystyna    Relationship: Self    Best call back number: *[phone] (Mobile)    What medication are you requesting:   zolpidem (AMBIEN) 10 MG tablet       What are your current symptoms: REPLACE THE RESTORIL WHICH DID NOT WORK FOR HER       Have you had these symptoms before:    [x] Yes  [] No    Have you been treated for these symptoms before:   [x] Yes  [] No    If a prescription is needed, what is your preferred pharmacy and phone number: [facility] #[id number] [facility] [address] AT [facility] [phone] [facility] [phone] FX     Additional notes:

## 2023-03-21 RX ORDER — ZOLPIDEM TARTRATE 10 MG/1
10 TABLET ORAL NIGHTLY PRN
Qty: 30 TABLET | Refills: 1 | Status: SHIPPED | OUTPATIENT
Start: 2023-03-21

## 2023-04-13 ENCOUNTER — TELEPHONE (OUTPATIENT)
Dept: FAMILY MEDICINE CLINIC | Facility: CLINIC | Age: 73
End: 2023-04-13
Payer: COMMERCIAL

## 2023-04-13 ENCOUNTER — CLINICAL SUPPORT (OUTPATIENT)
Dept: FAMILY MEDICINE CLINIC | Facility: CLINIC | Age: 73
End: 2023-04-13
Payer: COMMERCIAL

## 2023-04-13 DIAGNOSIS — R30.0 DYSURIA: Primary | ICD-10-CM

## 2023-04-13 LAB
BILIRUB BLD-MCNC: NEGATIVE MG/DL
CLARITY, POC: ABNORMAL
COLOR UR: YELLOW
GLUCOSE UR STRIP-MCNC: NEGATIVE MG/DL
KETONES UR QL: NEGATIVE
LEUKOCYTE EST, POC: ABNORMAL
NITRITE UR-MCNC: NEGATIVE MG/ML
PH UR: 5 [PH] (ref 5–8)
PROT UR STRIP-MCNC: ABNORMAL MG/DL
RBC # UR STRIP: ABNORMAL /UL
SP GR UR: 1.01 (ref 1–1.03)
UROBILINOGEN UR QL: NORMAL

## 2023-04-13 PROCEDURE — 87086 URINE CULTURE/COLONY COUNT: CPT | Performed by: NURSE PRACTITIONER

## 2023-04-13 RX ORDER — NITROFURANTOIN 25; 75 MG/1; MG/1
100 CAPSULE ORAL 2 TIMES DAILY
Qty: 14 CAPSULE | Refills: 0 | Status: SHIPPED | OUTPATIENT
Start: 2023-04-13

## 2023-04-13 NOTE — PROGRESS NOTES
Sent for culture.  Based on blood, protein and leukocytes I am sending antibiotics to [facility] for her to go ahead and start today.  We will notify her of culture results in the event we need to change antibiotics

## 2023-04-13 NOTE — TELEPHONE ENCOUNTER
Caller: FabianKrystyna    Relationship: Self    Best call back number: [phone]     What medication are you requesting: ANTIBIOTICS    What are your current symptoms: FREQUENT/PAINFUL URINATION     How long have you been experiencing symptoms: 3 DAYS     Have you had these symptoms before:    [x] Yes  [] No    Have you been treated for these symptoms before:   [x] Yes  [] No    If a prescription is needed, what is your preferred pharmacy and phone number:  [facility] #[id number] [facility] [address] AT [facility] [phone] [facility] [phone]         Additional notes: PATIENT THINKS THAT SHE MIGHT HAVE A UTI

## 2023-04-13 NOTE — PROGRESS NOTES
Pt came into the office to leave a urine sample because she is having UTI symptoms. Urine sample was collected and results given to provider.    KIARA Strauss

## 2023-04-14 LAB — BACTERIA SPEC AEROBE CULT: NO GROWTH

## 2023-05-05 ENCOUNTER — CLINICAL SUPPORT (OUTPATIENT)
Dept: FAMILY MEDICINE CLINIC | Facility: CLINIC | Age: 73
End: 2023-05-05
Payer: COMMERCIAL

## 2023-05-05 ENCOUNTER — TELEPHONE (OUTPATIENT)
Dept: FAMILY MEDICINE CLINIC | Facility: CLINIC | Age: 73
End: 2023-05-05
Payer: COMMERCIAL

## 2023-05-05 DIAGNOSIS — R30.9 PAIN WITH URINATION: Primary | ICD-10-CM

## 2023-05-05 LAB
BILIRUB BLD-MCNC: NEGATIVE MG/DL
CLARITY, POC: ABNORMAL
COLOR UR: YELLOW
GLUCOSE UR STRIP-MCNC: NEGATIVE MG/DL
KETONES UR QL: NEGATIVE
LEUKOCYTE EST, POC: ABNORMAL
NITRITE UR-MCNC: NEGATIVE MG/ML
PH UR: 6 [PH] (ref 5–8)
PROT UR STRIP-MCNC: ABNORMAL MG/DL
RBC # UR STRIP: ABNORMAL /UL
SP GR UR: 1.01 (ref 1–1.03)
UROBILINOGEN UR QL: ABNORMAL

## 2023-05-05 PROCEDURE — 87086 URINE CULTURE/COLONY COUNT: CPT | Performed by: NURSE PRACTITIONER

## 2023-05-05 PROCEDURE — 81001 URINALYSIS AUTO W/SCOPE: CPT | Performed by: NURSE PRACTITIONER

## 2023-05-05 RX ORDER — CEPHALEXIN 500 MG/1
500 CAPSULE ORAL 2 TIMES DAILY
Qty: 14 CAPSULE | Refills: 0 | Status: SHIPPED | OUTPATIENT
Start: 2023-05-05

## 2023-05-05 NOTE — PROGRESS NOTES
Keflex sent to patient's local pharmacy instead of mail order so she can start prescription sooner.  Push water, limit caffeine and carbonation

## 2023-05-05 NOTE — TELEPHONE ENCOUNTER
PATIENT STATES LIBBY TREATED HER FOR A UTI ABOUT 4 WKS AGO AND SHE WAS BETTER, HOWEVER YESTERDAY SHE NOTICED THE SYMPTOMS WERE BACK AND IT HAS ONLY GOTTEN WORSE SINCE. SHE IS EXPERIENCING BURNING, FREQ URINATION AND  PRESSURE. PATIENT IS REQUESTING THAT LIBBY PLACE AN ORDER FOR AN URINALYSIS AND ALLOW HER TO DROP OFF A SAMPLE. IF IT IS  POSITIVE CAN WE CALL SOMETHING IN FOR HER TO  TODAY SO SHE DOESN'T GO ALL WEEKEND NOT MEDICATED AND MISERABLE...    [id number] - [facility] [address] [place] [address] AT [facility] - [phone] [facility] [phone]   [phone]

## 2023-05-05 NOTE — PROGRESS NOTES
Patient came in for urine dip due to pain with urination, frequent urination, and urinary pressure. Urine was dipped and results were sent to provider, urine was sent for culture

## 2023-05-06 LAB
BACTERIA UR QL AUTO: ABNORMAL /HPF
BILIRUB UR QL STRIP: NEGATIVE
CLARITY UR: ABNORMAL
COLOR UR: YELLOW
GLUCOSE UR STRIP-MCNC: NEGATIVE MG/DL
HGB UR QL STRIP.AUTO: ABNORMAL
HYALINE CASTS UR QL AUTO: ABNORMAL /LPF
KETONES UR QL STRIP: NEGATIVE
LEUKOCYTE ESTERASE UR QL STRIP.AUTO: ABNORMAL
NITRITE UR QL STRIP: NEGATIVE
PH UR STRIP.AUTO: 6 [PH] (ref 5–8)
PROT UR QL STRIP: ABNORMAL
RBC # UR STRIP: ABNORMAL /HPF
REF LAB TEST METHOD: ABNORMAL
SP GR UR STRIP: 1.01 (ref 1–1.03)
SQUAMOUS #/AREA URNS HPF: ABNORMAL /HPF
UROBILINOGEN UR QL STRIP: ABNORMAL
WBC # UR STRIP: ABNORMAL /HPF

## 2023-05-07 LAB — BACTERIA SPEC AEROBE CULT: NO GROWTH

## 2023-05-16 DIAGNOSIS — F51.01 PRIMARY INSOMNIA: ICD-10-CM

## 2023-05-16 RX ORDER — ZOLPIDEM TARTRATE 10 MG/1
10 TABLET ORAL NIGHTLY PRN
Qty: 30 TABLET | Refills: 0 | Status: SHIPPED | OUTPATIENT
Start: 2023-05-16

## 2023-06-14 DIAGNOSIS — F51.01 PRIMARY INSOMNIA: ICD-10-CM

## 2023-06-14 RX ORDER — ZOLPIDEM TARTRATE 10 MG/1
10 TABLET ORAL NIGHTLY PRN
Qty: 30 TABLET | Refills: 1 | Status: SHIPPED | OUTPATIENT
Start: 2023-06-14

## 2023-08-09 DIAGNOSIS — F51.01 PRIMARY INSOMNIA: ICD-10-CM

## 2023-08-09 RX ORDER — ZOLPIDEM TARTRATE 10 MG/1
10 TABLET ORAL NIGHTLY PRN
Qty: 30 TABLET | Refills: 0 | Status: SHIPPED | OUTPATIENT
Start: 2023-08-09

## 2023-09-07 DIAGNOSIS — F51.01 PRIMARY INSOMNIA: ICD-10-CM

## 2023-09-07 RX ORDER — ZOLPIDEM TARTRATE 10 MG/1
10 TABLET ORAL NIGHTLY PRN
Qty: 30 TABLET | Refills: 0 | Status: SHIPPED | OUTPATIENT
Start: 2023-09-07

## 2023-09-29 ENCOUNTER — OFFICE VISIT (OUTPATIENT)
Dept: FAMILY MEDICINE CLINIC | Facility: CLINIC | Age: 73
End: 2023-09-29
Payer: COMMERCIAL

## 2023-09-29 VITALS
HEART RATE: 66 BPM | DIASTOLIC BLOOD PRESSURE: 80 MMHG | BODY MASS INDEX: 29.95 KG/M2 | OXYGEN SATURATION: 100 % | WEIGHT: 169 LBS | HEIGHT: 63 IN | SYSTOLIC BLOOD PRESSURE: 138 MMHG

## 2023-09-29 DIAGNOSIS — K21.9 GASTROESOPHAGEAL REFLUX DISEASE WITHOUT ESOPHAGITIS: ICD-10-CM

## 2023-09-29 DIAGNOSIS — Z00.00 PREVENTATIVE HEALTH CARE: ICD-10-CM

## 2023-09-29 DIAGNOSIS — E87.6 HYPOKALEMIA: Primary | ICD-10-CM

## 2023-09-29 DIAGNOSIS — F51.01 PRIMARY INSOMNIA: ICD-10-CM

## 2023-09-29 LAB
DEPRECATED RDW RBC AUTO: 43.5 FL (ref 37–54)
ERYTHROCYTE [DISTWIDTH] IN BLOOD BY AUTOMATED COUNT: 13.5 % (ref 12.3–15.4)
HCT VFR BLD AUTO: 41.9 % (ref 34–46.6)
HGB BLD-MCNC: 13.9 G/DL (ref 12–15.9)
MCH RBC QN AUTO: 29.6 PG (ref 26.6–33)
MCHC RBC AUTO-ENTMCNC: 33.2 G/DL (ref 31.5–35.7)
MCV RBC AUTO: 89.3 FL (ref 79–97)
PLATELET # BLD AUTO: 283 10*3/MM3 (ref 140–450)
PMV BLD AUTO: 11.9 FL (ref 6–12)
RBC # BLD AUTO: 4.69 10*6/MM3 (ref 3.77–5.28)
WBC NRBC COR # BLD: 8.41 10*3/MM3 (ref 3.4–10.8)

## 2023-09-29 PROCEDURE — 80061 LIPID PANEL: CPT | Performed by: NURSE PRACTITIONER

## 2023-09-29 PROCEDURE — 83036 HEMOGLOBIN GLYCOSYLATED A1C: CPT | Performed by: NURSE PRACTITIONER

## 2023-09-29 PROCEDURE — 80050 GENERAL HEALTH PANEL: CPT | Performed by: NURSE PRACTITIONER

## 2023-09-29 RX ORDER — OMEPRAZOLE 40 MG/1
40 CAPSULE, DELAYED RELEASE ORAL 2 TIMES DAILY
Qty: 180 CAPSULE | Refills: 1 | Status: SHIPPED | OUTPATIENT
Start: 2023-09-29

## 2023-09-29 RX ORDER — QUETIAPINE FUMARATE 50 MG/1
100 TABLET, FILM COATED ORAL
Qty: 180 TABLET | Refills: 1 | Status: SHIPPED | OUTPATIENT
Start: 2023-09-29 | End: 2023-12-28

## 2023-09-29 RX ORDER — MONTELUKAST SODIUM 10 MG/1
10 TABLET ORAL NIGHTLY
Qty: 90 TABLET | Refills: 1 | Status: SHIPPED | OUTPATIENT
Start: 2023-09-29

## 2023-09-29 NOTE — PROGRESS NOTES
"Chief Complaint  Follow-up (4 month follow up insomnia )    Subjective        Krystyna Fabian presents to [facility]  History of Present Illness    Patient presents for follow-up visit.    Patient has insomnia, taking Seroquel 50 mg nightly and Ambien 10 mg nightly for insomnia.  She reports difficulty falling asleep, taking up to two hours to get to sleep. When patient falls asleep, she is up twice nightly to void. Patient getting approximately 6-7 hours of sleep total. Belsomra tried and failed. She has muscle spasms nightly to feet/legs, stable on Baclofen 10 mg nightly.      Patient is taking Prilosec 40 mg BID for GERD. She reports symptoms are stable, has no acute complaints.      Patient has history of hypokalemia, taking K-dur 20 mEq BID.     Objective   Vital Signs:  /80 (BP Location: Left arm, Patient Position: Sitting, Cuff Size: Adult)   Pulse 66   Ht 160 cm (63\")   Wt 76.7 kg (169 lb)   SpO2 100%   BMI 29.94 kg/m²   Estimated body mass index is 29.94 kg/m² as calculated from the following:    Height as of this encounter: 160 cm (63\").    Weight as of this encounter: 76.7 kg (169 lb).           Physical Exam  Constitutional:       Appearance: Normal appearance.   HENT:      Head: Normocephalic.   Cardiovascular:      Rate and Rhythm: Normal rate and regular rhythm.   Pulmonary:      Effort: Pulmonary effort is normal.      Breath sounds: Normal breath sounds.   Abdominal:      General: Abdomen is flat. Bowel sounds are normal.      Palpations: Abdomen is soft.   Musculoskeletal:         General: Normal range of motion.      Cervical back: Neck supple.      Right lower leg: No edema.      Left lower leg: No edema.   Skin:     General: Skin is warm and dry.   Neurological:      Mental Status: She is alert and oriented to person, place, and time.      Gait: Gait is intact.   Psychiatric:         Attention and Perception: Attention normal.         Mood and " Affect: Mood normal.         Speech: Speech normal.      Result Review :    CMP          3/1/2023    08:46   CMP   Glucose 79    BUN 6    Creatinine 0.99    EGFR 61.1    Sodium 143    Potassium 4.1    Chloride 110    Calcium 9.2    BUN/Creatinine Ratio 6.1    Anion Gap 11.9                           Assessment and Plan   Diagnoses and all orders for this visit:    1. Hypokalemia (Primary)  -     Comprehensive Metabolic Panel    2. Primary insomnia  Assessment & Plan:  Increase Seroquel to 100 mg nightly  Continue Ambien 10 mg nightly    Orders:  -     QUEtiapine (SEROquel) 50 MG tablet; Take 2 tablets by mouth every night at bedtime for 90 days.  Dispense: 180 tablet; Refill: 1    3. Gastroesophageal reflux disease without esophagitis  Assessment & Plan:  Stable on Prilosec 40 mg twice daily      4. Preventative health care  Assessment & Plan:  Mammogram up-to-date  DEXA scan recommended, patient wants to check with insurance company prior to ordering  Colonoscopy due in 2028    Orders:  -     CBC (No Diff)  -     Comprehensive Metabolic Panel  -     Lipid Panel  -     TSH  -     Hemoglobin A1c    Other orders  -     montelukast (SINGULAIR) 10 MG tablet; Take 1 tablet by mouth Every Night.  Dispense: 90 tablet; Refill: 1  -     omeprazole (priLOSEC) 40 MG capsule; Take 1 capsule by mouth 2 (Two) Times a Day.  Dispense: 180 capsule; Refill: 1           I spent 25 minutes caring for Krystyna on this date of service. This time includes time spent by me in the following activities:preparing for the visit, reviewing tests, obtaining and/or reviewing a separately obtained history, performing a medically appropriate examination and/or evaluation , counseling and educating the patient/family/caregiver, ordering medications, tests, or procedures, documenting information in the medical record, and care coordination  Follow Up   Return in about 6 months (around 3/29/2024) for Insomnia.  Patient was given instructions and  counseling regarding her condition or for health maintenance advice. Please see specific information pulled into the AVS if appropriate.

## 2023-09-29 NOTE — ASSESSMENT & PLAN NOTE
Mammogram up-to-date  DEXA scan recommended, patient wants to check with insurance company prior to ordering  Colonoscopy due in 2028

## 2023-09-29 NOTE — PROGRESS NOTES
Venipuncture Blood Specimen Collection  Venipuncture performed in the left hand by Yadira Butler MA with good hemostasis. Patient tolerated the procedure well without complications.   09/29/23   Yadira Butler MA

## 2023-09-30 LAB
ALBUMIN SERPL-MCNC: 4.5 G/DL (ref 3.5–5.2)
ALBUMIN/GLOB SERPL: 1.6 G/DL
ALP SERPL-CCNC: 100 U/L (ref 39–117)
ALT SERPL W P-5'-P-CCNC: 16 U/L (ref 1–33)
ANION GAP SERPL CALCULATED.3IONS-SCNC: 10.1 MMOL/L (ref 5–15)
AST SERPL-CCNC: 23 U/L (ref 1–32)
BILIRUB SERPL-MCNC: 0.5 MG/DL (ref 0–1.2)
BUN SERPL-MCNC: 6 MG/DL (ref 8–23)
BUN/CREAT SERPL: 7 (ref 7–25)
CALCIUM SPEC-SCNC: 9.4 MG/DL (ref 8.6–10.5)
CHLORIDE SERPL-SCNC: 108 MMOL/L (ref 98–107)
CHOLEST SERPL-MCNC: 205 MG/DL (ref 0–200)
CO2 SERPL-SCNC: 23.9 MMOL/L (ref 22–29)
CREAT SERPL-MCNC: 0.86 MG/DL (ref 0.57–1)
EGFRCR SERPLBLD CKD-EPI 2021: 72.3 ML/MIN/1.73
GLOBULIN UR ELPH-MCNC: 2.8 GM/DL
GLUCOSE SERPL-MCNC: 80 MG/DL (ref 65–99)
HBA1C MFR BLD: 5.2 % (ref 4.8–5.6)
HDLC SERPL-MCNC: 69 MG/DL (ref 40–60)
LDLC SERPL CALC-MCNC: 128 MG/DL (ref 0–100)
LDLC/HDLC SERPL: 1.85 {RATIO}
POTASSIUM SERPL-SCNC: 3.8 MMOL/L (ref 3.5–5.2)
PROT SERPL-MCNC: 7.3 G/DL (ref 6–8.5)
SODIUM SERPL-SCNC: 142 MMOL/L (ref 136–145)
TRIGL SERPL-MCNC: 43 MG/DL (ref 0–150)
TSH SERPL DL<=0.05 MIU/L-ACNC: 1.45 UIU/ML (ref 0.27–4.2)
VLDLC SERPL-MCNC: 8 MG/DL (ref 5–40)

## 2023-10-03 DIAGNOSIS — F51.01 PRIMARY INSOMNIA: ICD-10-CM

## 2023-10-03 RX ORDER — ZOLPIDEM TARTRATE 10 MG/1
10 TABLET ORAL NIGHTLY PRN
Qty: 30 TABLET | Refills: 0 | Status: SHIPPED | OUTPATIENT
Start: 2023-10-03

## 2023-11-02 DIAGNOSIS — F51.01 PRIMARY INSOMNIA: ICD-10-CM

## 2023-11-02 RX ORDER — ZOLPIDEM TARTRATE 10 MG/1
10 TABLET ORAL NIGHTLY PRN
Qty: 30 TABLET | Refills: 0 | Status: SHIPPED | OUTPATIENT
Start: 2023-11-02

## 2023-11-20 ENCOUNTER — OFFICE VISIT (OUTPATIENT)
Dept: FAMILY MEDICINE CLINIC | Facility: CLINIC | Age: 73
End: 2023-11-20
Payer: COMMERCIAL

## 2023-11-20 VITALS
HEIGHT: 63 IN | BODY MASS INDEX: 29.59 KG/M2 | SYSTOLIC BLOOD PRESSURE: 138 MMHG | OXYGEN SATURATION: 100 % | DIASTOLIC BLOOD PRESSURE: 80 MMHG | WEIGHT: 167 LBS | HEART RATE: 72 BPM

## 2023-11-20 DIAGNOSIS — R03.0 ELEVATED BP WITHOUT DIAGNOSIS OF HYPERTENSION: ICD-10-CM

## 2023-11-20 DIAGNOSIS — J30.89 NON-SEASONAL ALLERGIC RHINITIS DUE TO OTHER ALLERGIC TRIGGER: ICD-10-CM

## 2023-11-20 DIAGNOSIS — F32.A ANXIETY AND DEPRESSION: Primary | ICD-10-CM

## 2023-11-20 DIAGNOSIS — F41.9 ANXIETY AND DEPRESSION: Primary | ICD-10-CM

## 2023-11-20 PROCEDURE — 99214 OFFICE O/P EST MOD 30 MIN: CPT | Performed by: NURSE PRACTITIONER

## 2023-11-20 RX ORDER — BUPROPION HYDROCHLORIDE 150 MG/1
150 TABLET ORAL DAILY
Qty: 90 TABLET | Refills: 0 | Status: SHIPPED | OUTPATIENT
Start: 2023-11-20

## 2023-11-20 RX ORDER — BUPROPION HYDROCHLORIDE 150 MG/1
150 TABLET ORAL DAILY
Qty: 30 TABLET | Refills: 1 | Status: SHIPPED | OUTPATIENT
Start: 2023-11-20 | End: 2023-11-20 | Stop reason: SDUPTHER

## 2023-11-20 NOTE — PROGRESS NOTES
"Chief Complaint  Anxiety (Increased anxiety for the last 4 months ), Anorexia (Having a loss of appetite. Not eating much, but not really losing weight at all. Concerned about thyroid/hormones ), and Allergies (Nasal and sinus drainage recently having issues with allergies acting up )    Subjective        Krystyna Fabian presents to [facility]  History of Present Illness    Patient presents with concerns of increased anxiety, worsening over the last few months.  She describes an appetite loss but no changes in her weight - forcing herself to eat, only eating once daily. Patient reports she has been worrying about growing older and being alone. She also reports increased stress at work - describes as being upset by the smallest things that would have never upset her previously. Patient is facing [facility] but does not feel financially prepared. She denies depression but admits doesn't have the desire to do things she once did. Patient previously on Wellbutrin. Blood pressure elevated without hx hypertension.     Patient also c/o nasal drainage and productive cough, hx allergies. She takes Singulair 10 mg nightly.       Objective   Vital Signs:  /80 (BP Location: Left arm, Patient Position: Sitting, Cuff Size: Adult)   Pulse 72   Ht 160 cm (63\")   Wt 75.8 kg (167 lb)   SpO2 100%   BMI 29.58 kg/m²   Estimated body mass index is 29.58 kg/m² as calculated from the following:    Height as of this encounter: 160 cm (63\").    Weight as of this encounter: 75.8 kg (167 lb).           Physical Exam  Constitutional:       Appearance: Normal appearance.   HENT:      Head: Normocephalic.   Cardiovascular:      Rate and Rhythm: Normal rate and regular rhythm.   Pulmonary:      Effort: Pulmonary effort is normal.      Breath sounds: Normal breath sounds.   Abdominal:      General: Abdomen is flat. Bowel sounds are normal.      Palpations: Abdomen is soft.   Musculoskeletal:         " General: Normal range of motion.      Cervical back: Neck supple.      Right lower leg: No edema.      Left lower leg: No edema.   Skin:     General: Skin is warm and dry.   Neurological:      Mental Status: She is alert and oriented to person, place, and time.      Gait: Gait is intact.   Psychiatric:         Attention and Perception: Attention normal.         Mood and Affect: Mood normal.         Speech: Speech normal.        Result Review :    CMP          3/1/2023    08:46 9/29/2023    12:00   CMP   Glucose 79  80    BUN 6  6    Creatinine 0.99  0.86    EGFR 61.1  72.3    Sodium 143  142    Potassium 4.1  3.8    Chloride 110  108    Calcium 9.2  9.4    Total Protein  7.3    Albumin  4.5    Globulin  2.8    Total Bilirubin  0.5    Alkaline Phosphatase  100    AST (SGOT)  23    ALT (SGPT)  16    Albumin/Globulin Ratio  1.6    BUN/Creatinine Ratio 6.1  7.0    Anion Gap 11.9  10.1      CBC          9/29/2023    12:00   CBC   WBC 8.41    RBC 4.69    Hemoglobin 13.9    Hematocrit 41.9    MCV 89.3    MCH 29.6    MCHC 33.2    RDW 13.5    Platelets 283      Lipid Panel          9/29/2023    12:00   Lipid Panel   Total Cholesterol 205    Triglycerides 43    HDL Cholesterol 69    VLDL Cholesterol 8    LDL Cholesterol  128    LDL/HDL Ratio 1.85      TSH          9/29/2023    12:00   TSH   TSH 1.450      Most Recent A1C          9/29/2023    12:00   HGBA1C Most Recent   Hemoglobin A1C 5.20                   Assessment and Plan   Diagnoses and all orders for this visit:    1. Anxiety and depression (Primary)  Assessment & Plan:  Start Wellbutrin 150 mg daily  Follow up in 6 weeks - call sooner if needed      2. Elevated BP without diagnosis of hypertension  Assessment & Plan:  Repeat blood pressure improved  Likely d/t anxiety      3. Non-seasonal allergic rhinitis due to other allergic trigger  Assessment & Plan:  Continue Singulair  Recommend adding Flonase/Nasacort       Other orders  -     buPROPion XL (Wellbutrin XL) 150  MG 24 hr tablet; Take 1 tablet by mouth Daily.  Dispense: 30 tablet; Refill: 1           I spent 30 minutes caring for Krystyna on this date of service. This time includes time spent by me in the following activities:preparing for the visit, reviewing tests, obtaining and/or reviewing a separately obtained history, performing a medically appropriate examination and/or evaluation , counseling and educating the patient/family/caregiver, ordering medications, tests, or procedures, documenting information in the medical record, and care coordination  Follow Up   Return in about 6 weeks (around 1/1/2024) for Depression/Anxiety.  Patient was given instructions and counseling regarding her condition or for health maintenance advice. Please see specific information pulled into the AVS if appropriate.

## 2023-11-22 ENCOUNTER — TELEPHONE (OUTPATIENT)
Dept: FAMILY MEDICINE CLINIC | Facility: CLINIC | Age: 73
End: 2023-11-22
Payer: COMMERCIAL

## 2023-11-22 RX ORDER — AMOXICILLIN 500 MG/1
500 CAPSULE ORAL 2 TIMES DAILY
Qty: 20 CAPSULE | Refills: 0 | Status: SHIPPED | OUTPATIENT
Start: 2023-11-22 | End: 2023-12-02

## 2023-11-22 NOTE — TELEPHONE ENCOUNTER
Pt called in with concern of feeling worse after recent appt. Pt states she is coughing up big dark green clumps of mucus. Pt was wanting same day simona, informed schedule is full. Please advise

## 2023-11-22 NOTE — TELEPHONE ENCOUNTER
Schedule is full, unfortunately.  I am going to send in amoxicillin for her to start taking.  Finish all of medication even if feeling better.  Irrigate both nasal passages with normal saline daily.  Mucinex D would also be recommended to help with cough and congestion

## 2023-11-22 NOTE — TELEPHONE ENCOUNTER
Caller: Fabian Krystyna L    Relationship: Self    Best call back number: [phone] (Mobile)     What medication are you requesting: YEAST INFECTION FROM ANTIBIOTIC    What are your current symptoms: YEAST INFECTION    How long have you been experiencing symptoms:      Have you had these symptoms before:    [] Yes  [] No    Have you been treated for these symptoms before:   [] Yes  [] No    If a prescription is needed, what is your preferred pharmacy and phone number:  [id number] [address] AT [facility] [phone] [facility] [phone]      Additional notes:PLEASE CONTACT PATIENT TO ADVISE.          THANKS

## 2023-11-26 RX ORDER — FLUCONAZOLE 150 MG/1
150 TABLET ORAL ONCE
Qty: 1 TABLET | Refills: 0 | Status: SHIPPED | OUTPATIENT
Start: 2023-11-26 | End: 2023-11-26

## 2023-11-29 DIAGNOSIS — F51.01 PRIMARY INSOMNIA: ICD-10-CM

## 2023-11-29 RX ORDER — ZOLPIDEM TARTRATE 10 MG/1
10 TABLET ORAL NIGHTLY PRN
Qty: 30 TABLET | Refills: 1 | Status: SHIPPED | OUTPATIENT
Start: 2023-11-29

## 2023-11-29 NOTE — TELEPHONE ENCOUNTER
Rx request    Chief Complaint:  Sub Arachnoid Hemorrhage.    Acute Problems:  -Subarachnoid Hemorrhage  -Blood pressure management.  -Headache management.  -Fever management.     History:  1 week post partum.    Yesterday's Plan:  -Start PO antihypertensives.  -OOB to chair.  -Tylenol RTC for fever and headache management.    Last 24 hour updates:  -Improved headache.  -Less requirement for Cardene with introduction of PO antihypertensives.      Ancillary Management:  Chest PT[]  Head of bed >35 [x]  Out of bed to chair [x]  PT/OT/ST []  Spirometry[]  DVT prophalaxis[x]    Medications:  acetaminophen   Tablet .. 650 milliGRAM(s) Oral every 4 hours  cefTRIAXone   IVPB      chlorhexidine 4% Liquid 1 Application(s) Topical <User Schedule>  docusate sodium 100 milliGRAM(s) Oral three times a day  labetalol 150 milliGRAM(s) Oral every 6 hours  levETIRAcetam  IVPB 750 milliGRAM(s) IV Intermittent every 12 hours  magnesium oxide 400 milliGRAM(s) Oral three times a day with meals  NIFEdipine IR 10 milliGRAM(s) Oral every 8 hours  niMODipine 30 milliGRAM(s) Oral every 3 hours  pantoprazole    Tablet 40 milliGRAM(s) Oral before breakfast  senna 2 Tablet(s) Oral at bedtime  sodium chloride 0.9%. 1000 milliLiter(s) IV Continuous <Continuous>    Vitals:  T(F): 99.9 (19 @ 12:00), Max: 99.9 (19 @ 08:00)  HR: 80 (19 @ 13:30) (74 - 102)  BP: 119/61 (19 @ 13:30) (92/44 - 145/69)  RR: 24 (19 @ 13:30) (15 - 33)  SpO2: 98% (19 @ 13:30) (93% - 99%)            Neuro Exam:  Awake []no[x]spontaneously[]occasionally[]to stimuli  AI [x]y[]n   Following commands:[x]y[]n  Oriented:[]0[]1[]2[x]3    Tracking:[x]y[]n  Language:intact  Time off sedation for exam:  Pupils:  Right  3>2       Left    3>2              Corneal:+       Gag reflex:+       EOMI:intact        EVD ICP:             Level(cm):          24hr(ml):                             CSF:   W:      R:     C:    P:     G:     LA:    LOC:   0   Questions: 0      Commands: 0   VF:    0   Gaze:  0   Facial:      0  RUE:  0 RLE:0    LUE:   0  LLE:0  Ataxia:            0  Sensory:0  Language:     0     Dysarthria:0  Extinction:0    NIHSS:0    GCS: 15      Hunt&Balbuena:2   m-Al: 2     Last CTH:  < from: CT Head No Cont (18 @ 22:56) >  IMPRESSION:    1.  Diffuse subarachnoid hemorrhage, with a more focal round hematoma   within the left frontotemporal region.    2.  Mild hydrocephalus.    Last CTA/MRA:  < from: CT Angio Head w/ IV Cont (18 @ 23:18) >  IMPRESSION:     No large vessel occlusion or significant stenosis of the vessels of the   head and neck.    No definite vascular malformation is identified.    Last CTP:    Last MRI:  < from: MR Head w/ IV Cont (19 @ 21:24) >  1.  1 cm left temporal superficial cavernoma. Consider the possibility   that the cavernoma bled into the adjacent sulcus, causing SAH    2.  Possible adjacent small developmental venous anomaly (correlation   with conventional angiogram )    3.  No evidence for cavernous sinus thrombosis    4.  Thinning of the right lateral transverse sinus. This is in an area   where there is an arachnoid granulation. (Correlation with CT scan).   Consider altered flow dynamics or stenosis, less likely intraluminal   thrombus.    Last TCD:    Last EEG:    CVS:  ICU Vital Signs Last 24 Hrs  HR: 84 (2019 13:45) (74 - 102)  BP: 113/66 (2019 13:45) (92/44 - 145/69)  BP(mean): 79 (2019 13:45) (57 - 102)    < from: Transthoracic Echocardiogram (18 @ 07:46) >    Summary:   1. Left ventricular ejection fraction, by visual estimation, is 65 to   70%.   2. Normal global left ventricular systolic function.   3. Mild mitral regurgitation.   4. Mild tricuspid regurgitation.      < from: 12 Lead ECG (18 @ 06:13) >    Diagnosis Line Normal sinus rhythm  Cannot rule out Inferior infarct , age undetermined  Abnormal ECG    Respitory:    ABG: NA    < from: Xray Chest 1 View-PORTABLE IMMEDIATE (19 @ 11:25) >    IMPRESSION:     Decreased pulmonary vascular congestion.    Vent setting:NA      GI:  Prophalaxis: Protonix    LIVER FUNCTIONS - ( 2019 04:45 )  Alb: 3.3 g/dL / Pro: 5.5 g/dL / ALK PHOS: 76 U/L / ALT: 11 U/L / AST: 13 U/L / GGT: x           Renal:  Urinalysis Basic - ( 2019 21:45 )    Color: Yellow / Appearance: Cloudy / S.020 / pH: x  Gluc: x / Ketone: Negative  / Bili: Negative / Urobili: 0.2 mg/dL   Blood: x / Protein: 30 mg/dL / Nitrite: Negative   Leuk Esterase: Small / RBC: >50 /HPF / WBC 10-25 /HPF   Sq Epi: x / Non Sq Epi: Few /HPF / Bacteria: Moderate /HPF    Potassium, Random Urine: 23  Sodium, Random Urine: 66  Creatinine, Random Urine: 76  Urine Microscopic-Add On (NC) (19 @ 21:45)    Bacteria: Moderate /HPF    Epithelial Cells: Few /HPF    White Blood Cell - Urine: 10-25 /HPF    Red Blood Cell - Urine: >50 /HPF        138  |  102  |  18  ----------------------------<  86  3.9   |  20  |  0.6<L>    Ca    7.6<L>      2019 04:45  Phos  4.0       Mg     2.3         TPro  5.5<L>  /  Alb  3.3<L>  /  TBili  0.3  /  DBili  <0.2  /  AST  13  /  ALT  11  /  AlkPhos  76        I&O's Detail    2019 07:01  -  2019 07:00  --------------------------------------------------------  IN:    IV PiggyBack: 100 mL    magnesium sulfate  Infusion: 75 mL    magnesium sulfate  Infusion: 50 mL    magnesium sulfate  Infusion: 175 mL    niCARdipine Infusion: 725 mL    Oral Fluid: 240 mL    sodium chloride 0.9%: 200 mL    sodium chloride 0.9%.: 950 mL  Total IN: 2515 mL    OUT:    Voided: 2600 mL  Total OUT: 2600 mL    Total NET: -85 mL      2019 07:  -  2019 13:42  --------------------------------------------------------  IN:    magnesium sulfate  Infusion: 75 mL    niCARdipine Infusion: 40 mL    sodium chloride 0.9%.: 300 mL  Total IN: 415 mL    OUT:    Voided: 500 mL  Total OUT: 500 mL    Total NET: -85 mL    ID    T(C): 37.7 (19 @ 12:00), Max: 37.7 (19 @ 16:00)  T(F): 99.9 (19 @ 12:00), Max: 99.9 (19 @ 08:00)  RR: 21 (19 @ 13:45) (15 - 33)  SpO2: 98% (19 @ 13:45) (93% - 99%)    Lactate, Blood: 1.4 mmol/L (19 @ 00:50)    Hematology:    INR: 0.93                        10.5   7.69  )-----------( 202      ( 2019 04:45 )             31.8 Chief Complaint:  Sub Arachnoid Hemorrhage.    Acute Problems:  -Subarachnoid Hemorrhage  -HTN  -Headache   -Fever with low WBC and negative left shift.    History:  1 week post partum.    Yesterday's Plan:  -Start PO antihypertensives.  -OOB to chair.  -Tylenol RTC for fever and headache management.    Last 24 hour updates:  -Improved headache.  -Less requirement for Cardene with introduction of PO antihypertensives.      Ancillary Management:  Chest PT[]  Head of bed >35 [x]  Out of bed to chair [x]  PT/OT/ST []  Spirometry[]  DVT prophalaxis[x]    Medications:  acetaminophen   Tablet .. 650 milliGRAM(s) Oral every 4 hours  cefTRIAXone   IVPB      chlorhexidine 4% Liquid 1 Application(s) Topical <User Schedule>  docusate sodium 100 milliGRAM(s) Oral three times a day  labetalol 150 milliGRAM(s) Oral every 6 hours  levETIRAcetam  IVPB 750 milliGRAM(s) IV Intermittent every 12 hours  magnesium oxide 400 milliGRAM(s) Oral three times a day with meals  NIFEdipine IR 10 milliGRAM(s) Oral every 8 hours  niMODipine 30 milliGRAM(s) Oral every 3 hours  pantoprazole    Tablet 40 milliGRAM(s) Oral before breakfast  senna 2 Tablet(s) Oral at bedtime  sodium chloride 0.9%. 1000 milliLiter(s) IV Continuous <Continuous>    Vitals:  T(F): 99.9 (19 @ 12:00), Max: 99.9 (19 @ 08:00)  HR: 80 (19 @ 13:30) (74 - 102)  BP: 119/61 (19 @ 13:30) (92/44 - 145/69)  RR: 24 (19 @ 13:30) (15 - 33)  SpO2: 98% (19 @ 13:30) (93% - 99%)            Neuro Exam:  Awake []no[x]spontaneously[]occasionally[]to stimuli  AI [x]y[]n   Following commands:[x]y[]n  Oriented:[]0[]1[]2[x]3    Tracking:[x]y[]n  Language:intact  Time off sedation for exam:  Pupils:  Right  3>2       Left    3>2              Corneal:+       Gag reflex:+       EOMI:intact        EVD ICP:             Level(cm):          24hr(ml):                             CSF:   W:      R:     C:    P:     G:     LA:    LOC:   0   Questions: 0      Commands: 0   VF:    0   Gaze:  0   Facial:      0  RUE:  0 RLE:0    LUE:   0  LLE:0  Ataxia:            0  Sensory:0  Language:     0     Dysarthria:0  Extinction:0    NIHSS:0    GCS: 15      Hunt&Balbuena:2   m-Al: 2     Last CTH:  < from: CT Head No Cont (18 @ 22:56) >  IMPRESSION:    1.  Diffuse subarachnoid hemorrhage, with a more focal round hematoma   within the left frontotemporal region.    2.  Mild hydrocephalus.    Last CTA/MRA:  < from: CT Angio Head w/ IV Cont (18 @ 23:18) >  IMPRESSION:     No large vessel occlusion or significant stenosis of the vessels of the   head and neck.    No definite vascular malformation is identified.    Last CTP:    Last MRI:  < from: MR Head w/ IV Cont (19 @ 21:24) >  1.  1 cm left temporal superficial cavernoma. Consider the possibility   that the cavernoma bled into the adjacent sulcus, causing SAH    2.  Possible adjacent small developmental venous anomaly (correlation   with conventional angiogram )    3.  No evidence for cavernous sinus thrombosis    4.  Thinning of the right lateral transverse sinus. This is in an area   where there is an arachnoid granulation. (Correlation with CT scan).   Consider altered flow dynamics or stenosis, less likely intraluminal   thrombus.    Last TCD:    Last EEG:    CVS:  ICU Vital Signs Last 24 Hrs  HR: 84 (2019 13:45) (74 - 102)  BP: 113/66 (2019 13:45) (92/44 - 145/69)  BP(mean): 79 (2019 13:45) (57 - 102)    < from: Transthoracic Echocardiogram (18 @ 07:46) >    Summary:   1. Left ventricular ejection fraction, by visual estimation, is 65 to   70%.   2. Normal global left ventricular systolic function.   3. Mild mitral regurgitation.   4. Mild tricuspid regurgitation.      < from: 12 Lead ECG (18 @ 06:13) >    Diagnosis Line Normal sinus rhythm  Cannot rule out Inferior infarct , age undetermined  Abnormal ECG    Respitory:    ABG: NA    < from: Xray Chest 1 View-PORTABLE IMMEDIATE (19 @ 11:25) >    IMPRESSION:     Decreased pulmonary vascular congestion.    Vent setting:NA      GI:  Prophalaxis: Protonix    LIVER FUNCTIONS - ( 2019 04:45 )  Alb: 3.3 g/dL / Pro: 5.5 g/dL / ALK PHOS: 76 U/L / ALT: 11 U/L / AST: 13 U/L / GGT: x           Renal:  Urinalysis Basic - ( 2019 21:45 )    Color: Yellow / Appearance: Cloudy / S.020 / pH: x  Gluc: x / Ketone: Negative  / Bili: Negative / Urobili: 0.2 mg/dL   Blood: x / Protein: 30 mg/dL / Nitrite: Negative   Leuk Esterase: Small / RBC: >50 /HPF / WBC 10-25 /HPF   Sq Epi: x / Non Sq Epi: Few /HPF / Bacteria: Moderate /HPF    Potassium, Random Urine: 23  Sodium, Random Urine: 66  Creatinine, Random Urine: 76  Urine Microscopic-Add On (NC) (19 @ 21:45)    Bacteria: Moderate /HPF    Epithelial Cells: Few /HPF    White Blood Cell - Urine: 10-25 /HPF    Red Blood Cell - Urine: >50 /HPF        138  |  102  |  18  ----------------------------<  86  3.9   |  20  |  0.6<L>    Ca    7.6<L>      2019 04:45  Phos  4.0       Mg     2.3         TPro  5.5<L>  /  Alb  3.3<L>  /  TBili  0.3  /  DBili  <0.2  /  AST  13  /  ALT  11  /  AlkPhos  76        I&O's Detail    2019 07:01  -  2019 07:00  --------------------------------------------------------  IN:    IV PiggyBack: 100 mL    magnesium sulfate  Infusion: 75 mL    magnesium sulfate  Infusion: 50 mL    magnesium sulfate  Infusion: 175 mL    niCARdipine Infusion: 725 mL    Oral Fluid: 240 mL    sodium chloride 0.9%: 200 mL    sodium chloride 0.9%.: 950 mL  Total IN: 2515 mL    OUT:    Voided: 2600 mL  Total OUT: 2600 mL    Total NET: -85 mL      2019 07:  -  2019 13:42  --------------------------------------------------------  IN:    magnesium sulfate  Infusion: 75 mL    niCARdipine Infusion: 40 mL    sodium chloride 0.9%.: 300 mL  Total IN: 415 mL    OUT:    Voided: 500 mL  Total OUT: 500 mL    Total NET: -85 mL    ID    T(C): 37.7 (19 @ 12:00), Max: 37.7 (19 @ 16:00)  T(F): 99.9 (19 @ 12:00), Max: 99.9 (19 @ 08:00)  RR: 21 (19 @ 13:45) (15 - 33)  SpO2: 98% (19 @ 13:45) (93% - 99%)    Lactate, Blood: 1.4 mmol/L (19 @ 00:50)    Hematology:    INR: 0.93                        10.5   7.69  )-----------( 202      ( 2019 04:45 )             31.8

## 2024-01-03 ENCOUNTER — OFFICE VISIT (OUTPATIENT)
Dept: FAMILY MEDICINE CLINIC | Facility: CLINIC | Age: 74
End: 2024-01-03
Payer: COMMERCIAL

## 2024-01-03 VITALS
WEIGHT: 163 LBS | DIASTOLIC BLOOD PRESSURE: 68 MMHG | HEART RATE: 68 BPM | HEIGHT: 63 IN | BODY MASS INDEX: 28.88 KG/M2 | OXYGEN SATURATION: 98 % | SYSTOLIC BLOOD PRESSURE: 118 MMHG

## 2024-01-03 DIAGNOSIS — F32.A ANXIETY AND DEPRESSION: Primary | ICD-10-CM

## 2024-01-03 DIAGNOSIS — F41.9 ANXIETY AND DEPRESSION: Primary | ICD-10-CM

## 2024-01-03 DIAGNOSIS — F51.01 PRIMARY INSOMNIA: ICD-10-CM

## 2024-01-03 PROCEDURE — 99213 OFFICE O/P EST LOW 20 MIN: CPT | Performed by: NURSE PRACTITIONER

## 2024-01-03 RX ORDER — POTASSIUM CHLORIDE 1500 MG/1
20 TABLET, EXTENDED RELEASE ORAL 2 TIMES DAILY
COMMUNITY
Start: 2023-09-29 | End: 2024-01-03 | Stop reason: SDUPTHER

## 2024-01-03 RX ORDER — BACLOFEN 10 MG/1
10 TABLET ORAL NIGHTLY
Qty: 90 TABLET | Refills: 2 | Status: SHIPPED | OUTPATIENT
Start: 2024-01-03

## 2024-01-03 RX ORDER — POTASSIUM CHLORIDE 1500 MG/1
20 TABLET, EXTENDED RELEASE ORAL 2 TIMES DAILY
Qty: 180 TABLET | Refills: 0 | Status: SHIPPED | OUTPATIENT
Start: 2024-01-03 | End: 2024-04-02

## 2024-01-03 NOTE — PROGRESS NOTES
"Chief Complaint  Follow-up (6 week follow up anxiety/depression)    Subjective        Krystyna Fabian presents to [facility]  History of Present Illness    Patient presents for f/u visit regarding depression and anxiety. She was seen on 11/20 with concerns of increasing anxiety and worry. Patient started on Wellbutrin 150 mg daily and she is taking as prescribed. She reports some improvement in anxiety. Patient quit one of her jobs which helped with some of her mental stress but adds financial concerns. Patient is taking Seroquel 50 mg nightly along with Ambien 10 mg PRN for insomnia. She reports sleeping relatively well.     Objective   Vital Signs:  /68 (BP Location: Left arm, Patient Position: Sitting, Cuff Size: Adult)   Pulse 68   Ht 160 cm (63\")   Wt 73.9 kg (163 lb)   SpO2 98%   BMI 28.87 kg/m²   Estimated body mass index is 28.87 kg/m² as calculated from the following:    Height as of this encounter: 160 cm (63\").    Weight as of this encounter: 73.9 kg (163 lb).           Physical Exam  Constitutional:       Appearance: Normal appearance.   HENT:      Head: Normocephalic.   Cardiovascular:      Rate and Rhythm: Normal rate and regular rhythm.   Pulmonary:      Effort: Pulmonary effort is normal.      Breath sounds: Normal breath sounds.   Abdominal:      General: Abdomen is flat. Bowel sounds are normal.      Palpations: Abdomen is soft.   Musculoskeletal:         General: Normal range of motion.      Cervical back: Neck supple.      Right lower leg: No edema.      Left lower leg: No edema.   Skin:     General: Skin is warm and dry.   Neurological:      Mental Status: She is alert and oriented to person, place, and time.      Gait: Gait is intact.   Psychiatric:         Attention and Perception: Attention normal.         Mood and Affect: Mood normal.         Speech: Speech normal.        Result Review :    CMP          3/1/2023    08:46 9/29/2023    12:00   [facility] "   Glucose 79  80    BUN 6  6    Creatinine 0.99  0.86    EGFR 61.1  72.3    Sodium 143  142    Potassium 4.1  3.8    Chloride 110  108    Calcium 9.2  9.4    Total Protein  7.3    Albumin  4.5    Globulin  2.8    Total Bilirubin  0.5    Alkaline Phosphatase  100    AST (SGOT)  23    ALT (SGPT)  16    Albumin/Globulin Ratio  1.6    BUN/Creatinine Ratio 6.1  7.0    Anion Gap 11.9  10.1      CBC          9/29/2023    12:00   CBC   WBC 8.41    RBC 4.69    Hemoglobin 13.9    Hematocrit 41.9    MCV 89.3    MCH 29.6    MCHC 33.2    RDW 13.5    Platelets 283      Lipid Panel          9/29/2023    12:00   Lipid Panel   Total Cholesterol 205    Triglycerides 43    HDL Cholesterol 69    VLDL Cholesterol 8    LDL Cholesterol  128    LDL/HDL Ratio 1.85      TSH          9/29/2023    12:00   TSH   TSH 1.450      Most Recent A1C          9/29/2023    12:00   HGBA1C Most Recent   Hemoglobin A1C 5.20                   Assessment and Plan   Diagnoses and all orders for this visit:    1. Anxiety and depression (Primary)  Assessment & Plan:  Improving on Wellbutrin  mg daily  Follow up in March as scheduled      2. Primary insomnia  Assessment & Plan:  Stable on Ambien and Seroquel      Other orders  -     baclofen (LIORESAL) 10 MG tablet; Take 1 tablet by mouth Every Night.  Dispense: 90 tablet; Refill: 2  -     potassium chloride ER (K-TAB) 20 MEQ tablet controlled-release ER tablet; Take 1 tablet by mouth 2 (Two) Times a Day for 90 days.  Dispense: 180 tablet; Refill: 0           I spent 20 minutes caring for Krystyna on this date of service. This time includes time spent by me in the following activities:preparing for the visit, reviewing tests, obtaining and/or reviewing a separately obtained history, performing a medically appropriate examination and/or evaluation , counseling and educating the patient/family/caregiver, ordering medications, tests, or procedures, documenting information in the medical record, and care  coordination  Follow Up   Return for Next scheduled follow up.  Patient was given instructions and counseling regarding her condition or for health maintenance advice. Please see specific information pulled into the AVS if appropriate.

## 2024-01-24 DIAGNOSIS — F51.01 PRIMARY INSOMNIA: ICD-10-CM

## 2024-01-24 RX ORDER — ZOLPIDEM TARTRATE 10 MG/1
10 TABLET ORAL NIGHTLY PRN
Qty: 30 TABLET | Refills: 0 | Status: SHIPPED | OUTPATIENT
Start: 2024-01-24

## 2024-02-19 RX ORDER — BUPROPION HYDROCHLORIDE 150 MG/1
150 TABLET ORAL DAILY
Qty: 90 TABLET | Refills: 3 | Status: SHIPPED | OUTPATIENT
Start: 2024-02-19

## 2024-02-21 DIAGNOSIS — F51.01 PRIMARY INSOMNIA: ICD-10-CM

## 2024-02-21 RX ORDER — ZOLPIDEM TARTRATE 10 MG/1
10 TABLET ORAL NIGHTLY PRN
Qty: 30 TABLET | Refills: 1 | Status: SHIPPED | OUTPATIENT
Start: 2024-02-21

## 2024-03-18 RX ORDER — POTASSIUM CHLORIDE 1500 MG/1
20 TABLET, EXTENDED RELEASE ORAL 2 TIMES DAILY
Qty: 180 TABLET | Refills: 3 | Status: SHIPPED | OUTPATIENT
Start: 2024-03-18

## 2024-03-29 ENCOUNTER — TELEPHONE (OUTPATIENT)
Dept: FAMILY MEDICINE CLINIC | Facility: CLINIC | Age: 74
End: 2024-03-29

## 2024-03-29 ENCOUNTER — OFFICE VISIT (OUTPATIENT)
Dept: FAMILY MEDICINE CLINIC | Facility: CLINIC | Age: 74
End: 2024-03-29
Payer: COMMERCIAL

## 2024-03-29 VITALS
HEIGHT: 63 IN | BODY MASS INDEX: 31.71 KG/M2 | OXYGEN SATURATION: 97 % | SYSTOLIC BLOOD PRESSURE: 128 MMHG | HEART RATE: 79 BPM | DIASTOLIC BLOOD PRESSURE: 70 MMHG | WEIGHT: 179 LBS

## 2024-03-29 DIAGNOSIS — J40 BRONCHITIS: ICD-10-CM

## 2024-03-29 DIAGNOSIS — Z12.31 SCREENING MAMMOGRAM FOR BREAST CANCER: Primary | ICD-10-CM

## 2024-03-29 DIAGNOSIS — K21.9 GASTROESOPHAGEAL REFLUX DISEASE WITHOUT ESOPHAGITIS: ICD-10-CM

## 2024-03-29 DIAGNOSIS — F51.01 PRIMARY INSOMNIA: ICD-10-CM

## 2024-03-29 DIAGNOSIS — E87.6 HYPOKALEMIA: ICD-10-CM

## 2024-03-29 DIAGNOSIS — F41.9 ANXIETY: ICD-10-CM

## 2024-03-29 RX ORDER — POTASSIUM CHLORIDE 1500 MG/1
20 TABLET, EXTENDED RELEASE ORAL 2 TIMES DAILY
Qty: 180 TABLET | Refills: 3 | Status: SHIPPED | OUTPATIENT
Start: 2024-03-29

## 2024-03-29 RX ORDER — ALBUTEROL SULFATE 90 UG/1
2 AEROSOL, METERED RESPIRATORY (INHALATION) EVERY 4 HOURS PRN
Qty: 8 G | Refills: 1 | Status: SHIPPED | OUTPATIENT
Start: 2024-03-29

## 2024-03-29 RX ORDER — AMOXICILLIN 500 MG/1
500 CAPSULE ORAL 2 TIMES DAILY
Qty: 20 CAPSULE | Refills: 0 | Status: SHIPPED | OUTPATIENT
Start: 2024-03-29 | End: 2024-04-08

## 2024-03-29 RX ORDER — CODEINE PHOSPHATE/GUAIFENESIN 10-100MG/5
5 LIQUID (ML) ORAL 3 TIMES DAILY PRN
Qty: 150 ML | Refills: 0 | Status: SHIPPED | OUTPATIENT
Start: 2024-03-29

## 2024-03-29 RX ORDER — BACLOFEN 10 MG/1
10 TABLET ORAL NIGHTLY
Qty: 90 TABLET | Refills: 2 | Status: SHIPPED | OUTPATIENT
Start: 2024-03-29 | End: 2024-03-29 | Stop reason: SDUPTHER

## 2024-03-29 RX ORDER — METHYLPREDNISOLONE 4 MG/1
TABLET ORAL
Qty: 21 TABLET | Refills: 0 | Status: SHIPPED | OUTPATIENT
Start: 2024-03-29

## 2024-03-29 RX ORDER — BACLOFEN 10 MG/1
10 TABLET ORAL NIGHTLY
Qty: 90 TABLET | Refills: 2 | Status: SHIPPED | OUTPATIENT
Start: 2024-03-29

## 2024-03-29 RX ORDER — POTASSIUM CHLORIDE 1500 MG/1
20 TABLET, EXTENDED RELEASE ORAL 2 TIMES DAILY
Qty: 180 TABLET | Refills: 3 | Status: SHIPPED | OUTPATIENT
Start: 2024-03-29 | End: 2024-03-29 | Stop reason: SDUPTHER

## 2024-03-29 NOTE — ASSESSMENT & PLAN NOTE
Amoxicillin BID x 10 days  Medrol dose pack  Albuterol PRN dyspnea/wheezing  Guaifenesin AC PRN cough  Call if sx persist or worsen

## 2024-03-29 NOTE — PROGRESS NOTES
"Chief Complaint  Follow-up (6 month follow up insomnia ) and Cough (Cough for the last 6 weeks with green and yellow production )    Subjective        Krystyna Fabian presents to [facility]  History of Present Illness    Patient presents for follow-up visit.     Patient has insomnia, taking Seroquel 50 mg nightly and Ambien 10 mg nightly for insomnia.  She is taking Wellbutrin  mg daily for anxiety. Patient reports sx stable, has no acute complaints.     Patient is taking Prilosec 40 mg BID for GERD. She reports symptoms are stable, has no acute complaints.     Patient is c/o productive cough, chest tightness, dyspnea and wheezing - sx ongoing x 6 weeks. She denies fever or body aches.     Advance Care Planning   ACP discussion was held with the patient during this visit. Patient does not have an advance directive, information provided.        Objective   Vital Signs:  /70 (BP Location: Left arm, Patient Position: Sitting, Cuff Size: Adult)   Pulse 79   Ht 160 cm (63\")   Wt 81.2 kg (179 lb)   SpO2 97%   BMI 31.71 kg/m²   Estimated body mass index is 31.71 kg/m² as calculated from the following:    Height as of this encounter: 160 cm (63\").    Weight as of this encounter: 81.2 kg (179 lb).       BMI is >= 30 and <35. (Class 1 Obesity). The following options were offered after discussion;: exercise counseling/recommendations and nutrition counseling/recommendations      Physical Exam  Constitutional:       Appearance: Normal appearance.   HENT:      Head: Normocephalic.   Cardiovascular:      Rate and Rhythm: Normal rate and regular rhythm.   Pulmonary:      Effort: Pulmonary effort is normal.      Breath sounds: Normal breath sounds. Examination of the right-lower field reveals wheezing. Examination of the left-lower field reveals rhonchi.   Abdominal:      General: Abdomen is flat. Bowel sounds are normal.      Palpations: Abdomen is soft.   Musculoskeletal:       "   General: Normal range of motion.      Cervical back: Neck supple.      Right lower leg: No edema.      Left lower leg: No edema.   Skin:     General: Skin is warm and dry.   Neurological:      Mental Status: She is alert and oriented to person, place, and time.      Gait: Gait is intact.   Psychiatric:         Attention and Perception: Attention normal.         Mood and Affect: Mood normal.         Speech: Speech normal.        Result Review :      CMP          9/29/2023    12:00   CMP   Glucose 80    BUN 6    Creatinine 0.86    EGFR 72.3    Sodium 142    Potassium 3.8    Chloride 108    Calcium 9.4    Total Protein 7.3    Albumin 4.5    Globulin 2.8    Total Bilirubin 0.5    Alkaline Phosphatase 100    AST (SGOT) 23    ALT (SGPT) 16    Albumin/Globulin Ratio 1.6    BUN/Creatinine Ratio 7.0    Anion Gap 10.1      CBC          9/29/2023    12:00   CBC   WBC 8.41    RBC 4.69    Hemoglobin 13.9    Hematocrit 41.9    MCV 89.3    MCH 29.6    MCHC 33.2    RDW 13.5    Platelets 283      Lipid Panel          9/29/2023    12:00   Lipid Panel   Total Cholesterol 205    Triglycerides 43    HDL Cholesterol 69    VLDL Cholesterol 8    LDL Cholesterol  128    LDL/HDL Ratio 1.85      TSH          9/29/2023    12:00   TSH   TSH 1.450      Most Recent A1C          9/29/2023    12:00   HGBA1C Most Recent   Hemoglobin A1C 5.20                   Assessment and Plan     Diagnoses and all orders for this visit:    1. Screening mammogram for breast cancer (Primary)  -     Mammo Screening Digital Tomosynthesis Bilateral With CAD; Future    2. Bronchitis  Assessment & Plan:  Amoxicillin BID x 10 days  Medrol dose pack  Albuterol PRN dyspnea/wheezing  Guaifenesin AC PRN cough  Call if sx persist or worsen    Orders:  -     methylPREDNISolone (MEDROL) 4 MG dose pack; Take as directed on package instructions.  Dispense: 21 tablet; Refill: 0  -     amoxicillin (AMOXIL) 500 MG capsule; Take 1 capsule by mouth 2 (Two) Times a Day for 10 days.   Dispense: 20 capsule; Refill: 0  -     guaiFENesin-codeine (GUAIFENESIN AC) 100-10 MG/5ML liquid; Take 5 mL by mouth 3 (Three) Times a Day As Needed for Cough or Congestion.  Dispense: 150 mL; Refill: 0  -     albuterol sulfate  (90 Base) MCG/ACT inhaler; Inhale 2 puffs Every 4 (Four) Hours As Needed for Wheezing.  Dispense: 8 g; Refill: 1    3. Primary insomnia  Assessment & Plan:  Stable on Seroquel and Ambien      4. Anxiety  Assessment & Plan:  Stable on Wellbutrin 150 mg daily      5. Gastroesophageal reflux disease without esophagitis  Assessment & Plan:  Stable on Prilosec 40 mg BID      6. Hypokalemia  -     potassium chloride ER (K-TAB) 20 MEQ tablet controlled-release ER tablet; Take 1 tablet by mouth 2 (Two) Times a Day.  Dispense: 180 tablet; Refill: 3    Other orders  -     baclofen (LIORESAL) 10 MG tablet; Take 1 tablet by mouth Every Night.  Dispense: 90 tablet; Refill: 2           I spent 30 minutes caring for Krystyna on this date of service. This time includes time spent by me in the following activities:preparing for the visit, reviewing tests, obtaining and/or reviewing a separately obtained history, performing a medically appropriate examination and/or evaluation , counseling and educating the patient/family/caregiver, ordering medications, tests, or procedures, documenting information in the medical record, and care coordination  Follow Up     Return in about 6 months (around 9/29/2024) for Annual physical.  Patient was given instructions and counseling regarding her condition or for health maintenance advice. Please see specific information pulled into the AVS if appropriate.

## 2024-03-29 NOTE — TELEPHONE ENCOUNTER
Tried calling patient back to let her know this was resent to the mail order pharmacy, phone rang twice and then went to dead air.

## 2024-03-29 NOTE — TELEPHONE ENCOUNTER
Caller: Krystyna Fabian    Relationship: Self    Best call back number: [phone] (Mobile)     Requested Prescriptions:   Requested Prescriptions     Pending Prescriptions Disp Refills    potassium chloride ER (K-TAB) 20 MEQ tablet controlled-release ER tablet 180 tablet 3     Sig: Take 1 tablet by mouth 2 (Two) Times a Day.    baclofen (LIORESAL) 10 MG tablet 90 tablet 2     Sig: Take 1 tablet by mouth Every Night.        Pharmacy where request should be sent: [address] [phone] [facility] [phone]      Last office visit with prescribing clinician: 3/29/2024   Last telemedicine visit with prescribing clinician: Visit date not found   Next office visit with prescribing clinician: 10/2/2024     Additional details provided by patient: PATIENT ASKED TO HAVE THESE REFILLS RESENT TO HER MAIL ORDER PHARMACY ASAP BUT ALL OTHER MEDICATIONS FROM TODAY DO STAY AT THE LOCAL PHARMACY    PLEASE ADVISE PATIENT WHEN THESE HAVE BEEN SENT TO MAIL ORDER SO SHE CAN ARRANGE TO HAVE THEM SENT TO HER HOME    Does the patient have less than a 3 day supply:  [x] Yes  [] No    Would you like a call back once the refill request has been completed: [x] Yes [] No    If the office needs to give you a call back, can they leave a voicemail: [x] Yes [] No    Adrian Garvey   03/29/24 08:43 EDT

## 2024-04-05 ENCOUNTER — TELEPHONE (OUTPATIENT)
Dept: FAMILY MEDICINE CLINIC | Facility: CLINIC | Age: 74
End: 2024-04-05
Payer: COMMERCIAL

## 2024-04-05 RX ORDER — FLUCONAZOLE 150 MG/1
150 TABLET ORAL ONCE
Qty: 2 TABLET | Refills: 0 | Status: SHIPPED | OUTPATIENT
Start: 2024-04-05 | End: 2024-04-05

## 2024-04-05 NOTE — TELEPHONE ENCOUNTER
Caller: Rui Krystyna ARPIT    Relationship: Self    Best call back number: [phone]    What medication are you requesting: YEAST INFECTION     What are your current symptoms: PATIENT SAID SHE'S HAD THESE BEFORE WHEN TAKING AMOXICILLIN AND KNOWS SHE HAS ONE NOW     How long have you been experiencing symptoms: 3 DAYS    Have you had these symptoms before:    [x] Yes  [] No    Have you been treated for these symptoms before:   [x] Yes  [] No    If a prescription is needed, what is your preferred pharmacy and phone number[id number] [address] [facility] AT [facility] - [phone] [facility] [phone]      Additional notes:    PATIENT CALLED AND STATED SHE THINKS SHE HAS A YEAST INFECTION AND WOULD LIKE A MEDICATION SENT IN FOR IT

## 2024-04-18 DIAGNOSIS — F51.01 PRIMARY INSOMNIA: ICD-10-CM

## 2024-04-18 RX ORDER — ZOLPIDEM TARTRATE 10 MG/1
10 TABLET ORAL NIGHTLY PRN
Qty: 30 TABLET | Refills: 1 | Status: SHIPPED | OUTPATIENT
Start: 2024-04-18

## 2024-04-26 DIAGNOSIS — J40 BRONCHITIS: ICD-10-CM

## 2024-04-26 RX ORDER — ALBUTEROL SULFATE 90 UG/1
2 AEROSOL, METERED RESPIRATORY (INHALATION) EVERY 4 HOURS PRN
Qty: 8.5 G | Refills: 0 | Status: SHIPPED | OUTPATIENT
Start: 2024-04-26

## 2024-04-28 DIAGNOSIS — F51.01 PRIMARY INSOMNIA: ICD-10-CM

## 2024-04-29 RX ORDER — QUETIAPINE FUMARATE 50 MG/1
100 TABLET, FILM COATED ORAL
Qty: 180 TABLET | Refills: 3 | Status: SHIPPED | OUTPATIENT
Start: 2024-04-29

## 2024-05-06 RX ORDER — OMEPRAZOLE 40 MG/1
40 CAPSULE, DELAYED RELEASE ORAL 2 TIMES DAILY
Qty: 180 CAPSULE | Refills: 3 | Status: SHIPPED | OUTPATIENT
Start: 2024-05-06

## 2024-06-13 DIAGNOSIS — F51.01 PRIMARY INSOMNIA: ICD-10-CM

## 2024-06-13 RX ORDER — ZOLPIDEM TARTRATE 10 MG/1
10 TABLET ORAL NIGHTLY PRN
Qty: 30 TABLET | Refills: 0 | Status: SHIPPED | OUTPATIENT
Start: 2024-06-13

## 2024-06-17 ENCOUNTER — TELEPHONE (OUTPATIENT)
Dept: FAMILY MEDICINE CLINIC | Facility: CLINIC | Age: 74
End: 2024-06-17
Payer: COMMERCIAL

## 2024-06-17 NOTE — TELEPHONE ENCOUNTER
Attempted to call pt r/t overdue mammo, no answer: per verbal left msg for pt asking if completed or has appt scheduled

## 2024-07-01 ENCOUNTER — CLINICAL SUPPORT (OUTPATIENT)
Dept: FAMILY MEDICINE CLINIC | Facility: CLINIC | Age: 74
End: 2024-07-01
Payer: COMMERCIAL

## 2024-07-01 ENCOUNTER — TELEPHONE (OUTPATIENT)
Dept: FAMILY MEDICINE CLINIC | Facility: CLINIC | Age: 74
End: 2024-07-01

## 2024-07-01 DIAGNOSIS — R30.0 DYSURIA: Primary | ICD-10-CM

## 2024-07-01 LAB
BILIRUB BLD-MCNC: NEGATIVE MG/DL
CLARITY, POC: ABNORMAL
COLOR UR: YELLOW
GLUCOSE UR STRIP-MCNC: NEGATIVE MG/DL
KETONES UR QL: NEGATIVE
LEUKOCYTE EST, POC: ABNORMAL
NITRITE UR-MCNC: NEGATIVE MG/ML
PH UR: 5.5 [PH] (ref 5–8)
PROT UR STRIP-MCNC: ABNORMAL MG/DL
RBC # UR STRIP: ABNORMAL /UL
SP GR UR: 1.01 (ref 1–1.03)
UROBILINOGEN UR QL: NORMAL

## 2024-07-01 PROCEDURE — 87086 URINE CULTURE/COLONY COUNT: CPT | Performed by: NURSE PRACTITIONER

## 2024-07-01 PROCEDURE — 87186 SC STD MICRODIL/AGAR DIL: CPT | Performed by: NURSE PRACTITIONER

## 2024-07-01 RX ORDER — NITROFURANTOIN 25; 75 MG/1; MG/1
100 CAPSULE ORAL 2 TIMES DAILY
Qty: 14 CAPSULE | Refills: 0 | Status: SHIPPED | OUTPATIENT
Start: 2024-07-01

## 2024-07-01 NOTE — PROGRESS NOTES
Macrobid sent to patient's pharmacy.  Increase water intake, minimize carbonation, citrus foods/beverages and caffeine.  Will notify patient of culture results once available.

## 2024-07-01 NOTE — TELEPHONE ENCOUNTER
Pt came in to leave a urine sample because she is having severe flank pain and trouble urinating. She said this has been going on for a couple of weeks and seems to be getting worse. Urine results sent in separate message. Also sent for culture. Please advise.

## 2024-07-01 NOTE — PROGRESS NOTES
Pt came into the office today to leave a urine sample due to flank pain and dysuria. Urine sample was collected and results sent to provider for review.    KIARA Strauss

## 2024-07-02 RX ORDER — MONTELUKAST SODIUM 10 MG/1
10 TABLET ORAL NIGHTLY
Qty: 90 TABLET | Refills: 3 | Status: SHIPPED | OUTPATIENT
Start: 2024-07-02

## 2024-07-03 ENCOUNTER — TELEPHONE (OUTPATIENT)
Dept: FAMILY MEDICINE CLINIC | Facility: CLINIC | Age: 74
End: 2024-07-03

## 2024-07-03 LAB — BACTERIA SPEC AEROBE CULT: ABNORMAL

## 2024-07-03 RX ORDER — FLUCONAZOLE 150 MG/1
150 TABLET ORAL ONCE
Qty: 2 TABLET | Refills: 0 | Status: SHIPPED | OUTPATIENT
Start: 2024-07-03 | End: 2024-07-03

## 2024-07-03 NOTE — TELEPHONE ENCOUNTER
Caller: Krystyna Fabian    Relationship: Self    Best call back number:     What medication are you requesting: SOMETHING FOR A YEAST INFECTION    What are your current symptoms:     How long have you been experiencing symptoms:     Have you had these symptoms before:    [x] Yes  [] No    Have you been treated for these symptoms before:   [x] Yes  [] No    If a prescription is needed, what is your preferred pharmacy and phone number: [id number] [address] AT [facility] & [facility] [phone] [facility] [phone]      Additional notes: PATIENT HAS BEEN TAKING ANTIBIOTICS AND NOW SHE HAS A YEAST INFECTION.

## 2024-07-08 ENCOUNTER — TELEPHONE (OUTPATIENT)
Dept: FAMILY MEDICINE CLINIC | Facility: CLINIC | Age: 74
End: 2024-07-08
Payer: COMMERCIAL

## 2024-07-08 NOTE — TELEPHONE ENCOUNTER
Left detailed message for patient and let her know that she may come in to leave a urine sample as long as she has completed the antibiotic. Advised to call the office with any other questions.

## 2024-07-08 NOTE — TELEPHONE ENCOUNTER
Caller: Krystyna Fabian    Relationship: Self    Best call back number: [phone]    What was the call regarding: PLEASE CALL PATIENT TO LET HER KNOW IF SHE CAN COMPLETE A URINE TEST IN OFFICE TODAY TO MAKE SURE HER UTI IS GONE. PLEASE ADVISE ASAP.

## 2024-07-10 ENCOUNTER — LAB (OUTPATIENT)
Dept: FAMILY MEDICINE CLINIC | Facility: CLINIC | Age: 74
End: 2024-07-10
Payer: COMMERCIAL

## 2024-07-10 ENCOUNTER — CLINICAL SUPPORT (OUTPATIENT)
Dept: FAMILY MEDICINE CLINIC | Facility: CLINIC | Age: 74
End: 2024-07-10
Payer: COMMERCIAL

## 2024-07-10 ENCOUNTER — TELEPHONE (OUTPATIENT)
Dept: FAMILY MEDICINE CLINIC | Facility: CLINIC | Age: 74
End: 2024-07-10

## 2024-07-10 DIAGNOSIS — F51.01 PRIMARY INSOMNIA: ICD-10-CM

## 2024-07-10 DIAGNOSIS — Z87.440 HISTORY OF UTI: Primary | ICD-10-CM

## 2024-07-10 LAB
BILIRUB BLD-MCNC: NEGATIVE MG/DL
CLARITY, POC: CLEAR
COLOR UR: YELLOW
GLUCOSE UR STRIP-MCNC: NEGATIVE MG/DL
KETONES UR QL: NEGATIVE
LEUKOCYTE EST, POC: NEGATIVE
NITRITE UR-MCNC: NEGATIVE MG/ML
PH UR: 5.5 [PH] (ref 5–8)
PROT UR STRIP-MCNC: NEGATIVE MG/DL
RBC # UR STRIP: NEGATIVE /UL
SP GR UR: 1.01 (ref 1–1.03)
UROBILINOGEN UR QL: NORMAL

## 2024-07-10 PROCEDURE — 81002 URINALYSIS NONAUTO W/O SCOPE: CPT | Performed by: NURSE PRACTITIONER

## 2024-07-10 RX ORDER — ZOLPIDEM TARTRATE 10 MG/1
10 TABLET ORAL NIGHTLY PRN
Qty: 30 TABLET | Refills: 1 | Status: SHIPPED | OUTPATIENT
Start: 2024-07-10

## 2024-07-10 NOTE — TELEPHONE ENCOUNTER
Pt came in for a repeat urine after finishing her antibiotic to be sure the UTI has cleared. She said she feels like most all of her symptoms are gone and just wanted to be sure there was no infection. Results sent in a separate message.

## 2024-07-10 NOTE — PROGRESS NOTES
Pt came in today for a repeat urine sample after completing antibiotic. Urine was collected and results sent to provider for review.     KIARA Strauss

## 2024-07-22 ENCOUNTER — TELEPHONE (OUTPATIENT)
Dept: FAMILY MEDICINE CLINIC | Facility: CLINIC | Age: 74
End: 2024-07-22

## 2024-07-22 ENCOUNTER — CLINICAL SUPPORT (OUTPATIENT)
Dept: FAMILY MEDICINE CLINIC | Facility: CLINIC | Age: 74
End: 2024-07-22
Payer: COMMERCIAL

## 2024-07-22 DIAGNOSIS — N39.0 RECURRENT UTI: ICD-10-CM

## 2024-07-22 DIAGNOSIS — R30.0 DYSURIA: Primary | ICD-10-CM

## 2024-07-22 LAB
BILIRUB BLD-MCNC: NEGATIVE MG/DL
CLARITY, POC: ABNORMAL
COLOR UR: ABNORMAL
GLUCOSE UR STRIP-MCNC: NEGATIVE MG/DL
KETONES UR QL: NEGATIVE
LEUKOCYTE EST, POC: ABNORMAL
NITRITE UR-MCNC: POSITIVE MG/ML
PH UR: 5.5 [PH] (ref 5–8)
PROT UR STRIP-MCNC: ABNORMAL MG/DL
RBC # UR STRIP: ABNORMAL /UL
SP GR UR: 1.02 (ref 1–1.03)
UROBILINOGEN UR QL: NORMAL

## 2024-07-22 PROCEDURE — 87086 URINE CULTURE/COLONY COUNT: CPT | Performed by: NURSE PRACTITIONER

## 2024-07-22 PROCEDURE — 81002 URINALYSIS NONAUTO W/O SCOPE: CPT | Performed by: NURSE PRACTITIONER

## 2024-07-22 PROCEDURE — 87186 SC STD MICRODIL/AGAR DIL: CPT | Performed by: NURSE PRACTITIONER

## 2024-07-22 RX ORDER — LEVOFLOXACIN 500 MG/1
500 TABLET, FILM COATED ORAL DAILY
Qty: 7 TABLET | Refills: 0 | Status: SHIPPED | OUTPATIENT
Start: 2024-07-22

## 2024-07-22 NOTE — PROGRESS NOTES
Pt came into the office today because she is having UTI symptoms. Urine sample collected and results sent to provider for review.     KIARA Strauss

## 2024-07-22 NOTE — TELEPHONE ENCOUNTER
Pt came into the office today to leave a urine sample because her UTI symptoms have returned. She said a lot of pain with urination and frequency. Sent results of dip in a separate message.

## 2024-07-22 NOTE — TELEPHONE ENCOUNTER
Sending Levaquin to pharmacy, take x 7 days. If sx return again, need to consider referral to Urology

## 2024-07-24 LAB — BACTERIA SPEC AEROBE CULT: ABNORMAL

## 2024-07-24 RX ORDER — FLUCONAZOLE 150 MG/1
150 TABLET ORAL ONCE
Qty: 1 TABLET | Refills: 0 | Status: SHIPPED | OUTPATIENT
Start: 2024-07-24 | End: 2024-07-24

## 2024-09-04 DIAGNOSIS — F51.01 PRIMARY INSOMNIA: ICD-10-CM

## 2024-09-04 RX ORDER — ZOLPIDEM TARTRATE 10 MG/1
10 TABLET ORAL NIGHTLY PRN
Qty: 30 TABLET | Refills: 2 | Status: SHIPPED | OUTPATIENT
Start: 2024-09-04

## 2024-09-24 ENCOUNTER — OFFICE VISIT (OUTPATIENT)
Dept: FAMILY MEDICINE CLINIC | Facility: CLINIC | Age: 74
End: 2024-09-24
Payer: MEDICARE

## 2024-09-24 VITALS
WEIGHT: 178.8 LBS | OXYGEN SATURATION: 99 % | RESPIRATION RATE: 18 BRPM | DIASTOLIC BLOOD PRESSURE: 86 MMHG | BODY MASS INDEX: 31.68 KG/M2 | TEMPERATURE: 97.9 F | HEART RATE: 61 BPM | SYSTOLIC BLOOD PRESSURE: 128 MMHG | HEIGHT: 63 IN

## 2024-09-24 DIAGNOSIS — R10.31 RIGHT GROIN PAIN: ICD-10-CM

## 2024-09-24 DIAGNOSIS — K21.9 GASTROESOPHAGEAL REFLUX DISEASE WITHOUT ESOPHAGITIS: ICD-10-CM

## 2024-09-24 DIAGNOSIS — R10.10 PAIN OF UPPER ABDOMEN: ICD-10-CM

## 2024-09-24 DIAGNOSIS — Z98.890 HISTORY OF LUMBAR SURGERY: Primary | ICD-10-CM

## 2024-09-24 DIAGNOSIS — N39.0 FREQUENT UTI: ICD-10-CM

## 2024-09-24 DIAGNOSIS — M54.50 PAIN OF LUMBAR SPINE: ICD-10-CM

## 2024-09-24 LAB
BILIRUB BLD-MCNC: NEGATIVE MG/DL
CLARITY, POC: CLEAR
COLOR UR: YELLOW
GLUCOSE UR STRIP-MCNC: NEGATIVE MG/DL
KETONES UR QL: NEGATIVE
LEUKOCYTE EST, POC: NEGATIVE
NITRITE UR-MCNC: NEGATIVE MG/ML
PH UR: 6 [PH] (ref 5–8)
PROT UR STRIP-MCNC: NEGATIVE MG/DL
RBC # UR STRIP: NEGATIVE /UL
SP GR UR: 1.01 (ref 1–1.03)
UROBILINOGEN UR QL: NORMAL

## 2024-09-24 RX ORDER — PREDNISONE 10 MG/1
TABLET ORAL
Qty: 33 TABLET | Refills: 0 | Status: SHIPPED | OUTPATIENT
Start: 2024-09-24

## 2024-09-24 RX ORDER — VONOPRAZAN FUMARATE 13.36 MG/1
10 TABLET ORAL DAILY
Qty: 7 TABLET | Refills: 0 | COMMUNITY
Start: 2024-09-24

## 2024-10-02 ENCOUNTER — LAB (OUTPATIENT)
Dept: FAMILY MEDICINE CLINIC | Facility: CLINIC | Age: 74
End: 2024-10-02
Payer: MEDICARE

## 2024-10-02 ENCOUNTER — OFFICE VISIT (OUTPATIENT)
Dept: FAMILY MEDICINE CLINIC | Facility: CLINIC | Age: 74
End: 2024-10-02
Payer: MEDICARE

## 2024-10-02 VITALS
HEIGHT: 63 IN | WEIGHT: 179.8 LBS | DIASTOLIC BLOOD PRESSURE: 82 MMHG | OXYGEN SATURATION: 98 % | HEART RATE: 63 BPM | SYSTOLIC BLOOD PRESSURE: 128 MMHG | BODY MASS INDEX: 31.86 KG/M2

## 2024-10-02 DIAGNOSIS — Z00.00 ANNUAL PHYSICAL EXAM: Primary | ICD-10-CM

## 2024-10-02 DIAGNOSIS — E55.9 VITAMIN D DEFICIENCY: ICD-10-CM

## 2024-10-02 DIAGNOSIS — M54.50 PAIN OF LUMBAR SPINE: ICD-10-CM

## 2024-10-02 DIAGNOSIS — R10.31 RIGHT GROIN PAIN: ICD-10-CM

## 2024-10-02 DIAGNOSIS — R10.10 PAIN OF UPPER ABDOMEN: ICD-10-CM

## 2024-10-02 DIAGNOSIS — Z98.890 HISTORY OF LUMBAR SURGERY: ICD-10-CM

## 2024-10-02 DIAGNOSIS — K21.9 GASTROESOPHAGEAL REFLUX DISEASE WITHOUT ESOPHAGITIS: ICD-10-CM

## 2024-10-02 DIAGNOSIS — Z12.31 SCREENING MAMMOGRAM FOR BREAST CANCER: ICD-10-CM

## 2024-10-02 DIAGNOSIS — F41.9 ANXIETY: ICD-10-CM

## 2024-10-02 DIAGNOSIS — F51.01 PRIMARY INSOMNIA: ICD-10-CM

## 2024-10-02 DIAGNOSIS — R10.30 LOWER ABDOMINAL PAIN: ICD-10-CM

## 2024-10-02 LAB
BILIRUB UR QL STRIP: NEGATIVE
CLARITY UR: CLEAR
COLOR UR: YELLOW
DEPRECATED RDW RBC AUTO: 44.7 FL (ref 37–54)
ERYTHROCYTE [DISTWIDTH] IN BLOOD BY AUTOMATED COUNT: 13.1 % (ref 12.3–15.4)
GLUCOSE UR STRIP-MCNC: NEGATIVE MG/DL
HCT VFR BLD AUTO: 42.1 % (ref 34–46.6)
HGB BLD-MCNC: 13.2 G/DL (ref 12–15.9)
HGB UR QL STRIP.AUTO: NEGATIVE
HOLD SPECIMEN: NORMAL
KETONES UR QL STRIP: NEGATIVE
LEUKOCYTE ESTERASE UR QL STRIP.AUTO: ABNORMAL
MCH RBC QN AUTO: 29.2 PG (ref 26.6–33)
MCHC RBC AUTO-ENTMCNC: 31.4 G/DL (ref 31.5–35.7)
MCV RBC AUTO: 93.1 FL (ref 79–97)
NITRITE UR QL STRIP: POSITIVE
PH UR STRIP.AUTO: 6.5 [PH] (ref 5–8)
PLATELET # BLD AUTO: 284 10*3/MM3 (ref 140–450)
PMV BLD AUTO: 12.6 FL (ref 6–12)
PROT UR QL STRIP: NEGATIVE
RBC # BLD AUTO: 4.52 10*6/MM3 (ref 3.77–5.28)
SP GR UR STRIP: 1.01 (ref 1–1.03)
UROBILINOGEN UR QL STRIP: ABNORMAL
WBC NRBC COR # BLD AUTO: 15.66 10*3/MM3 (ref 3.4–10.8)

## 2024-10-02 PROCEDURE — 87077 CULTURE AEROBIC IDENTIFY: CPT | Performed by: NURSE PRACTITIONER

## 2024-10-02 PROCEDURE — 84443 ASSAY THYROID STIM HORMONE: CPT | Performed by: NURSE PRACTITIONER

## 2024-10-02 PROCEDURE — 81001 URINALYSIS AUTO W/SCOPE: CPT | Performed by: NURSE PRACTITIONER

## 2024-10-02 PROCEDURE — 87086 URINE CULTURE/COLONY COUNT: CPT | Performed by: NURSE PRACTITIONER

## 2024-10-02 PROCEDURE — 87186 SC STD MICRODIL/AGAR DIL: CPT

## 2024-10-02 PROCEDURE — 80053 COMPREHEN METABOLIC PANEL: CPT | Performed by: NURSE PRACTITIONER

## 2024-10-02 PROCEDURE — 36415 COLL VENOUS BLD VENIPUNCTURE: CPT | Performed by: NURSE PRACTITIONER

## 2024-10-02 PROCEDURE — 83036 HEMOGLOBIN GLYCOSYLATED A1C: CPT | Performed by: NURSE PRACTITIONER

## 2024-10-02 PROCEDURE — 85027 COMPLETE CBC AUTOMATED: CPT | Performed by: NURSE PRACTITIONER

## 2024-10-02 PROCEDURE — 82306 VITAMIN D 25 HYDROXY: CPT | Performed by: NURSE PRACTITIONER

## 2024-10-02 PROCEDURE — 80061 LIPID PANEL: CPT | Performed by: NURSE PRACTITIONER

## 2024-10-02 NOTE — PROGRESS NOTES
"Chief Complaint  Annual Exam (Is fasting for labs), Abdominal Pain (Still having stomach pain ), and Back Pain (Lower, 8/10)    Subjective        Krystyna Fabian presents to [facility]  History of Present Illness    Patient presents for Annual Exam. PMH includes insomnia, anxiety, GERD     Patient has insomnia, taking Seroquel 50 mg nightly and Ambien 10 mg nightly for insomnia.  She is prescribed Wellbutrin  mg daily for anxiety - weaning herself off. Patient reports sx stable, has no acute complaints.      Patient is c/o abdominal pain.  She was seen on 9/24 complaints of upper abdominal tightness, like a belt and increasing GERD.  Patient was started on Voquezna and a KUB was ordered which showed increased gas pattern without constipation or other abnormalities.  She denies difficulty swallowing but feels like food is getting stuck in the epigastric region - causes bloating and tightness. No change in discomfort since starting Voquezna. Patient has tried and failed Prilosec but having to take BID.    Patient also having right sided low back pain that is constant, sharp, burning and radiates into the right groin.  She has a history of lumbar surgery, denies recent injury.  Pain reportedly started around the same time as urinary infections.  Patient was treated with tapering prednisone and an x-ray of her L-spine was ordered.  L-spine x-ray showed postsurgical changes, no hardware failure or loosening, there is retrolisthesis of L2 on L3 about 6 mm, this is where the L2 has moved forward onto the L3, there is also moderate degenerative disc disease.  Patient was advised to follow-up with Dr. TAYLOR - nik see until December.       Objective   Vital Signs:  /82 (BP Location: Left arm, Patient Position: Sitting, Cuff Size: Adult)   Pulse 63   Ht 160 cm (63\")   Wt 81.6 kg (179 lb 12.8 oz)   SpO2 98%   BMI 31.85 kg/m²   Estimated body mass index is 31.85 kg/m² as " "calculated from the following:    Height as of this encounter: 160 cm (63\").    Weight as of this encounter: 81.6 kg (179 lb 12.8 oz).            Physical Exam  Constitutional:       Appearance: Normal appearance.   HENT:      Head: Normocephalic.   Cardiovascular:      Rate and Rhythm: Normal rate and regular rhythm.   Pulmonary:      Effort: Pulmonary effort is normal.      Breath sounds: Normal breath sounds.   Abdominal:      General: Abdomen is flat. Bowel sounds are normal.      Palpations: Abdomen is soft.      Tenderness: There is abdominal tenderness in the suprapubic area.   Musculoskeletal:         General: Normal range of motion.      Cervical back: Neck supple.        Back:       Right lower leg: No edema.      Left lower leg: No edema.   Skin:     General: Skin is warm and dry.   Neurological:      Mental Status: She is alert and oriented to person, place, and time.      Gait: Gait is intact.   Psychiatric:         Attention and Perception: Attention normal.         Mood and Affect: Mood normal.         Speech: Speech normal.        Result Review :      Data reviewed : Radiologic studies KUB/L-spine x-ray           Assessment and Plan   Diagnoses and all orders for this visit:    1. Annual physical exam (Primary)  Assessment & Plan:  Labs today  Routine vision and dental screenings advised  Flu shot deferred  Mammogram ordered  DEXA scan deferred    Orders:  -     CBC (No Diff)  -     Comprehensive Metabolic Panel  -     Hemoglobin A1c  -     Lipid Panel  -     TSH    2. Vitamin D deficiency  -     Vitamin D,25-Hydroxy    3. History of lumbar surgery    4. Pain of lumbar spine  Assessment & Plan:  Reviewed x-ray  Keep follow up with Spine    Orders:  -     Cancel: CT Abdomen Pelvis With Contrast; Future  -     CT Abdomen Pelvis With Contrast; Future    5. Right groin pain  -     Cancel: CT Abdomen Pelvis With Contrast; Future  -     Urinalysis With Culture If Indicated - Urine, Clean Catch; Future  -   "   CT Abdomen Pelvis With Contrast; Future    6. Pain of upper abdomen  Assessment & Plan:  Continue Voquezna   Refer to GI  Reviewed KUB    Orders:  -     Ambulatory Referral to Gastroenterology    7. Gastroesophageal reflux disease without esophagitis  -     Ambulatory Referral to Gastroenterology    8. Primary insomnia  Assessment & Plan:  Stable on Seroquel and Ambien      9. Anxiety  Assessment & Plan:  Stable, weaning Wellbutrin      10. Lower abdominal pain  -     Cancel: CT Abdomen Pelvis With Contrast; Future  -     CT Abdomen Pelvis With Contrast; Future    11. Screening mammogram for breast cancer  -     Mammo Screening Digital Tomosynthesis Bilateral With CAD; Future           I spent 35 minutes caring for Krystyna on this date of service. This time includes time spent by me in the following activities:preparing for the visit, reviewing tests, obtaining and/or reviewing a separately obtained history, performing a medically appropriate examination and/or evaluation , counseling and educating the patient/family/caregiver, ordering medications, tests, or procedures, referring and communicating with other health care professionals , documenting information in the medical record, and care coordination  Follow Up   Return in about 4 weeks (around 10/30/2024) for Abdominal pain/back pain.  Patient was given instructions and counseling regarding her condition or for health maintenance advice. Please see specific information pulled into the AVS if appropriate.     Counseling was given to patient for the following topics: diagnostic results, instructions for management, risk factor reductions, prognosis, patient and family education, impressions, risks and benefits of treatment options, and importance of treatment compliance . Total time of the encounter was 30 minutes and 5 minutes was spent counseling.

## 2024-10-02 NOTE — ASSESSMENT & PLAN NOTE
Labs today  Routine vision and dental screenings advised  Flu shot deferred  Mammogram ordered  DEXA scan deferred

## 2024-10-03 LAB
25(OH)D3 SERPL-MCNC: 23 NG/ML (ref 30–100)
ALBUMIN SERPL-MCNC: 3.9 G/DL (ref 3.5–5.2)
ALBUMIN/GLOB SERPL: 1.6 G/DL
ALP SERPL-CCNC: 78 U/L (ref 39–117)
ALT SERPL W P-5'-P-CCNC: 14 U/L (ref 1–33)
ANION GAP SERPL CALCULATED.3IONS-SCNC: 12.5 MMOL/L (ref 5–15)
AST SERPL-CCNC: 15 U/L (ref 1–32)
BACTERIA UR QL AUTO: ABNORMAL /HPF
BILIRUB SERPL-MCNC: 0.8 MG/DL (ref 0–1.2)
BUN SERPL-MCNC: 13 MG/DL (ref 8–23)
BUN/CREAT SERPL: 16.5 (ref 7–25)
CALCIUM SPEC-SCNC: 8.6 MG/DL (ref 8.6–10.5)
CHLORIDE SERPL-SCNC: 108 MMOL/L (ref 98–107)
CHOLEST SERPL-MCNC: 174 MG/DL (ref 0–200)
CO2 SERPL-SCNC: 25.5 MMOL/L (ref 22–29)
CREAT SERPL-MCNC: 0.79 MG/DL (ref 0.57–1)
EGFRCR SERPLBLD CKD-EPI 2021: 79.6 ML/MIN/1.73
GLOBULIN UR ELPH-MCNC: 2.5 GM/DL
GLUCOSE SERPL-MCNC: 78 MG/DL (ref 65–99)
HBA1C MFR BLD: 4.9 % (ref 4.8–5.6)
HDLC SERPL-MCNC: 54 MG/DL (ref 40–60)
HYALINE CASTS UR QL AUTO: ABNORMAL /LPF
LDLC SERPL CALC-MCNC: 94 MG/DL (ref 0–100)
LDLC/HDLC SERPL: 1.67 {RATIO}
POTASSIUM SERPL-SCNC: 2.8 MMOL/L (ref 3.5–5.2)
PROT SERPL-MCNC: 6.4 G/DL (ref 6–8.5)
RBC # UR STRIP: ABNORMAL /HPF
REF LAB TEST METHOD: ABNORMAL
SODIUM SERPL-SCNC: 146 MMOL/L (ref 136–145)
SQUAMOUS #/AREA URNS HPF: ABNORMAL /HPF
TRIGL SERPL-MCNC: 149 MG/DL (ref 0–150)
TSH SERPL DL<=0.05 MIU/L-ACNC: 2.42 UIU/ML (ref 0.27–4.2)
VLDLC SERPL-MCNC: 26 MG/DL (ref 5–40)
WBC # UR STRIP: ABNORMAL /HPF

## 2024-10-03 RX ORDER — LEVOFLOXACIN 500 MG/1
500 TABLET, FILM COATED ORAL DAILY
Qty: 7 TABLET | Refills: 0 | Status: SHIPPED | OUTPATIENT
Start: 2024-10-03

## 2024-10-03 RX ORDER — POTASSIUM CHLORIDE 1500 MG/1
20 TABLET, EXTENDED RELEASE ORAL 3 TIMES DAILY
Qty: 270 TABLET | Refills: 0 | Status: SHIPPED | OUTPATIENT
Start: 2024-10-03 | End: 2025-01-01

## 2024-10-03 NOTE — PROGRESS NOTES
Please call patient ASAP with these results.  Your vitamin D level is very low, make sure you are taking at least 2000 units of vitamin D3 daily.  Your potassium is critically low.  Are you taking your potassium daily?  Your white blood cell count is elevated, consistent with an infection.  I am going to go ahead and put you on antibiotics based on this and the fact that there is still something going on in your urine.  The antibiotic I am putting her on is Levaquin which can interact with her Seroquel so her Seroquel needs to be held while taking this medication.

## 2024-10-03 NOTE — PROGRESS NOTES
I am going to increase her potassium to 3 times daily and she needs to start that today as well as increasing potassium rich foods.

## 2024-10-04 LAB — BACTERIA SPEC AEROBE CULT: ABNORMAL

## 2024-10-08 ENCOUNTER — TELEPHONE (OUTPATIENT)
Dept: FAMILY MEDICINE CLINIC | Facility: CLINIC | Age: 74
End: 2024-10-08
Payer: MEDICARE

## 2024-10-08 RX ORDER — FLUCONAZOLE 150 MG/1
150 TABLET ORAL ONCE
Qty: 1 TABLET | Refills: 0 | Status: SHIPPED | OUTPATIENT
Start: 2024-10-08 | End: 2024-10-08

## 2024-10-08 NOTE — TELEPHONE ENCOUNTER
Caller: Krystyna Fabian    Relationship: Self    Best call back number: [phone]    What medication are you requesting:     What are your current symptoms: YEAST INFECTION    How long have you been experiencing symptoms: SINCE STARTING ANTIBIOTIC     Have you had these symptoms before:    [x] Yes  [] No    Have you been treated for these symptoms before:   [x] Yes  [] No    If a prescription is needed, what is your preferred pharmacy and phone number:        [id number] [address] [facility] AT [facility] [phone] [facility] [phone] FX        Additional notes: PATIENT ALSO WANTED TO LET LIBBY KNOW THAT SHE DOES HAVE HER CT SCAN SCHEDULED WITH [facility]         DELETE AFTER READING TO PATIENT: “Thank you for sharing this information with me. I will send a message to the clinical team. Please allow 48 hours for the clinical staff to follow up on this request.”

## 2024-10-08 NOTE — TELEPHONE ENCOUNTER
She is currently holding the Seroquel while she is on the antibiotic and is aware not to take these medications with the Seroquel. She has tried OTC treatment in the past and it was ineffective.

## 2024-10-24 DIAGNOSIS — R10.30 LOWER ABDOMINAL PAIN: ICD-10-CM

## 2024-10-24 DIAGNOSIS — K62.89 RECTAL MASS: Primary | ICD-10-CM

## 2024-11-01 ENCOUNTER — OFFICE VISIT (OUTPATIENT)
Age: 74
End: 2024-11-01
Payer: MEDICARE

## 2024-11-01 VITALS
RESPIRATION RATE: 22 BRPM | TEMPERATURE: 98.4 F | DIASTOLIC BLOOD PRESSURE: 74 MMHG | HEIGHT: 63 IN | BODY MASS INDEX: 30.99 KG/M2 | OXYGEN SATURATION: 99 % | SYSTOLIC BLOOD PRESSURE: 115 MMHG | HEART RATE: 67 BPM | WEIGHT: 174.9 LBS

## 2024-11-01 DIAGNOSIS — R93.5 ABNORMAL CT OF THE ABDOMEN: Primary | ICD-10-CM

## 2024-11-01 RX ORDER — CLONAZEPAM 0.5 MG/1
0.5 TABLET ORAL 2 TIMES DAILY PRN
COMMUNITY
End: 2024-11-01

## 2024-11-02 ENCOUNTER — DOCUMENTATION (OUTPATIENT)
Age: 74
End: 2024-11-02
Payer: MEDICARE

## 2024-11-02 RX ORDER — SODIUM, POTASSIUM,MAG SULFATES 17.5-3.13G
2 SOLUTION, RECONSTITUTED, ORAL ORAL EVERY 12 HOURS
Qty: 177 ML | Refills: 0 | Status: SHIPPED | OUTPATIENT
Start: 2024-11-02

## 2024-11-03 NOTE — PROGRESS NOTES
Colorectal Surgery Consultation Note    ID:  Krystyna Fabian;   : 1950  DATE OF VISIT: 11/3/2024    Chief Complaint  new patient  (Rectal mass. Ref dean )       History of Present Illness  Krystyna Fabian is a 74 y.o. female who I was asked to see in consultation by Dr. Manju marques. provider found to evaluate for abnormal CT findings which showed a rectal mass extending to the perineum.    Patient denies any pelvic pain, protrusion of tissue or mass, or bleeding per rectum.  She denies any weight loss.  She reports significant constipation that has been going on for over 20 years.  Patient denies any fecal incontinence.    Her last colonoscopy was over 10 years ago.    Her CT abdomen pelvis that was obtained for abdominal pain showed low rectal thickening versus mass extending to the perineum.      Past Medical History  Past Medical History:   Diagnosis Date    Acute bronchitis     Allergic rhinitis     Anxiety     Arthritis     knee and neck    Autoimmune disease     no difinitive dx    B12 deficiency     Basal cell carcinoma (BCC) of back     chest and neck    Bursitis of both hips     C. difficile colitis 2013    Cancer     Carpal tunnel syndrome     right    Cholelithiasis 2004    Removed    Clostridium difficile colitis 2013    DDD (degenerative disc disease), lumbar     Fibromyalgia     GERD (gastroesophageal reflux disease)     Hx of migraine headaches     once- with erythromycin    Hypotension     IBS (irritable bowel syndrome)     IBS (irritable bowel syndrome)     Insomnia     Leg cramps     Paresthesia     left leg between knee and ankle    Peripheral edema     Pulmonary hypertension     2017 pressure 30-40-sleep study negative     Past Surgical History  Past Surgical History:   Procedure Laterality Date    ABDOMINOPLASTY      CHOLECYSTECTOMY      COLONOSCOPY      2013    EPIDURAL      FRACTURE SURGERY  2017    Left wrist    LUMBAR DISC SURGERY      anterior    LUMBAR SPINE SURGERY       "fx spine with cement and cage    LUMBAR SPINE SURGERY      second surgery due to fall    PLANTAR FASCIA RELEASE Right 11/09/2020    Procedure: ENDOSCOPIC PLANTAR FASCIOTOMY;  Surgeon: GERRI Ro DPM;  Location: [facility] MAIN OR;  Service: Podiatry;  Laterality: Right;    PLANTAR FASCIA RELEASE Left 09/09/2022    Procedure: PLANTAR FASCIOTOMY;  Surgeon: GERRI Ro DPM;  Location: [facility] MAIN OR;  Service: Podiatry;  Laterality: Left;    THIGH LIFT      TOENAIL EXCISION Left 09/09/2022    Procedure: NAIL BED REMOVAL/REVISION;  Surgeon: GERRI Ro DPM;  Location: [facility] MAIN OR;  Service: Podiatry;  Laterality: Left;    TUBAL ABDOMINAL LIGATION       Family History  Family History   Problem Relation Age of Onset    Osteoarthritis Mother     Breast cancer Mother     Cancer Mother         Breast cancer    Kidney disease Mother     Diabetes Sister     Arthritis Other     Cancer Other     Obesity Other     Kidney disease Other     Heart disease Other     Stomach cancer Father     Cancer Father     Heart disease Maternal Grandmother     Diabetes Paternal Grandfather      No colorectal cancer history in immediate family members.  Social History  Social History     Tobacco Use    Smoking status: Never     Passive exposure: Never    Smokeless tobacco: Never   Vaping Use    Vaping status: Never Used   Substance Use Topics    Alcohol use: Never    Drug use: Never     Medication List  @medcurrent@  Allergies  Allergies   Allergen Reactions    Butorphanol Itching     Stadol  (felt like \"bugs were crawling all over me\")     Review of Systems  All systems reviewed and are otherwise negative, pertinent positives noted in the HPI.    Physical Exam  General:  No acute distress  Head: Normocephalic, atraumatic  Neuro: Alert and oriented    Abdomen:  Soft, non-tender, non-distended, no masses, no hernias, no hepatomegaly, no splenomegaly. No abnormal, audible bowel sounds.  No groin lymphadenopathy    External " anorectal exam: mild skin irritation, no external tags, no perineal mass noted.  Perineal descent with Valsalva.  She has internal rectal prolapse noted without full-thickness rectal prolapse.  No protrusion of mass or tissue.  Digital rectal: no tenderness with exam, no palpable masses, tone is normal     Procedure  Proctoscopy: Prominent, internal hemorrhoids in the classic quadrants, internal rectal intussusception,  otherwise no other mucosal lesions to 10cm.Evaluation limited by rectal stool.     Assessment  - Abnormal CT findings showing rectal mass   - constipation and irregular bowel habits     Plan / Recommendations    1. Her CT scan shows thickening of the right wall of rectum extending  to anus. However, there was no palpable mass on my exam of proctoscopy. There was internal rectal prolapse that was seen, that is possible what correlates to her CT scan.     2. We will plan for diagnostic colonoscopy and possible biopsy.     3. Regarding her constipation, we will get sitz marker study and video defecography after colonoscopy.       I have also personally reviewed the CT scan images which show the rectal wall thickening/ mass   I have also reviewed her labs including normal TSH, low potassium of 2.6.     4. Follow up at the time of colonoscopy       Eugenio Rose MD  Colon and Rectal Surgery   [facility]

## 2024-11-04 ENCOUNTER — ANESTHESIA (OUTPATIENT)
Dept: GASTROENTEROLOGY | Facility: HOSPITAL | Age: 74
End: 2024-11-04
Payer: MEDICARE

## 2024-11-04 ENCOUNTER — HOSPITAL ENCOUNTER (OUTPATIENT)
Facility: HOSPITAL | Age: 74
Setting detail: HOSPITAL OUTPATIENT SURGERY
Discharge: HOME | End: 2024-11-04
Attending: STUDENT IN AN ORGANIZED HEALTH CARE EDUCATION/TRAINING PROGRAM | Admitting: STUDENT IN AN ORGANIZED HEALTH CARE EDUCATION/TRAINING PROGRAM
Payer: MEDICARE

## 2024-11-04 ENCOUNTER — ANESTHESIA EVENT (OUTPATIENT)
Dept: GASTROENTEROLOGY | Facility: HOSPITAL | Age: 74
End: 2024-11-04
Payer: MEDICARE

## 2024-11-04 VITALS
SYSTOLIC BLOOD PRESSURE: 123 MMHG | WEIGHT: 173.28 LBS | OXYGEN SATURATION: 100 % | RESPIRATION RATE: 14 BRPM | HEIGHT: 63 IN | HEART RATE: 68 BPM | BODY MASS INDEX: 30.7 KG/M2 | TEMPERATURE: 97.8 F | DIASTOLIC BLOOD PRESSURE: 78 MMHG

## 2024-11-04 DIAGNOSIS — R93.5 ABNORMAL CT OF THE ABDOMEN: ICD-10-CM

## 2024-11-04 LAB
BASE DEFICIT: ABNORMAL
BASE EXCESS BLDV CALC-SCNC: <0 MMOL/L (ref 0–3)
CA-I BLDA-SCNC: 1.17 MMOL/L (ref 1.12–1.32)
CO2 CONTENT VENOUS: 23 MMOL/L (ref 24–29)
GLUCOSE BLDC GLUCOMTR-MCNC: 83 MG/DL (ref 70–105)
HCO3 BLDV-SCNC: 21.7 MMOL/L (ref 23–28)
HCT VFR BLDA CALC: 43 % (ref 38–51)
HGB BLDA-MCNC: 14.6 G/DL (ref 12–17)
PCO2 BLDV: 40.5 MM HG (ref 41–51)
PH BLDV: 7.34 PH UNITS (ref 7.31–7.41)
PO2 BLDV: 35 MM HG (ref 35–42)
POTASSIUM BLDA-SCNC: 5 MMOL/L (ref 3.5–4.9)
SAO2 % BLDCOV: ABNORMAL %
SODIUM BLD-SCNC: 143 MMOL/L (ref 138–146)

## 2024-11-04 PROCEDURE — 25010000002 LIDOCAINE PF 1% 1 % SOLUTION: Performed by: NURSE ANESTHETIST, CERTIFIED REGISTERED

## 2024-11-04 PROCEDURE — 45380 COLONOSCOPY AND BIOPSY: CPT | Performed by: STUDENT IN AN ORGANIZED HEALTH CARE EDUCATION/TRAINING PROGRAM

## 2024-11-04 PROCEDURE — 82330 ASSAY OF CALCIUM: CPT

## 2024-11-04 PROCEDURE — 82803 BLOOD GASES ANY COMBINATION: CPT

## 2024-11-04 PROCEDURE — 84295 ASSAY OF SERUM SODIUM: CPT

## 2024-11-04 PROCEDURE — 82947 ASSAY GLUCOSE BLOOD QUANT: CPT

## 2024-11-04 PROCEDURE — 88305 TISSUE EXAM BY PATHOLOGIST: CPT | Performed by: STUDENT IN AN ORGANIZED HEALTH CARE EDUCATION/TRAINING PROGRAM

## 2024-11-04 PROCEDURE — 25010000002 PROPOFOL 200 MG/20ML EMULSION: Performed by: NURSE ANESTHETIST, CERTIFIED REGISTERED

## 2024-11-04 PROCEDURE — 84132 ASSAY OF SERUM POTASSIUM: CPT

## 2024-11-04 PROCEDURE — 25010000002 GLYCOPYRROLATE 0.2 MG/ML SOLUTION: Performed by: NURSE ANESTHETIST, CERTIFIED REGISTERED

## 2024-11-04 PROCEDURE — 85014 HEMATOCRIT: CPT

## 2024-11-04 PROCEDURE — 25810000003 SODIUM CHLORIDE 0.9 % SOLUTION: Performed by: NURSE ANESTHETIST, CERTIFIED REGISTERED

## 2024-11-04 RX ORDER — PROPOFOL 10 MG/ML
INJECTION, EMULSION INTRAVENOUS AS NEEDED
Status: DISCONTINUED | OUTPATIENT
Start: 2024-11-04 | End: 2024-11-04 | Stop reason: SURG

## 2024-11-04 RX ORDER — SODIUM CHLORIDE 9 MG/ML
INJECTION, SOLUTION INTRAVENOUS CONTINUOUS PRN
Status: DISCONTINUED | OUTPATIENT
Start: 2024-11-04 | End: 2024-11-04 | Stop reason: SURG

## 2024-11-04 RX ORDER — LIDOCAINE HYDROCHLORIDE 10 MG/ML
INJECTION, SOLUTION EPIDURAL; INFILTRATION; INTRACAUDAL; PERINEURAL AS NEEDED
Status: DISCONTINUED | OUTPATIENT
Start: 2024-11-04 | End: 2024-11-04 | Stop reason: SURG

## 2024-11-04 RX ORDER — GLYCOPYRROLATE 0.2 MG/ML
INJECTION INTRAMUSCULAR; INTRAVENOUS AS NEEDED
Status: DISCONTINUED | OUTPATIENT
Start: 2024-11-04 | End: 2024-11-04 | Stop reason: SURG

## 2024-11-04 RX ADMIN — SODIUM CHLORIDE: 9 INJECTION, SOLUTION INTRAVENOUS at 09:30

## 2024-11-04 RX ADMIN — PROPOFOL 80 MG: 10 INJECTION, EMULSION INTRAVENOUS at 09:37

## 2024-11-04 RX ADMIN — GLYCOPYRROLATE 0.1 MG: 0.2 INJECTION, SOLUTION INTRAMUSCULAR; INTRAVENOUS at 09:37

## 2024-11-04 RX ADMIN — PROPOFOL 150 MCG/KG/MIN: 10 INJECTION, EMULSION INTRAVENOUS at 09:38

## 2024-11-04 RX ADMIN — LIDOCAINE HYDROCHLORIDE 50 MG: 10 INJECTION, SOLUTION EPIDURAL; INFILTRATION; INTRACAUDAL; PERINEURAL at 09:37

## 2024-11-04 NOTE — ANESTHESIA POSTPROCEDURE EVALUATION
Patient: Krystyna Fabian    Procedure Summary       Date: 11/04/24 Room / Location: [facility] ENDOSCOPY 4 / [facility] ENDOSCOPY    Anesthesia Start: 0930 Anesthesia Stop: 0953    Procedure: COLONOSCOPY with rectal biopsy (Rectum) Diagnosis:       Abnormal CT of the abdomen      (Abnormal CT of the abdomen [R93.5])    Surgeons: Eugenio Rose MD Provider: Christen Cain MD    Anesthesia Type: general ASA Status: 2            Anesthesia Type: general    Vitals  Vitals Value Taken Time   /78 11/04/24 1025   Temp     Pulse 63 11/04/24 1029   Resp 14 11/04/24 1024   SpO2 100 % 11/04/24 1029   Vitals shown include unfiled device data.        Post Anesthesia Care and Evaluation    Patient location during evaluation: PACU  Patient participation: complete - patient participated  Level of consciousness: awake and alert  Pain management: satisfactory to patient    Airway patency: patent  Anesthetic complications: No anesthetic complications  PONV Status: none  Cardiovascular status: acceptable  Respiratory status: acceptable  Hydration status: acceptable

## 2024-11-04 NOTE — OP NOTE
[facility] Colorectal Surgery  Colonoscopy Examination     Name: Krystyna Fabian  : 1950  Date of Colonoscopy: 2024  Procedure: Diagnostic colonoscopy   Post procedure Dx: no rectal mass , internal rectal prolapse   Surgeon: Eugenio Rose MD   Anesthesia: Sedation   IV Fluids: refer to anesthesia record    Procedure Details:    Prior to the procedure, history and physical exam was performed. Patient's medication and allergies were reviewed. The risk and benefits of the procedure and sedation options and risks were discussed with the patient. All questions were answered and informed consent was obtained.    The patient was brought to the endoscopy suite and placed in the left lateral decubitus position. Conscious sedation was administered by the anesthesia team. Vital signs including blood pressure, heart rate, and oxygen saturation were monitored continuously throughout the procedure.    The colonoscope was introduced through the rectum and advanced through the entire colon under direct visualization. The scope was maneuvered carefully, and the mucosa of the rectum, sigmoid colon, descending colon, transverse colon, ascending colon, and cecum were thoroughly inspected. Adequate insufflation was maintained throughout the procedure for optimal visualization.    Findings:    Rectum: internal anal papillae, enlarged internal hemorrhoids, Normal appearance with no lesions,polyps,Internal rectal prolapse. Biopsy of the rectum was taken randomly.   Sigmoid colon: Mild scattered diverticulosis,  with no lesions,polyps, or abnormalities.                                                     Descending colon: Normal appearance with no lesions,polyps, or abnormalities.  Transverse colon: Normal appearance with no lesions,polyps, or abnormalities.  Ascending colon: Normal appearance with no lesions,polyps, or abnormalities.  Cecum: melanosis coli    Procedure Images:      Attached in a separate file.      Interventions:  During the procedure, random biopsies were taken with cold biopsy forceps from the rectum. Hemostasis was achieved successfully at the site of biopsies without any immediate complications.     Specimens:  The removed biopsies was retrieved and sent for histopathological examination to the pathology department for further analysis.    Recommendation:     Repeat colonoscopy in 10 years   Conclusion:  The diagnostic colonoscopy was completed successfully, and the entire colon was visualized without any complications. The patient tolerated the procedure well and was transferred to the recovery area in stable condition. Post-procedure instructions and follow-up were discussed with the patient and will be provided in written form.    Eugenio Rose MD  Colon and Rectal Surgery   [address], [address]  T: [phone]

## 2024-11-04 NOTE — DISCHARGE INSTRUCTIONS
A responsible adult should stay with you and you should rest quietly for the rest of the day.    Do not drink alcohol, drive, operate any heavy machinery or power tools or make any legal/important decisions for the next 24 hours.     Progress your diet as tolerated.  If you begin to experience severe pain, increased shortness of breath, racing heartbeat or a fever above 101 F, seek immediate medical attention.     Follow up with MD as instructed. Call office for results in 3 to 5 days if needed. Dr. Rose [phone]    Recommendation:      Repeat colonoscopy in 10 years   Conclusion:  The diagnostic colonoscopy was completed successfully, and the entire colon was visualized without any complications. The patient tolerated the procedure well and was transferred to the recovery area in stable condition. Post-procedure instructions and follow-up were discussed with the patient and will be provided in written form.     Eugenio Rose MD  Colon and Rectal Surgery   [facility]   [address] [place], [address]  T: [phone]

## 2024-11-04 NOTE — ANESTHESIA PREPROCEDURE EVALUATION
Anesthesia Evaluation     Patient summary reviewed and Nursing notes reviewed   no history of anesthetic complications:   NPO Solid Status: > 8 hours  NPO Liquid Status: > 8 hours           Airway   Mallampati: II  TM distance: >3 FB  Neck ROM: full  No difficulty expected  Dental - normal exam     Pulmonary - negative pulmonary ROS and normal exam   Cardiovascular - negative cardio ROS and normal exam    ECG reviewed        Neuro/Psych  (+) psychiatric history Anxiety and Depression  GI/Hepatic/Renal/Endo    (+) GERD well controlled    Musculoskeletal     Abdominal  - normal exam    Bowel sounds: normal.   Substance History - negative use     OB/GYN negative ob/gyn ROS         Other   arthritis,   history of cancer remission                  Anesthesia Plan    ASA 2     general   total IV anesthesia  intravenous induction     Anesthetic plan, risks, benefits, and alternatives have been provided, discussed and informed consent has been obtained with: patient.    Plan discussed with CRNA and CAA.      CODE STATUS:

## 2024-11-05 DIAGNOSIS — R93.5 ABNORMAL CT OF THE ABDOMEN: Primary | ICD-10-CM

## 2024-11-05 LAB
LAB AP CASE REPORT: NORMAL
PATH REPORT.FINAL DX SPEC: NORMAL
PATH REPORT.GROSS SPEC: NORMAL

## 2024-11-07 ENCOUNTER — LAB (OUTPATIENT)
Dept: FAMILY MEDICINE CLINIC | Facility: CLINIC | Age: 74
End: 2024-11-07
Payer: MEDICARE

## 2024-11-07 ENCOUNTER — OFFICE VISIT (OUTPATIENT)
Dept: FAMILY MEDICINE CLINIC | Facility: CLINIC | Age: 74
End: 2024-11-07
Payer: MEDICARE

## 2024-11-07 VITALS
DIASTOLIC BLOOD PRESSURE: 80 MMHG | WEIGHT: 175 LBS | OXYGEN SATURATION: 98 % | HEART RATE: 65 BPM | SYSTOLIC BLOOD PRESSURE: 122 MMHG | BODY MASS INDEX: 31.01 KG/M2

## 2024-11-07 DIAGNOSIS — R10.10 PAIN OF UPPER ABDOMEN: ICD-10-CM

## 2024-11-07 DIAGNOSIS — E87.6 HYPOKALEMIA: ICD-10-CM

## 2024-11-07 DIAGNOSIS — R93.5 ABNORMAL CT OF THE ABDOMEN: ICD-10-CM

## 2024-11-07 DIAGNOSIS — Z98.890 HISTORY OF LUMBAR SURGERY: ICD-10-CM

## 2024-11-07 DIAGNOSIS — J01.10 ACUTE NON-RECURRENT FRONTAL SINUSITIS: ICD-10-CM

## 2024-11-07 DIAGNOSIS — M54.50 PAIN OF LUMBAR SPINE: Primary | ICD-10-CM

## 2024-11-07 LAB
ANION GAP SERPL CALCULATED.3IONS-SCNC: 10.9 MMOL/L (ref 5–15)
BUN SERPL-MCNC: 6 MG/DL (ref 8–23)
BUN/CREAT SERPL: 7.1 (ref 7–25)
CALCIUM SPEC-SCNC: 8.9 MG/DL (ref 8.6–10.5)
CHLORIDE SERPL-SCNC: 112 MMOL/L (ref 98–107)
CO2 SERPL-SCNC: 18.1 MMOL/L (ref 22–29)
CREAT SERPL-MCNC: 0.84 MG/DL (ref 0.57–1)
EGFRCR SERPLBLD CKD-EPI 2021: 73.9 ML/MIN/1.73
GLUCOSE SERPL-MCNC: 89 MG/DL (ref 65–99)
POTASSIUM SERPL-SCNC: 4.1 MMOL/L (ref 3.5–5.2)
SODIUM SERPL-SCNC: 141 MMOL/L (ref 136–145)

## 2024-11-07 PROCEDURE — 1160F RVW MEDS BY RX/DR IN RCRD: CPT | Performed by: NURSE PRACTITIONER

## 2024-11-07 PROCEDURE — 99214 OFFICE O/P EST MOD 30 MIN: CPT | Performed by: NURSE PRACTITIONER

## 2024-11-07 PROCEDURE — 80048 BASIC METABOLIC PNL TOTAL CA: CPT | Performed by: NURSE PRACTITIONER

## 2024-11-07 PROCEDURE — 1159F MED LIST DOCD IN RCRD: CPT | Performed by: NURSE PRACTITIONER

## 2024-11-07 PROCEDURE — 36415 COLL VENOUS BLD VENIPUNCTURE: CPT | Performed by: NURSE PRACTITIONER

## 2024-11-07 PROCEDURE — 1125F AMNT PAIN NOTED PAIN PRSNT: CPT | Performed by: NURSE PRACTITIONER

## 2024-11-07 RX ORDER — DOXYCYCLINE 100 MG/1
100 CAPSULE ORAL 2 TIMES DAILY
Qty: 14 CAPSULE | Refills: 0 | Status: SHIPPED | OUTPATIENT
Start: 2024-11-07

## 2024-11-07 RX ORDER — HYDROCODONE BITARTRATE AND ACETAMINOPHEN 5; 325 MG/1; MG/1
1 TABLET ORAL EVERY 8 HOURS PRN
Qty: 60 TABLET | Refills: 0 | Status: SHIPPED | OUTPATIENT
Start: 2024-11-07

## 2024-11-07 NOTE — PROGRESS NOTES
Chief Complaint  Follow-up (4 week follow up abdominal pain/back pain ), Earache (Bilateral ear itching/pressure), and Sinus Problem (Facial pressure with mucus production )    Subjective        Krystyna Fabian presents to [facility]  History of Present Illness    Patient presents for follow-up visit.  She was seen on 9/24 complaints of upper abdominal tightness, like a belt and increasing GERD.  Patient was started on Voquezna and a KUB was ordered which showed increased gas pattern without constipation or other abnormalities.  She denied difficulty swallowing but feels like food is getting stuck in the epigastric region - causes bloating and tightness. No change in discomfort since starting Voquezna. Patient has tried and failed Prilosec but having to take BID.  ET scan of abdomen was completed and showed a soft tissue mass along the right rectal wall/anus with probable extension to the perineum, incomplete distention of the distal sigmoid colon and rectum with questionable wall thickening, moderate stool burden.  Patient was referred to GI for further evaluation - will see them on 11/20.  She underwent a colonoscopy on 11/4, biopsies completed. An MRI has also been ordered.     Patient also having right sided low back pain that is constant, sharp, burning and radiates into the right groin.  She has a history of lumbar surgery, denies recent injury.  Pain reportedly started around the same time as urinary infections.  Patient was treated with tapering prednisone and an x-ray of her L-spine was ordered.  L-spine x-ray showed postsurgical changes, no hardware failure or loosening, there is retrolisthesis of L2 on L3 about 6 mm, this is where the L2 has moved forward onto the L3, there is also moderate degenerative disc disease.  Patient was advised to follow-up with Dr. BRANDON zaragoza see until December.  She reports significant pain, asking for something to help until seen by  "neurosurgery.    Patient also complaining of bilateral ear pain, itching as well as sinus pain and pressure with productive cough.    Objective   Vital Signs:  /80 (BP Location: Left arm, Patient Position: Sitting, Cuff Size: Adult)   Pulse 65   Wt 79.4 kg (175 lb)   SpO2 98%   BMI 31.01 kg/m²   Estimated body mass index is 31.01 kg/m² as calculated from the following:    Height as of 11/4/24: 160 cm (62.99\").    Weight as of this encounter: 79.4 kg (175 lb).            Physical Exam  Constitutional:       Appearance: Normal appearance.   HENT:      Head: Normocephalic.      Right Ear: Tympanic membrane normal.      Left Ear: Tympanic membrane normal.      Mouth/Throat:      Pharynx: Posterior oropharyngeal erythema present.   Cardiovascular:      Rate and Rhythm: Normal rate and regular rhythm.   Pulmonary:      Effort: Pulmonary effort is normal.      Breath sounds: Normal breath sounds.   Abdominal:      General: Abdomen is flat. Bowel sounds are normal.      Palpations: Abdomen is soft.   Musculoskeletal:         General: Normal range of motion.      Cervical back: Neck supple.      Right lower leg: No edema.      Left lower leg: No edema.   Skin:     General: Skin is warm and dry.   Neurological:      Mental Status: She is alert and oriented to person, place, and time.      Gait: Gait is intact.   Psychiatric:         Attention and Perception: Attention normal.         Mood and Affect: Mood normal.         Speech: Speech normal.        Result Review :    CMP          10/2/2024    08:50 11/7/2024    11:23   CMP   Glucose 78  89    BUN 13  6    Creatinine 0.79  0.84    EGFR 79.6  73.9    Sodium 146  141    Potassium 2.8  4.1    Chloride 108  112    Calcium 8.6  8.9    Total Protein 6.4     Albumin 3.9     Globulin 2.5     Total Bilirubin 0.8     Alkaline Phosphatase 78     AST (SGOT) 15     ALT (SGPT) 14     Albumin/Globulin Ratio 1.6     BUN/Creatinine Ratio 16.5  7.1    Anion Gap 12.5  10.9      CBC  "         10/2/2024    08:50 11/4/2024    09:28   CBC   WBC 15.66     RBC 4.52     Hemoglobin 13.2  14.6    Hematocrit 42.1  43    MCV 93.1     MCH 29.2     MCHC 31.4     RDW 13.1     Platelets 284       Data reviewed : Consultant notes Colorectal surgery           Assessment and Plan   Diagnoses and all orders for this visit:    1. Pain of lumbar spine (Primary)  Assessment & Plan:  Will provide Norco PRN severe pain  Keep follow up with Neurosurgery    Orders:  -     HYDROcodone-acetaminophen (Norco) 5-325 MG per tablet; Take 1 tablet by mouth Every 8 (Eight) Hours As Needed for Severe Pain.  Dispense: 60 tablet; Refill: 0    2. History of lumbar surgery  -     HYDROcodone-acetaminophen (Norco) 5-325 MG per tablet; Take 1 tablet by mouth Every 8 (Eight) Hours As Needed for Severe Pain.  Dispense: 60 tablet; Refill: 0    3. Hypokalemia  -     Basic Metabolic Panel    4. Acute non-recurrent frontal sinusitis  -     doxycycline (VIBRAMYCIN) 100 MG capsule; Take 1 capsule by mouth 2 (Two) Times a Day.  Dispense: 14 capsule; Refill: 0    5. Abnormal CT of the abdomen  Assessment & Plan:  Reviewed Colonoscopy  MRI scheduled      6. Pain of upper abdomen  Assessment & Plan:  Patient will see GI on 11/20             I spent 30 minutes caring for Krystyna on this date of service. This time includes time spent by me in the following activities:preparing for the visit, reviewing tests, obtaining and/or reviewing a separately obtained history, performing a medically appropriate examination and/or evaluation , counseling and educating the patient/family/caregiver, ordering medications, tests, or procedures, documenting information in the medical record, and care coordination  Follow Up   Return in about 3 months (around 2/7/2025) for Medicare Wellness.  Patient was given instructions and counseling regarding her condition or for health maintenance advice. Please see specific information pulled into the AVS if appropriate.

## 2024-11-08 ENCOUNTER — PATIENT ROUNDING (BHMG ONLY) (OUTPATIENT)
Age: 74
End: 2024-11-08
Payer: MEDICARE

## 2024-11-08 NOTE — PROGRESS NOTES
November 8, 2024    Hello, may I speak with Krystyna Fabian?    My name is Amanda      I am  with [facility]  [address] [place] [address] [phone].    Before we get started may I verify your date of birth? 1950    I am calling to officially welcome you to our practice and ask about your recent visit. Is this a good time to talk? yes    Tell me about your visit with us. What things went well?  Dr. Rose was very thorough and explained things really well.  He has a great bedside manner.  Everyone else in the office was very friendly and helpful.       We're always looking for ways to make our patients' experiences even better. Do you have recommendations on ways we may improve?  no    Overall were you satisfied with your first visit to our practice? yes       I appreciate you taking the time to speak with me today. Is there anything else I can do for you? no      Thank you, and have a great day.

## 2024-11-12 ENCOUNTER — TELEPHONE (OUTPATIENT)
Dept: FAMILY MEDICINE CLINIC | Facility: CLINIC | Age: 74
End: 2024-11-12
Payer: MEDICARE

## 2024-11-12 DIAGNOSIS — Z98.890 HISTORY OF LUMBAR SURGERY: ICD-10-CM

## 2024-11-12 DIAGNOSIS — M54.50 PAIN OF LUMBAR SPINE: ICD-10-CM

## 2024-11-12 RX ORDER — HYDROCODONE BITARTRATE AND ACETAMINOPHEN 5; 325 MG/1; MG/1
1 TABLET ORAL EVERY 8 HOURS PRN
Qty: 60 TABLET | Refills: 0 | Status: SHIPPED | OUTPATIENT
Start: 2024-11-12

## 2024-11-12 NOTE — TELEPHONE ENCOUNTER
Caller: Krystyna Fabian    Relationship: Self    Best call back number: [phone]     Requested Prescriptions: HYDROcodone-acetaminophen (Norco) 5-325 MG per tablet   Requested Prescriptions      No prescriptions requested or ordered in this encounter        Pharmacy where request should be sent: [id number] [address] [facility] AT [facility] - [phone]  - [phone] [facility] [phone]      Last office visit with prescribing clinician: 11/7/2024   Last telemedicine visit with prescribing clinician: Visit date not found   Next office visit with prescribing clinician: 3/12/2025     Additional details provided by patient: PATIENT IS CALLING TO STATE EVEN THOUGH HER PRESCRIPTION WAS FOR #30, THE PHARMACY WOULD ONLY FILL 7 DAY SUPPLY DUE TO MEDICARE REGULATION.  SHE STATED THAT THE PHARMACY TOLD HER SHE WILL NEED TO GET A  NEW PRESCRIPTION FOR THE REMAINDER OF THE MEDICATION AFTER THE 7 DAYS. SHE STATES SHE WILL BE OUT AFTER THE 14TH.    Does the patient have less than a 3 day supply:  [x] Yes  [] No    Would you like a call back once the refill request has been completed: [] Yes [] No    If the office needs to give you a call back, can they leave a voicemail: [] Yes [] No    Adrian Richard Rep   11/12/24 08:03 EST     PLEASE ADVISE.

## 2024-11-15 ENCOUNTER — HOSPITAL ENCOUNTER (OUTPATIENT)
Dept: MRI IMAGING | Facility: HOSPITAL | Age: 74
Discharge: HOME | End: 2024-11-15
Payer: MEDICARE

## 2024-11-15 DIAGNOSIS — R93.5 ABNORMAL CT OF THE ABDOMEN: ICD-10-CM

## 2024-11-15 PROCEDURE — A9579 GAD-BASE MR CONTRAST NOS,1ML: HCPCS | Performed by: STUDENT IN AN ORGANIZED HEALTH CARE EDUCATION/TRAINING PROGRAM

## 2024-11-15 PROCEDURE — 25010000002 GADOTERIDOL PER 1 ML: Performed by: STUDENT IN AN ORGANIZED HEALTH CARE EDUCATION/TRAINING PROGRAM

## 2024-11-15 PROCEDURE — 72197 MRI PELVIS W/O & W/DYE: CPT

## 2024-11-15 RX ADMIN — GADOTERIDOL 15 ML: 279.3 INJECTION, SOLUTION INTRAVENOUS at 15:14

## 2024-11-20 ENCOUNTER — OFFICE (OUTPATIENT)
Dept: URBAN - METROPOLITAN AREA CLINIC 64 | Facility: CLINIC | Age: 74
End: 2024-11-20
Payer: COMMERCIAL

## 2024-11-20 ENCOUNTER — OFFICE (OUTPATIENT)
Age: 74
End: 2024-11-20
Payer: COMMERCIAL

## 2024-11-20 ENCOUNTER — OFFICE VISIT (OUTPATIENT)
Age: 74
End: 2024-11-20
Payer: MEDICARE

## 2024-11-20 VITALS
HEIGHT: 63 IN | DIASTOLIC BLOOD PRESSURE: 91 MMHG | DIASTOLIC BLOOD PRESSURE: 91 MMHG | HEART RATE: 63 BPM | WEIGHT: 177 LBS | WEIGHT: 177 LBS | WEIGHT: 177 LBS | SYSTOLIC BLOOD PRESSURE: 134 MMHG | HEART RATE: 63 BPM | HEART RATE: 63 BPM | HEIGHT: 63 IN | WEIGHT: 177 LBS | HEIGHT: 63 IN | SYSTOLIC BLOOD PRESSURE: 134 MMHG | WEIGHT: 177 LBS | SYSTOLIC BLOOD PRESSURE: 134 MMHG | SYSTOLIC BLOOD PRESSURE: 134 MMHG | HEIGHT: 63 IN | SYSTOLIC BLOOD PRESSURE: 134 MMHG | WEIGHT: 177 LBS | HEIGHT: 63 IN | HEART RATE: 63 BPM | SYSTOLIC BLOOD PRESSURE: 134 MMHG | DIASTOLIC BLOOD PRESSURE: 91 MMHG | HEIGHT: 63 IN | HEIGHT: 63 IN | WEIGHT: 177 LBS | DIASTOLIC BLOOD PRESSURE: 91 MMHG | HEART RATE: 63 BPM | SYSTOLIC BLOOD PRESSURE: 134 MMHG | HEART RATE: 63 BPM | DIASTOLIC BLOOD PRESSURE: 91 MMHG | DIASTOLIC BLOOD PRESSURE: 91 MMHG | DIASTOLIC BLOOD PRESSURE: 91 MMHG | HEART RATE: 63 BPM

## 2024-11-20 VITALS
RESPIRATION RATE: 18 BRPM | WEIGHT: 177.7 LBS | HEART RATE: 87 BPM | HEIGHT: 63 IN | BODY MASS INDEX: 31.48 KG/M2 | OXYGEN SATURATION: 98 % | TEMPERATURE: 98.7 F | SYSTOLIC BLOOD PRESSURE: 118 MMHG | DIASTOLIC BLOOD PRESSURE: 67 MMHG

## 2024-11-20 DIAGNOSIS — K21.9 GASTRO-ESOPHAGEAL REFLUX DISEASE WITHOUT ESOPHAGITIS: ICD-10-CM

## 2024-11-20 DIAGNOSIS — Z79.1 LONG TERM (CURRENT) USE OF NON-STEROIDAL ANTI-INFLAMMATORIES: ICD-10-CM

## 2024-11-20 DIAGNOSIS — R13.10 DYSPHAGIA, UNSPECIFIED: ICD-10-CM

## 2024-11-20 DIAGNOSIS — K59.02 OUTLET DYSFUNCTION CONSTIPATION: ICD-10-CM

## 2024-11-20 DIAGNOSIS — K62.3 INTERNAL COMPLETE RECTAL PROLAPSE WITH INTUSSUSCEPTION OF RECTOSIGMOID: Primary | ICD-10-CM

## 2024-11-20 DIAGNOSIS — K56.1 INTERNAL COMPLETE RECTAL PROLAPSE WITH INTUSSUSCEPTION OF RECTOSIGMOID: Primary | ICD-10-CM

## 2024-11-20 PROCEDURE — 99203 OFFICE O/P NEW LOW 30 MIN: CPT

## 2024-11-20 RX ORDER — TRIAMCINOLONE ACETONIDE 1 MG/G
CREAM TOPICAL
COMMUNITY
Start: 2024-11-12

## 2024-11-22 NOTE — PROGRESS NOTES
Colorectal Surgery Followup Note    ID:  Krystyna Fabian;   : 1950  DATE OF VISIT: 2024    Chief Complaint  Follow-up (MRI RESULTS )       Subjective    Ms. Fabian is here to discuss her MRI results.  Again the MRI demonstrates no pelvic mass or rectal mass.  Patient continues to describe pelvic outlet dysfunction with dyssynergic defecation.  Exam  General:  No acute distress  Head: Normocephalic, atraumatic  Neuro: Alert and oriented      Assessment  -Internal rectal prolapse  -Rectocele  -Pelvic outlet dysfunction with dyssynergic defecation    Plan / Recommendations  -The findings on CT scan of rectal mass is likely secondary to internal rectal intussusception.  There was no mass identified or appreciated on physical exam, colonoscopy, or MRI.  There was internal rectal prolapse on examination and rigid proctoscopy.  Her pelvic floor is consistent with pelvic outlet dysfunction with dyssynergic defecation    -We will obtain a Sitzmarks study to rule out any colonic dysmotility in addition to pelvic floor disorder    -We will get the video defecography to understand the physiology of her pelvic floor and extent of disease    -Will refer patient to pelvic floor therapy and biofeedback    -We will see patient in 6 to 8 weeks after start of her pelvic physical therapy to discuss further evaluation and possible surgical intervention.      Eugenio Rose MD  Colon and Rectal Surgery   [facility]

## 2024-11-26 DIAGNOSIS — F51.01 PRIMARY INSOMNIA: ICD-10-CM

## 2024-11-26 RX ORDER — ZOLPIDEM TARTRATE 10 MG/1
10 TABLET ORAL NIGHTLY PRN
Qty: 30 TABLET | Refills: 0 | Status: SHIPPED | OUTPATIENT
Start: 2024-11-26

## 2024-12-03 DIAGNOSIS — M54.50 PAIN OF LUMBAR SPINE: ICD-10-CM

## 2024-12-03 DIAGNOSIS — Z98.890 HISTORY OF LUMBAR SURGERY: ICD-10-CM

## 2024-12-03 RX ORDER — HYDROCODONE BITARTRATE AND ACETAMINOPHEN 5; 325 MG/1; MG/1
1 TABLET ORAL EVERY 8 HOURS PRN
Qty: 60 TABLET | Refills: 0 | Status: SHIPPED | OUTPATIENT
Start: 2024-12-03

## 2024-12-03 NOTE — TELEPHONE ENCOUNTER
Caller: Krystyna Fabian    Relationship: Self    Best call back number:     Requested Prescriptions:   Requested Prescriptions     Pending Prescriptions Disp Refills    HYDROcodone-acetaminophen (Norco) 5-325 MG per tablet 60 tablet 0     Sig: Take 1 tablet by mouth Every 8 (Eight) Hours As Needed for Severe Pain.        Pharmacy where request should be sent: [facility] #[id number] [address] AT [facility] [phone] [place] [phone]      Last office visit with prescribing clinician: 11/7/2024   Last telemedicine visit with prescribing clinician: Visit date not found   Next office visit with prescribing clinician: 3/12/2025     Additional details provided by patient: PATIENT SAYS SHE WAS TO SEE A DOCTOR ON JANUARY 10, 2024 AND THE OFFICE HAS CALLED TO RESCHEDULE HER VISIT WITH HIM.  SHE WANTS TO KNOW IF DR LOERA  CAN REFILL THIS FOR HER ONE MORE TIME.     Does the patient have less than a 3 day supply:  [x] Yes  [] No        Adrian Shukla Rep   12/03/24 08:14 EST     ”

## 2024-12-04 ENCOUNTER — TELEPHONE (OUTPATIENT)
Dept: FAMILY MEDICINE CLINIC | Facility: CLINIC | Age: 74
End: 2024-12-04
Payer: MEDICARE

## 2024-12-04 NOTE — TELEPHONE ENCOUNTER
"Caller: Krystyna Fabian    Relationship to patient: Self    Best call back number: [phone]    Patient is needing: PT STATES THAT [facility] TOLD HER THAT THEY NEED A \"STRONGER DIAGNOSIS\" IN ORDER TO FILL HYDROcodone-acetaminophen (Norco) 5-325 MG per tablet. PLEASE ADVISE.     "

## 2024-12-04 NOTE — TELEPHONE ENCOUNTER
Spoke with patient and let her know that we faxed updated information over to them this morning. She is going to call and follow up with them. Advised if she still has issues to let us know.    Previously Declined (within the last year)

## 2024-12-23 ENCOUNTER — OFFICE (AMBULATORY)
Dept: URBAN - METROPOLITAN AREA PATHOLOGY 19 | Facility: PATHOLOGY | Age: 74
End: 2024-12-23
Payer: MEDICARE

## 2024-12-23 ENCOUNTER — ON CAMPUS - OUTPATIENT (AMBULATORY)
Dept: URBAN - METROPOLITAN AREA HOSPITAL 2 | Facility: HOSPITAL | Age: 74
End: 2024-12-23
Payer: MEDICARE

## 2024-12-23 ENCOUNTER — OFFICE (AMBULATORY)
Dept: URBAN - METROPOLITAN AREA PATHOLOGY 19 | Facility: PATHOLOGY | Age: 74
End: 2024-12-23
Payer: COMMERCIAL

## 2024-12-23 VITALS
HEART RATE: 70 BPM | OXYGEN SATURATION: 95 % | DIASTOLIC BLOOD PRESSURE: 79 MMHG | SYSTOLIC BLOOD PRESSURE: 111 MMHG | RESPIRATION RATE: 16 BRPM | SYSTOLIC BLOOD PRESSURE: 125 MMHG | DIASTOLIC BLOOD PRESSURE: 61 MMHG | SYSTOLIC BLOOD PRESSURE: 90 MMHG | RESPIRATION RATE: 15 BRPM | SYSTOLIC BLOOD PRESSURE: 95 MMHG | OXYGEN SATURATION: 100 % | DIASTOLIC BLOOD PRESSURE: 60 MMHG | OXYGEN SATURATION: 99 % | WEIGHT: 175 LBS | DIASTOLIC BLOOD PRESSURE: 62 MMHG | OXYGEN SATURATION: 97 % | HEART RATE: 68 BPM | HEART RATE: 67 BPM | RESPIRATION RATE: 14 BRPM | HEIGHT: 63 IN | SYSTOLIC BLOOD PRESSURE: 103 MMHG | RESPIRATION RATE: 20 BRPM | TEMPERATURE: 97.9 F | HEART RATE: 69 BPM | DIASTOLIC BLOOD PRESSURE: 80 MMHG | RESPIRATION RATE: 17 BRPM | DIASTOLIC BLOOD PRESSURE: 64 MMHG | SYSTOLIC BLOOD PRESSURE: 124 MMHG | HEART RATE: 75 BPM | HEART RATE: 72 BPM

## 2024-12-23 DIAGNOSIS — R10.816 EPIGASTRIC ABDOMINAL TENDERNESS: ICD-10-CM

## 2024-12-23 DIAGNOSIS — K31.89 OTHER DISEASES OF STOMACH AND DUODENUM: ICD-10-CM

## 2024-12-23 DIAGNOSIS — K29.70 GASTRITIS, UNSPECIFIED, WITHOUT BLEEDING: ICD-10-CM

## 2024-12-23 DIAGNOSIS — R13.10 DYSPHAGIA, UNSPECIFIED: ICD-10-CM

## 2024-12-23 LAB
GI HISTOLOGY: A. STOMACH ANTRUM: (no result)
GI HISTOLOGY: PDF REPORT: (no result)

## 2024-12-23 PROCEDURE — 88305 TISSUE EXAM BY PATHOLOGIST: CPT | Mod: 26,Q6 | Performed by: PATHOLOGY

## 2024-12-23 PROCEDURE — 43450 DILATE ESOPHAGUS 1/MULT PASS: CPT | Performed by: INTERNAL MEDICINE

## 2024-12-23 PROCEDURE — 43239 EGD BIOPSY SINGLE/MULTIPLE: CPT | Performed by: INTERNAL MEDICINE

## 2024-12-26 ENCOUNTER — TELEPHONE (OUTPATIENT)
Dept: FAMILY MEDICINE CLINIC | Facility: CLINIC | Age: 74
End: 2024-12-26
Payer: MEDICARE

## 2024-12-26 DIAGNOSIS — F51.01 PRIMARY INSOMNIA: ICD-10-CM

## 2024-12-26 RX ORDER — ZOLPIDEM TARTRATE 10 MG/1
10 TABLET ORAL NIGHTLY PRN
Qty: 30 TABLET | Refills: 0 | Status: SHIPPED | OUTPATIENT
Start: 2024-12-26

## 2025-01-23 ENCOUNTER — OFFICE (AMBULATORY)
Dept: URBAN - METROPOLITAN AREA CLINIC 64 | Facility: CLINIC | Age: 75
End: 2025-01-23
Payer: MEDICARE

## 2025-01-23 VITALS
HEART RATE: 66 BPM | DIASTOLIC BLOOD PRESSURE: 90 MMHG | HEIGHT: 63 IN | WEIGHT: 171 LBS | SYSTOLIC BLOOD PRESSURE: 133 MMHG

## 2025-01-23 DIAGNOSIS — K29.70 GASTRITIS, UNSPECIFIED, WITHOUT BLEEDING: ICD-10-CM

## 2025-01-23 DIAGNOSIS — R13.10 DYSPHAGIA, UNSPECIFIED: ICD-10-CM

## 2025-01-23 DIAGNOSIS — F51.01 PRIMARY INSOMNIA: ICD-10-CM

## 2025-01-23 PROCEDURE — 99213 OFFICE O/P EST LOW 20 MIN: CPT

## 2025-01-23 RX ORDER — PANTOPRAZOLE SODIUM 40 MG/1
80 TABLET, DELAYED RELEASE ORAL
Qty: 60 | Refills: 11 | Status: ACTIVE
Start: 2025-01-23

## 2025-01-24 RX ORDER — ZOLPIDEM TARTRATE 10 MG/1
10 TABLET ORAL NIGHTLY PRN
Qty: 30 TABLET | Refills: 2 | Status: SHIPPED | OUTPATIENT
Start: 2025-01-24

## 2025-02-17 ENCOUNTER — TELEPHONE (OUTPATIENT)
Age: 75
End: 2025-02-17

## 2025-02-17 NOTE — TELEPHONE ENCOUNTER
Hub staff attempted to follow warm transfer process and was unsuccessful     Caller: Krystyna Fabian     Relationship to patient:     Best call back number: [phone]     Patient is needing: PATIENT IS NEEDING ORDERS FOR AN XRAY AFTER SITS MAKER  PILL   PATIENT IS WANTING TO KNOW HOW LONG WILL ORDERS TAKE BECAUSE SHE IS SUPPOSE TO HAVE XRAY TODAY

## 2025-03-09 ENCOUNTER — READMISSION MANAGEMENT (OUTPATIENT)
Dept: CALL CENTER | Facility: HOSPITAL | Age: 75
End: 2025-03-09
Payer: MEDICARE

## 2025-03-09 NOTE — OUTREACH NOTE
Prep Survey      Flowsheet Row Responses   [facility] patient discharged from? Non-BH   Is LACE score < 7 ? Non-BH Discharge   Eligibility [facility]   Date of Admission 03/04/25   Date of Discharge 03/09/25   Discharge Disposition Home-Health Care [facility] [place]   Discharge diagnosis Spinal stenosis of lumbar region, OBLIQUE LUMBAR INTERBODY FUSION L2-3 WITH INSTRUMENTATION, POSTERIOR SPINE DECOMPRESSION AND FUSION L2-3   Does the patient have one of the following disease processes/diagnoses(primary or secondary)? General Surgery   Does the patient have Home health ordered? Yes   Prep survey completed? Yes            Berenice JUDD - Registered Nurse

## 2025-03-10 ENCOUNTER — TRANSITIONAL CARE MANAGEMENT TELEPHONE ENCOUNTER (OUTPATIENT)
Dept: CALL CENTER | Facility: HOSPITAL | Age: 75
End: 2025-03-10
Payer: MEDICARE

## 2025-03-10 NOTE — OUTREACH NOTE
Call Center TCM Note      Flowsheet Row Responses   [facility] patient discharged from? Non-  [[place]]   Does the patient have one of the following disease processes/diagnoses(primary or secondary)? General Surgery   TCM attempt successful? No   Unsuccessful attempts Attempt 2            Jen Shaikh RN    3/10/2025, 15:57 EDT

## 2025-03-10 NOTE — OUTREACH NOTE
Call Center TCM Note      Flowsheet Row Responses   [facility] patient discharged from? Non-  [[place]]   Does the patient have one of the following disease processes/diagnoses(primary or secondary)? General Surgery   TCM attempt successful? No   Unsuccessful attempts Attempt 1            Jen Shaikh RN    3/10/2025, 11:03 EDT

## 2025-03-11 ENCOUNTER — TRANSITIONAL CARE MANAGEMENT TELEPHONE ENCOUNTER (OUTPATIENT)
Dept: CALL CENTER | Facility: HOSPITAL | Age: 75
End: 2025-03-11
Payer: MEDICARE

## 2025-03-11 NOTE — OUTREACH NOTE
Call Center TCM Note      Flowsheet Row Responses   [facility] facility patient discharged from? Non-  [[facility]]   Does the patient have one of the following disease processes/diagnoses(primary or secondary)? General Surgery   TCM attempt successful? No   Unsuccessful attempts Attempt 3   Wrap up additional comments D/C DX: OLIF Lumbar - Unable to reach pt x 3 attempts for TCM call. Pt is not scheduled for TCM APPT with PCP RADHA Lugo, but does have [facility] visit scheduled for tomorrow 03/12/2025.Discharged from [place] 03/09/2025            Meenakshi Christianson MA    3/11/2025, 11:13 EDT

## 2025-03-12 ENCOUNTER — OFFICE VISIT (OUTPATIENT)
Dept: FAMILY MEDICINE CLINIC | Facility: CLINIC | Age: 75
End: 2025-03-12
Payer: MEDICARE

## 2025-03-12 VITALS
OXYGEN SATURATION: 98 % | DIASTOLIC BLOOD PRESSURE: 80 MMHG | HEIGHT: 63 IN | HEART RATE: 84 BPM | BODY MASS INDEX: 31.18 KG/M2 | WEIGHT: 176 LBS | SYSTOLIC BLOOD PRESSURE: 124 MMHG

## 2025-03-12 DIAGNOSIS — Z78.0 POSTMENOPAUSE: ICD-10-CM

## 2025-03-12 DIAGNOSIS — M48.061 SPINAL STENOSIS OF LUMBAR REGION, UNSPECIFIED WHETHER NEUROGENIC CLAUDICATION PRESENT: ICD-10-CM

## 2025-03-12 DIAGNOSIS — F41.9 ANXIETY AND DEPRESSION: ICD-10-CM

## 2025-03-12 DIAGNOSIS — F32.A ANXIETY AND DEPRESSION: ICD-10-CM

## 2025-03-12 DIAGNOSIS — F51.01 PRIMARY INSOMNIA: Primary | ICD-10-CM

## 2025-03-12 DIAGNOSIS — Z00.00 ENCOUNTER FOR SUBSEQUENT ANNUAL WELLNESS VISIT IN MEDICARE PATIENT: ICD-10-CM

## 2025-03-12 RX ORDER — TRIAMCINOLONE ACETONIDE 1 MG/G
1 CREAM TOPICAL 2 TIMES DAILY
Qty: 60 G | Refills: 0 | Status: SHIPPED | OUTPATIENT
Start: 2025-03-12

## 2025-03-12 RX ORDER — QUETIAPINE FUMARATE 50 MG/1
100 TABLET, FILM COATED ORAL
Qty: 180 TABLET | Refills: 3 | Status: SHIPPED | OUTPATIENT
Start: 2025-03-12 | End: 2025-06-10

## 2025-03-12 RX ORDER — GABAPENTIN 100 MG/1
CAPSULE ORAL
COMMUNITY
Start: 2025-01-20

## 2025-03-12 RX ORDER — PANTOPRAZOLE SODIUM 40 MG/1
1 TABLET, DELAYED RELEASE ORAL 2 TIMES DAILY
COMMUNITY
Start: 2025-01-23

## 2025-03-12 RX ORDER — OXYCODONE AND ACETAMINOPHEN 7.5; 325 MG/1; MG/1
1 TABLET ORAL
COMMUNITY
Start: 2025-03-11 | End: 2025-04-10

## 2025-03-12 NOTE — ASSESSMENT & PLAN NOTE
Labs reviewed  Routine vision and dental screenings advised  DEXA scan ordered  Mammogram recommended, deferred at this time

## 2025-03-12 NOTE — ASSESSMENT & PLAN NOTE
Stable on Seroquel nightly and Ambien as needed  Orders:    QUEtiapine (SEROquel) 50 MG tablet; Take 2 tablets by mouth every night at bedtime for 90 days.

## 2025-03-12 NOTE — PROGRESS NOTES
Subjective   The ABCs of the Annual Wellness Visit  Medicare Wellness Visit      Krystyna Fabian is a 75 y.o. patient who presents for a Medicare Wellness Visit.    The following portions of the patient's history were reviewed and   updated as appropriate: allergies, current medications, past family history, past medical history, past social history, past surgical history, and problem list.    Compared to one year ago, the patient's physical   health is better.  Compared to one year ago, the patient's mental   health is better.    Recent Hospitalizations:  She was admitted within the past 365 days at [facility].     Current Medical Providers:  Patient Care Team:  Evangelina Lugo APRN as PCP - General (Nurse Practitioner)  Eugenio Rose MD as Consulting Physician (Colon and Rectal Surgery)    Outpatient Medications Prior to Visit   Medication Sig Dispense Refill    Ascorbic Acid (VITAMIN C PO) Take 1 capsule by mouth Daily.      docusate sodium 100 MG capsule Take 1 capsule by mouth Every Night.      gabapentin (NEURONTIN) 100 MG capsule Take 1 capsule 3 times a day by oral route as directed for 30 days, for spinal/nerve pain.      Multiple Vitamins-Minerals (ZINC PO) Take  by mouth.      omeprazole (priLOSEC) 40 MG capsule TAKE 1 CAPSULE BY MOUTH TWICE  DAILY 180 capsule 3    oxyCODONE-acetaminophen (PERCOCET) 7.5-325 MG per tablet Take 1 tablet by mouth.      pantoprazole (PROTONIX) 40 MG EC tablet Take 1 tablet by mouth 2 (Two) Times a Day.      Turmeric 500 MG capsule Take  by mouth Daily.      VITAMIN D, CHOLECALCIFEROL, PO Take 1 tablet by mouth Daily.      zolpidem (AMBIEN) 10 MG tablet TAKE 1 TABLET BY MOUTH AT NIGHT AS NEEDED FOR SLEEP 30 tablet 2    QUEtiapine (SEROquel) 50 MG tablet TAKE 2 TABLETS BY MOUTH EVERY  NIGHT AT BEDTIME 180 tablet 3    baclofen (LIORESAL) 10 MG tablet Take 1 tablet by mouth Every Night. (Patient not taking: Reported on 3/12/2025) 90 tablet 2    doxycycline  (VIBRAMYCIN) 100 MG capsule Take 1 capsule by mouth 2 (Two) Times a Day. (Patient not taking: Reported on 3/12/2025) 14 capsule 0    HYDROcodone-acetaminophen (Norco) 5-325 MG per tablet Take 1 tablet by mouth Every 8 (Eight) Hours As Needed for Severe Pain. (Patient not taking: Reported on 3/12/2025) 60 tablet 0    triamcinolone (KENALOG) 0.1 % cream APPLY TO AFFECTED AREAS ON EXTREMITIES ONCE TO TWICE DAILY AS NEEDED (Patient not taking: Reported on 3/12/2025)       Facility-Administered Medications Prior to Visit   Medication Dose Route Frequency Provider Last Rate Last Admin    barium sulfate (SITZMARKS) capsule 1 capsule  1 capsule Oral Once Eugenio Rose MD         Opioid medication/s are on active medication list.  and I have evaluated her active treatment plan and pain score trends (see table).  Vitals:    03/12/25 1406   PainSc: 2    PainLoc: Back     I have reviewed the chart for potential of high risk medication and harmful drug interactions in the elderly.        Aspirin is not on active medication list.  Aspirin use is not indicated based on review of current medical condition/s. Risk of harm outweighs potential benefits.  .    Patient Active Problem List   Diagnosis    Bilateral hip bursitis    Depression    Degeneration of intervertebral disc of lumbar region    Fibromyalgia    Gastroesophageal reflux disease    Insomnia    Sciatica    Primary snoring    Irritable bowel syndrome    Acute cystitis without hematuria    Injury of left acromioclavicular joint    Laryngopharyngeal reflux    Acute non-recurrent maxillary sinusitis    Edema    Non-seasonal allergic rhinitis    Frequent nosebleeds    Plantar fasciitis    Preventative health care    Vitamin D deficiency    Frequency of urination    Hypokalemia    Acute non-recurrent sinusitis    Muscle spasm    Closed nondisplaced fracture of third metatarsal bone of right foot    Pneumonia due to COVID-19 virus    Dyspnea    Elevated d-dimer    Chronic  "fatigue    Onychomycosis    Screening mammogram for breast cancer    Acute non-recurrent frontal sinusitis    Anxiety and depression    Elevated BP without diagnosis of hypertension    Bronchitis    Anxiety    Annual physical exam    History of lumbar surgery    Pain of lumbar spine    Right groin pain    Pain of upper abdomen    Lower abdominal pain    Abnormal CT of the abdomen    Spinal stenosis of lumbar region     Advance Care Planning Advance Directive is not on file.  ACP discussion was held with the patient during this visit. Patient does not have an advance directive, information provided.            Objective   Vitals:    25 1406   BP: 124/80   BP Location: Left arm   Patient Position: Sitting   Cuff Size: Adult   Pulse: 84   SpO2: 98%   Weight: 79.8 kg (176 lb)   Height: 160 cm (63\")   PainSc: 2    PainLoc: Back       Estimated body mass index is 31.18 kg/m² as calculated from the following:    Height as of this encounter: 160 cm (63\").    Weight as of this encounter: 79.8 kg (176 lb).                Does the patient have evidence of cognitive impairment? No    ATTENTION  What is the year: correct  What is the month of the year: correct  What is the day of the week?: correct  What is the date?: correct  MEMORY  Repeat address three times, only score third attempt: [address], [place]: 7  HOW MANY ANIMALS DID THE PATIENT NAME  Verbal Fluency -- Animal Names (0-25): 22+  CLOCK DRAWING  Clock Drawing: All Correct  MEMORY RECALL  Tell me what you remember about that name and address we were repeating at the beginnin  ACE TOTAL SCORE  Total ACE Score - <25/30 strongly suggests cognitive impairment; <21/30 almost certainly shows dementia: 30                                                                                                  Health  Risk Assessment    Smoking Status:  Social History     Tobacco Use   Smoking Status Never    Passive exposure: Never   Smokeless " Tobacco Never     Alcohol Consumption:  Social History     Substance and Sexual Activity   Alcohol Use Never       Fall Risk Screen  STEADI Fall Risk Assessment was completed, and patient is at HIGH risk for falls. Assessment completed on:3/12/2025    Depression Screening   Little interest or pleasure in doing things? Not at all   Feeling down, depressed, or hopeless? Not at all   PHQ-2 Total Score 0      Health Habits and Functional and Cognitive Screening:      3/5/2025     6:16 PM   Functional & Cognitive Status   Do you have difficulty preparing food and eating? No   Do you have difficulty bathing yourself, getting dressed or grooming yourself? No   Do you have difficulty using the toilet? No   Do you have difficulty moving around from place to place? No   Do you have trouble with steps or getting out of a bed or a chair? No   Current Diet Limited Junk Food   Dental Exam Up to date   Eye Exam Up to date   Exercise (times per week) 3 times per week   Current Exercises Include Walking   Do you need help using the phone?  No   Are you deaf or do you have serious difficulty hearing?  No   Do you need help to go to places out of walking distance? No   Do you need help shopping? No   Do you need help preparing meals?  No   Do you need help with housework?  No   Do you need help with laundry? No   Do you need help taking your medications? No   Do you need help managing money? No   Do you ever drive or ride in a car without wearing a seat belt? No   Have you felt unusual stress, anger or loneliness in the last month? No   Who do you live with? Alone   If you need help, do you have trouble finding someone available to you? No   Have you been bothered in the last four weeks by sexual problems? No   Do you have difficulty concentrating, remembering or making decisions? No           Age-appropriate Screening Schedule:  Refer to the list below for future screening recommendations based on patient's age, sex and/or medical  conditions. Orders for these recommended tests are listed in the plan section. The patient has been provided with a written plan.    Health Maintenance List  Health Maintenance   Topic Date Due    TDAP/TD VACCINES (1 - Tdap) Never done    ZOSTER VACCINE (2 of 3) 02/26/2013    DXA SCAN  08/09/2023    COVID-19 Vaccine (1 - 2024-25 season) Never done    MAMMOGRAM  01/19/2025    Pneumococcal Vaccine 50+ (3 of 3 - PCV20 or PCV21) 03/29/2025 (Originally 3/22/2023)    INFLUENZA VACCINE  03/31/2025 (Originally 7/1/2024)    LIPID PANEL  10/02/2025    BMI FOLLOWUP  11/20/2025    ANNUAL WELLNESS VISIT  03/12/2026    COLORECTAL CANCER SCREENING  11/04/2034    HEPATITIS C SCREENING  Completed    PAP SMEAR  Discontinued                                                                                                                                                CMS Preventative Services Quick Reference  Risk Factors Identified During Encounter  Depression/Dysphoria:  Stable without medication  Immunizations Discussed/Encouraged: Tdap, Influenza, Prevnar 20 (Pneumococcal 20-valent conjugate), Shingrix, COVID19, and RSV (Respiratory Syncytial Virus)  Polypharmacy: Medication List reviewed  Dental Screening Recommended  Vision Screening Recommended    The above risks/problems have been discussed with the patient.  Pertinent information has been shared with the patient in the After Visit Summary.  An After Visit Summary and PPPS were made available to the patient.    Follow Up:   Next Medicare Wellness visit to be scheduled in 1 year.         Additional E&M Note during same encounter follows:  Patient has additional, significant, and separately identifiable condition(s)/problem(s) that require work above and beyond the Medicare Wellness Visit     Chief Complaint  Medicare Wellness-subsequent and Follow-up (Had back surgery 3/4/25)    Subjective   HPI  Krystyna is also being seen today for additional medical problem/s.    Patient has  "insomnia, taking Seroquel 50 mg nightly and Ambien 10 mg nightly for insomnia.  She is prescribed Wellbutrin  mg daily for anxiety - weaning herself off. Patient reports sx stable, has no acute complaints.      Patient was admitted on 3/4 for L2-3 OLIF and L2-S1 PSF per Dr. Nelson. Patient had 6 day hospital stay, discharged home with physical therapy. She is ambulating with walker. Patient is taking Percocet PRN, family staying with her x 2 weeks. She is also taking Gabapentin 100 mg TID.   Review of Systems   Constitutional:  Negative for fatigue and fever.   Respiratory:  Negative for cough and shortness of breath.    Gastrointestinal:  Positive for constipation. Negative for abdominal pain, diarrhea and nausea.   Genitourinary:  Negative for dysuria.   Musculoskeletal:  Positive for back pain.   Neurological:  Negative for dizziness.   Psychiatric/Behavioral:  The patient is not nervous/anxious.           Objective   Vital Signs:  /80 (BP Location: Left arm, Patient Position: Sitting, Cuff Size: Adult)   Pulse 84   Ht 160 cm (63\")   Wt 79.8 kg (176 lb)   SpO2 98%   BMI 31.18 kg/m²     Physical Exam  Constitutional:       Appearance: Normal appearance.   HENT:      Head: Normocephalic.   Cardiovascular:      Rate and Rhythm: Normal rate and regular rhythm.   Pulmonary:      Effort: Pulmonary effort is normal.      Breath sounds: Normal breath sounds.   Abdominal:      General: Abdomen is flat. Bowel sounds are normal.      Palpations: Abdomen is soft.   Musculoskeletal:         General: Normal range of motion.      Cervical back: Neck supple.      Right lower leg: No edema.      Left lower leg: No edema.   Skin:     General: Skin is warm and dry.             Comments: Incisions clean, dry and intact   Neurological:      Mental Status: She is alert and oriented to person, place, and time.      Gait: Gait is intact.   Psychiatric:         Attention and Perception: Attention normal.         Mood " and Affect: Mood normal.         Speech: Speech normal.             CMP          10/2/2024    08:50 11/7/2024    11:23   CMP   Glucose 78  89    BUN 13  6    Creatinine 0.79  0.84    EGFR 79.6  73.9    Sodium 146  141    Potassium 2.8  4.1    Chloride 108  112    Calcium 8.6  8.9    Total Protein 6.4     Albumin 3.9     Globulin 2.5     Total Bilirubin 0.8     Alkaline Phosphatase 78     AST (SGOT) 15     ALT (SGPT) 14     Albumin/Globulin Ratio 1.6     BUN/Creatinine Ratio 16.5  7.1    Anion Gap 12.5  10.9      CBC          10/2/2024    08:50 11/4/2024    09:28   CBC   WBC 15.66     RBC 4.52     Hemoglobin 13.2  14.6    Hematocrit 42.1  43    MCV 93.1     MCH 29.2     MCHC 31.4     RDW 13.1     Platelets 284       Lipid Panel          10/2/2024    08:50   Lipid Panel   Total Cholesterol 174    Triglycerides 149    HDL Cholesterol 54    VLDL Cholesterol 26    LDL Cholesterol  94    LDL/HDL Ratio 1.67      TSH          10/2/2024    08:50   TSH   TSH 2.420      Most Recent A1C          10/2/2024    08:50   HGBA1C Most Recent   Hemoglobin A1C 4.90           Assessment and Plan Additional age appropriate preventative wellness advice topics were discussed during today's preventative wellness exam(some topics already addressed during AWV portion of the note above):    Physical Activity: Advised cardiovascular activity 150 minutes per week as tolerated. (example brisk walk for 30 minutes, 5 days a week).     Nutrition: Discussed nutrition plan with patient. Information shared in after visit summary. Goal is for a well balanced diet to enhance overall health.     Primary insomnia  Stable on Seroquel nightly and Ambien as needed  Orders:    QUEtiapine (SEROquel) 50 MG tablet; Take 2 tablets by mouth every night at bedtime for 90 days.    Postmenopause    Orders:    DEXA Bone Density Axial; Future    Anxiety and depression  Stable without medication         Spinal stenosis of lumbar region, unspecified whether neurogenic  claudication present  Reviewed surgery notes  Continue physical therapy  Percocet as needed, discussed slowly tapering  May take Senokot or MiraLAX as needed constipation  Follow-up with surgery as recommended       Encounter for subsequent annual wellness visit in Medicare patient  Labs reviewed  Routine vision and dental screenings advised  DEXA scan ordered  Mammogram recommended, deferred at this time             I spent 30 minutes caring for Krystyna on this date of service. This time includes time spent by me in the following activities:preparing for the visit, reviewing tests, obtaining and/or reviewing a separately obtained history, performing a medically appropriate examination and/or evaluation , counseling and educating the patient/family/caregiver, ordering medications, tests, or procedures, documenting information in the medical record, and care coordination  Follow Up   Return in about 6 months (around 9/12/2025) for Insomnia.  Patient was given instructions and counseling regarding her condition or for health maintenance advice. Please see specific information pulled into the AVS if appropriate.

## 2025-03-12 NOTE — ASSESSMENT & PLAN NOTE
Reviewed surgery notes  Continue physical therapy  Percocet as needed, discussed slowly tapering  May take Senokot or MiraLAX as needed constipation  Follow-up with surgery as recommended

## 2025-03-24 ENCOUNTER — OFFICE VISIT (OUTPATIENT)
Age: 75
End: 2025-03-24
Payer: MEDICARE

## 2025-03-24 VITALS
SYSTOLIC BLOOD PRESSURE: 103 MMHG | HEIGHT: 63 IN | BODY MASS INDEX: 31.17 KG/M2 | TEMPERATURE: 98.2 F | HEART RATE: 68 BPM | RESPIRATION RATE: 20 BRPM | WEIGHT: 175.9 LBS | OXYGEN SATURATION: 98 % | DIASTOLIC BLOOD PRESSURE: 63 MMHG

## 2025-03-24 DIAGNOSIS — K56.1 INTERNAL COMPLETE RECTAL PROLAPSE WITH INTUSSUSCEPTION OF RECTOSIGMOID: Primary | ICD-10-CM

## 2025-03-24 DIAGNOSIS — K59.02 OUTLET DYSFUNCTION CONSTIPATION: ICD-10-CM

## 2025-03-24 DIAGNOSIS — R93.5 ABNORMAL CT OF THE ABDOMEN: ICD-10-CM

## 2025-03-24 DIAGNOSIS — K62.3 INTERNAL COMPLETE RECTAL PROLAPSE WITH INTUSSUSCEPTION OF RECTOSIGMOID: Primary | ICD-10-CM

## 2025-03-24 PROCEDURE — G2211 COMPLEX E/M VISIT ADD ON: HCPCS | Performed by: STUDENT IN AN ORGANIZED HEALTH CARE EDUCATION/TRAINING PROGRAM

## 2025-03-24 PROCEDURE — 1159F MED LIST DOCD IN RCRD: CPT | Performed by: STUDENT IN AN ORGANIZED HEALTH CARE EDUCATION/TRAINING PROGRAM

## 2025-03-24 PROCEDURE — 99214 OFFICE O/P EST MOD 30 MIN: CPT | Performed by: STUDENT IN AN ORGANIZED HEALTH CARE EDUCATION/TRAINING PROGRAM

## 2025-03-24 PROCEDURE — 1160F RVW MEDS BY RX/DR IN RCRD: CPT | Performed by: STUDENT IN AN ORGANIZED HEALTH CARE EDUCATION/TRAINING PROGRAM

## 2025-03-24 RX ORDER — PREGABALIN 100 MG/1
100 CAPSULE ORAL 2 TIMES DAILY
COMMUNITY

## 2025-03-25 NOTE — PROGRESS NOTES
Colorectal Surgery Followup Note    ID:  Krystyna Fabian;   : 1950  DATE OF VISIT: 3/25/2025    Chief Complaint  Follow-up (Follow up Internal complete rectal prolapse with intussusception of rectosigmoid)       Subjective    Ms. Fabian recently underwent a back surgery.  She is currently recovering from that.  She was not able to get her Sitzmarks study.  Her defecography was consistent with pelvic outlet dysfunction.     Exam  General:  No acute distress  Head: Normocephalic, atraumatic  Neuro: Alert and oriented    Abdomen:  Soft, non-tender, non-distended, no hernias, no hepatomegaly, no splenomegaly. No abnormal, audible bowel sounds.        Assessment  -Pelvic outlet dysfunction with constipation  -Dyssynergic defecation  -Internal rectal prolapse  -Rectocele    Plan / Recommendations  -We discussed the video defecography findings which shows no change in the anorectal angle with Valsalva and straining.  This is consistent with pelvic outlet dysfunction.  She does have a small rectocele.   -We will obtain Sitzmarks study to rule out colonic inertia in addition to pelvic outlet dysfunction  -Treatment will be pelvic physical therapy.  Referral has been sent.  I recommended for her to start with pelvic physical therapy when she is cleared from her spine surgeon  -Follow-up after 6 weeks of pelvic physical therapy      Eugenio Rose MD  Colon and Rectal Surgery   [facility]

## 2025-03-26 ENCOUNTER — HOSPITAL ENCOUNTER (OUTPATIENT)
Dept: BONE DENSITY | Facility: HOSPITAL | Age: 75
Discharge: HOME | End: 2025-03-26
Admitting: NURSE PRACTITIONER
Payer: MEDICARE

## 2025-03-26 DIAGNOSIS — Z78.0 POSTMENOPAUSE: ICD-10-CM

## 2025-03-26 PROCEDURE — 77080 DXA BONE DENSITY AXIAL: CPT

## 2025-04-04 ENCOUNTER — OFFICE VISIT (OUTPATIENT)
Dept: ORTHOPEDIC SURGERY | Facility: CLINIC | Age: 75
End: 2025-04-04
Payer: MEDICARE

## 2025-04-04 ENCOUNTER — TELEPHONE (OUTPATIENT)
Dept: ORTHOPEDIC SURGERY | Facility: CLINIC | Age: 75
End: 2025-04-04

## 2025-04-04 ENCOUNTER — PATIENT ROUNDING (BHMG ONLY) (OUTPATIENT)
Dept: ORTHOPEDIC SURGERY | Facility: CLINIC | Age: 75
End: 2025-04-04
Payer: MEDICARE

## 2025-04-04 VITALS — OXYGEN SATURATION: 98 % | BODY MASS INDEX: 31.01 KG/M2 | HEIGHT: 63 IN | WEIGHT: 175 LBS | RESPIRATION RATE: 20 BRPM

## 2025-04-04 DIAGNOSIS — M71.21 BAKER'S CYST OF KNEE, RIGHT: ICD-10-CM

## 2025-04-04 DIAGNOSIS — M25.561 CHRONIC PAIN OF RIGHT KNEE: Primary | ICD-10-CM

## 2025-04-04 DIAGNOSIS — M25.562 CHRONIC PAIN OF LEFT KNEE: ICD-10-CM

## 2025-04-04 DIAGNOSIS — M17.11 PRIMARY OSTEOARTHRITIS OF RIGHT KNEE: ICD-10-CM

## 2025-04-04 DIAGNOSIS — G89.29 CHRONIC PAIN OF LEFT KNEE: ICD-10-CM

## 2025-04-04 DIAGNOSIS — G89.29 CHRONIC PAIN OF RIGHT KNEE: Primary | ICD-10-CM

## 2025-04-04 RX ORDER — TRIAMCINOLONE ACETONIDE 40 MG/ML
80 INJECTION, SUSPENSION INTRA-ARTICULAR; INTRAMUSCULAR
Status: COMPLETED | OUTPATIENT
Start: 2025-04-04 | End: 2025-04-04

## 2025-04-04 RX ORDER — LIDOCAINE HYDROCHLORIDE 10 MG/ML
2 INJECTION, SOLUTION EPIDURAL; INFILTRATION; INTRACAUDAL; PERINEURAL
Status: COMPLETED | OUTPATIENT
Start: 2025-04-04 | End: 2025-04-04

## 2025-04-04 RX ADMIN — LIDOCAINE HYDROCHLORIDE 2 ML: 10 INJECTION, SOLUTION EPIDURAL; INFILTRATION; INTRACAUDAL; PERINEURAL at 12:23

## 2025-04-04 RX ADMIN — TRIAMCINOLONE ACETONIDE 80 MG: 40 INJECTION, SUSPENSION INTRA-ARTICULAR; INTRAMUSCULAR at 12:23

## 2025-04-04 NOTE — TELEPHONE ENCOUNTER
Caller: Krystyna Fabian    Relationship: Self    Best call back number: [phone]    What was the call regarding: PT CALLING TO SEE HOW LONG SHE SHOULD EXPECT FOR THE KNEE INJ WITH MARY CASTRO TODAY TO TAKE EFFECT.     PT ALSO WOULD LIKE TO DISCUSS GETTING A DIFFERENT SIZE BRACE FOR THE KNEE, AS THE ONE SHE RECEIVED TODAY KEEPS FALLING DOWN.

## 2025-04-08 NOTE — TELEPHONE ENCOUNTER
Hub staff attempted to follow warm transfer process and was unsuccessful     Caller: Krystyna Fabian    Relationship to patient: Self    Best call back number: [phone]    Patient is needing: PATIENT WAS UNABLE TO COME IN YESTERDAY TO EXCHANGE HER KNEE BRACE. SHE SPOKE TO THE OFFICE AND SAID SOMEONE WAS TO CALL HER BACK ABOUT COMING IN TODAY OR TOMORROW AND SHE IS FOLLOWING UP ON THAT. PLEASE ADVISE.

## 2025-04-09 ENCOUNTER — RESULTS FOLLOW-UP (OUTPATIENT)
Dept: ORTHOPEDIC SURGERY | Facility: CLINIC | Age: 75
End: 2025-04-09
Payer: MEDICARE

## 2025-04-11 ENCOUNTER — TELEPHONE (OUTPATIENT)
Dept: ORTHOPEDIC SURGERY | Facility: CLINIC | Age: 75
End: 2025-04-11
Payer: MEDICARE

## 2025-04-15 ENCOUNTER — TELEPHONE (OUTPATIENT)
Dept: FAMILY MEDICINE CLINIC | Facility: CLINIC | Age: 75
End: 2025-04-15

## 2025-04-15 NOTE — TELEPHONE ENCOUNTER
Caller: Krystyna Fabian    Relationship: Self    Best call back number: [phone]    What is the best time to reach you: ANYTIME    Who are you requesting to speak with (clinical staff, provider,  specific staff member): CLINICAL    What was the call regarding: PLEASE CALL PATIENT BACK ABOUT A BILLING ERROR SHE RECEIVED. SHE DIDN'T GET ANY HELP FROM THE BILLING DEPARTMENT OR INSURANCE AND WOULD LIKE TO SPEAK WITH THE OFFICE DIRECTLY PLEASE.     Is it okay if the provider responds through MyChart: NO       Event Note (4) no limitation

## 2025-04-18 NOTE — TELEPHONE ENCOUNTER
Spoke with patient on 4/17/25 regarding her concerns regarding her recent bill.  I have reached out to billing department to discuss bill for two different visits.  I will contact patient when issue gets resolved.

## 2025-05-01 DIAGNOSIS — F51.01 PRIMARY INSOMNIA: ICD-10-CM

## 2025-05-01 RX ORDER — ZOLPIDEM TARTRATE 10 MG/1
10 TABLET ORAL NIGHTLY PRN
Qty: 30 TABLET | Refills: 0 | Status: SHIPPED | OUTPATIENT
Start: 2025-05-01

## 2025-05-01 NOTE — TELEPHONE ENCOUNTER
REFERRING PHYSICIAN: Austin Webb MD.     CONSULTING PHYSICIAN: Taras Romero DO     CHIEF COMPLAINT: Shortness of breath    HISTORY OF PRESENT ILLNESS: The patient is a 61 y.o. male with past medical history of end stage renal disease on hemodialysis, peripheral arterial disease, cardiomyopathy with EF 40%, severe aortic stenosis, HIV on antiretroviral medications and recent COVID-19 infection who presented with with shortness of breath and severe chest pain. He missed on session of hemodialysis. Upon arrival patient was found to be hypertensive 200/100 with elevated troponin and nonspecific ST changes. Patient underwent cardiac catheterization which reveled multivessel coronary artery disease and and echocardiogram which revealed severe aortic stenosis.       PAST MEDICAL HISTORY:   Active Ambulatory Problems     Diagnosis Date Noted    No Active Ambulatory Problems     Resolved Ambulatory Problems     Diagnosis Date Noted    No Resolved Ambulatory Problems     No Additional Past Medical History       PAST SURGICAL HISTORY: No past surgical history on file.     ALLERGIES: No Known Allergies     CURRENT MEDICATIONS:   Current Facility-Administered Medications:     famotidine (Pepcid) tablet 20 mg, 20 mg, Oral, Q EVENING, Denis Ramos M.D., 20 mg at 03/07/24 1822    heparin injection 5,000 Units, 5,000 Units, Subcutaneous, Q8HRS, Denis Ramos M.D., 5,000 Units at 03/08/24 0629    atorvastatin (Lipitor) tablet 40 mg, 40 mg, Oral, Q EVENING, RUBA PintoN.P., 40 mg at 03/07/24 1822    nitroglycerin (Nitrostat) tablet 0.4 mg, 0.4 mg, Sublingual, Q5 MIN PRN, RUBA PintoN.P.    HYDROmorphone (Dilaudid) injection 0.5 mg, 0.5 mg, Intravenous, Q30 MIN PRN, RUBA PintoN.P., 0.5 mg at 03/04/24 0137    lidocaine (Xylocaine) 1 % injection 1 mL, 1 mL, Intradermal, PRN, Yuni Dwyer M.D.    Respiratory Therapy Consult, , Nebulization, Continuous RT, Julio Contreras M.D.    acetaminophen  Rx request    (Tylenol) tablet 650 mg, 650 mg, Oral, Q6HRS PRN, Julio Contreras M.D.    senna-docusate (Pericolace Or Senokot S) 8.6-50 MG per tablet 2 Tablet, 2 Tablet, Oral, Q EVENING, 2 Tablet at 03/07/24 1822 **AND** polyethylene glycol/lytes (Miralax) Packet 1 Packet, 1 Packet, Oral, QDAY PRN, Julio Contreras M.D.    Notify provider if pain remains uncontrolled, , , CONTINUOUS **AND** Use the Numeric Rating Scale (NRS), Estrada-Baker Faces (WBF), or FLACC on regular floors and Critical-Care Pain Observation Tool (CPOT) on ICUs/Trauma to assess pain, , , CONTINUOUS **AND** Pulse Ox, , , CONTINUOUS **AND** Pharmacy Consult Request ...Pain Management Review 1 Each, 1 Each, Other, PHARMACY TO DOSE **AND** If patient difficult to arouse and/or has respiratory depression (respiratory rate of 10 or less), stop any opiates that are currently infusing and call a Rapid Response., , , CONTINUOUS, Julio Contreras M.D.    oxyCODONE immediate-release (Roxicodone) tablet 5 mg, 5 mg, Oral, Q3HRS PRN, 5 mg at 03/07/24 1204 **OR** oxyCODONE immediate release (Roxicodone) tablet 10 mg, 10 mg, Oral, Q3HRS PRN, 10 mg at 03/03/24 2316 **OR** HYDROmorphone (Dilaudid) injection 0.5 mg, 0.5 mg, Intravenous, Q3HRS PRN, Julio Contreras M.D., 0.5 mg at 03/03/24 2319    hydrALAZINE (Apresoline) injection 10 mg, 10 mg, Intravenous, Q4HRS PRN, Julio Contreras M.D.    ondansetron (Zofran) syringe/vial injection 4 mg, 4 mg, Intravenous, Q4HRS PRN, Julio Contreras M.D.    ondansetron (Zofran ODT) dispertab 4 mg, 4 mg, Oral, Q4HRS PRN, Julio Contreras M.D.    promethazine (Phenergan) tablet 12.5-25 mg, 12.5-25 mg, Oral, Q4HRS PRN, Julio Contreras M.D.    promethazine (Phenergan) suppository 12.5-25 mg, 12.5-25 mg, Rectal, Q4HRS PRN, Julio Contreras M.D.    prochlorperazine (Compazine) injection 5-10 mg, 5-10 mg, Intravenous, Q4HRS PRN, Julio Contreras M.D., 10 mg at 03/04/24 0137    rilpivirine (Edurant) tablet 25 mg, 25 mg, Oral, QDAY with  Breakfast, Julio Contreras M.D., 25 mg at 03/08/24 0847    dolutegravir (Tivicay) tablet 50 mg, 50 mg, Oral, DAILY, Julio Contreras M.D., 50 mg at 03/08/24 0630    calcitRIOL (Rocaltrol) capsule 0.25 mcg, 0.25 mcg, Oral, DAILY, Julio Contreras M.D., 0.25 mcg at 03/08/24 0630    lisinopril (Prinivil) tablet 20 mg, 20 mg, Oral, Q DAY, Julio Contreras M.D., 20 mg at 03/08/24 0629    aspirin EC tablet 81 mg, 81 mg, Oral, DAILY, Julio Contreras M.D., 81 mg at 03/08/24 0629    nicotine (Nicoderm) 21 MG/24HR 21 mg, 21 mg, Transdermal, Daily-0600, Julio Contreras M.D.    carvedilol (Coreg) tablet 25 mg, 25 mg, Oral, BID WITH MEALS, Julio Contreras M.D., 25 mg at 03/08/24 0803    amLODIPine (Norvasc) tablet 10 mg, 10 mg, Oral, Q DAY, Julio Contreras M.D., 10 mg at 03/08/24 0629    sevelamer carbonate (Renvela) tablet 1,600 mg, 1,600 mg, Oral, TID WITH MEALS, Julio Contreras M.D., 1,600 mg at 03/08/24 0847    FAMILY HISTORY: No family history on file.     SOCIAL HISTORY:   Social History     Socioeconomic History    Marital status: Single     Spouse name: Not on file    Number of children: Not on file    Years of education: Not on file    Highest education level: Not on file   Occupational History    Not on file   Tobacco Use    Smoking status: Not on file    Smokeless tobacco: Not on file   Substance and Sexual Activity    Alcohol use: Not on file    Drug use: Not on file    Sexual activity: Not on file   Other Topics Concern    Not on file   Social History Narrative    Not on file     Social Determinants of Health     Financial Resource Strain: Not on file   Food Insecurity: Not on file   Transportation Needs: Not on file   Physical Activity: Not on file   Stress: Not on file   Social Connections: Not on file   Intimate Partner Violence: Not on file   Housing Stability: Not on file       REVIEW OF SYSTEMS:  Review of Systems   Constitutional:  Negative for malaise/fatigue.   Eyes:  Negative for blurred  "vision and double vision.   Respiratory:  Positive for shortness of breath. Negative for cough.    Cardiovascular:  Positive for chest pain. Negative for palpitations, leg swelling and PND.   Gastrointestinal:  Negative for abdominal pain and nausea.   Musculoskeletal:  Negative for joint pain.   Neurological:  Negative for dizziness and weakness.   Psychiatric/Behavioral:  Negative for substance abuse. The patient is not nervous/anxious.          PHYSICAL EXAMINATION:    /69   Pulse 71   Temp 36.7 °C (98.1 °F) (Temporal)   Resp 17   Ht 1.626 m (5' 4\")   Wt 64.4 kg (141 lb 15.6 oz)   SpO2 93%   BMI 24.37 kg/m²    Physical Exam  Vitals and nursing note reviewed. Exam conducted with a chaperone present.   Constitutional:       General: He is not in acute distress.  HENT:      Head: Normocephalic and atraumatic.      Nose: Nose normal.   Eyes:      Conjunctiva/sclera: Conjunctivae normal.      Pupils: Pupils are equal, round, and reactive to light.   Neck:      Vascular: No JVD.      Trachea: No tracheal deviation.   Cardiovascular:      Rate and Rhythm: Normal rate and regular rhythm.   Pulmonary:      Effort: Pulmonary effort is normal. No respiratory distress.      Breath sounds: Normal breath sounds. No stridor.   Abdominal:      General: Bowel sounds are normal. There is no distension.      Palpations: Abdomen is soft.      Tenderness: There is no abdominal tenderness.   Musculoskeletal:         General: No tenderness. Normal range of motion.      Cervical back: Normal range of motion and neck supple.   Skin:     General: Skin is warm and dry.   Neurological:      Mental Status: He is alert and oriented to person, place, and time.      Coordination: Coordination normal.      Gait: Gait is intact.   Psychiatric:         Mood and Affect: Mood and affect normal.         Cognition and Memory: Memory normal.             LABS REVIEWED:  Lab Results   Component Value Date/Time    SODIUM 135 03/07/2024 03:25 " AM    POTASSIUM 5.0 03/07/2024 03:25 AM    CHLORIDE 93 (L) 03/07/2024 03:25 AM    CO2 23 03/07/2024 03:25 AM    GLUCOSE 124 (H) 03/07/2024 03:25 AM    BUN 63 (H) 03/07/2024 03:25 AM    CREATININE 9.64 (HH) 03/07/2024 03:25 AM      Lab Results   Component Value Date/Time    PROTHROMBTM 14.4 03/04/2024 12:13 AM    INR 1.11 03/04/2024 12:13 AM      Lab Results   Component Value Date/Time    WBC 7.7 03/06/2024 10:19 AM    RBC 2.57 (L) 03/06/2024 10:19 AM    HEMOGLOBIN 8.6 (L) 03/06/2024 10:19 AM    HEMATOCRIT 25.3 (L) 03/06/2024 10:19 AM    MCV 98.4 (H) 03/06/2024 10:19 AM    MCH 33.5 (H) 03/06/2024 10:19 AM    MCHC 34.0 03/06/2024 10:19 AM    MPV 10.2 03/06/2024 10:19 AM    NEUTSPOLYS 69.50 03/06/2024 10:19 AM    LYMPHOCYTES 19.60 (L) 03/06/2024 10:19 AM    MONOCYTES 7.70 03/06/2024 10:19 AM    EOSINOPHILS 2.30 03/06/2024 10:19 AM    BASOPHILS 0.40 03/06/2024 10:19 AM        IMAGING REVIEWED AND INTERPRETED:    [place] [facility]  [place]IONS  No prior study is available for comparison.   Normal LV size and thickness with mildly reduced LV systolic function:   Estimated LVEF 45-50%  Grade II LV diastolic dysfunction  Normal RV size and systolic function  Thickened posterior mitral valve leaflet, either calcifications or   vegetation  Moderate MR  Severe aortic valve stenosis: Vmax 4.5m/s  Moderate AI  Mild TR  Dilated left atrium  Pulmonary hypertension with estimated RVSP 55mmHg    CARDIAC CATHETERIZATION   [place] [facility]  HEMODYNAMICS:  Aortic pressure: 109/62 mmHg  No LHC performed given severe aortic stenosis     CORONARY ANGIOGRAPHY:  The left main coronary artery : Large in caliber vessel with mild CAD, trifurcates to LAD, ramus intermedius and left circumflex  The left anterior descending coronary artery : Large in caliber transapical vessel with calcified 80% severe proximal/mid stenoses.  Gives rise to several small-medium caliber diagonal branches with severe ostial/proximal disease  Ramus intermedius  coronary artery: Small-medium in caliber vessel  The left circumflex coronary artery : Large-caliber vessel with severe 80% ostial/proximal stenosis.  Gives rise to a bifurcating OM and the true circumflex tapers in the AV groove.  The distal circumflex has severe diffuse disease  The right coronary artery  : Large-caliber dominant vessel with severe calcified 80% mid stenosis.  Small PLB system.  Severe 80% proximal/mid right PDA.       IMPRESSION:  Type II non-STEMI, multivessel coronary artery disease, acute on chronic combined systolic and diastolic heart failure severe symptomatic aortic stenosis, end-stage renal disease on dialysis, HIV on retroviral therapy, hypertensive emergency, acute respiratory failure with hypoxia, history of tobacco abuse,      PLAN:    I I discussed with the patient the findings from his imaging and potential therapeutic strategies.  The patient strongly prefers to avoid open heart surgery.  I discussed pros and cons of percutaneous therapy versus surgical therapy with him.  Based off of his multiple comorbid conditions, he is high risk for aortic valve replacement with CABG.  Additionally, given his end-stage renal disease and HIV, long-term benefits of surgical intervention are reduced in his patient population.  Currently, benefits of surgery do not seem to outweigh risk in this patient.    The STS mortality risk score is 8.5% and the morbidity and mortality risk score is 27.5%. The scores were discussed with patient.     Thank you for this very challenging consultation and participation in the patient’s care.  I will keep you apprised of all future developments.      Sincerely,     Taras Romero DO.        I,  Taras Romero DO performed a substantial portion of the service face-to-face with the same patient on the same date of service. I have reviewed and agree with the care plan and complexity of problems documented by me, Taras Romero DO and/or Sharonda Cooper  VIOLA HULL was personally involved in the medical decision making, including the information below:  reviewing and interpreting the films, conducted elements of the history and physical exam, and provided the plan of care. All medical decision making was made by me.

## 2025-05-29 DIAGNOSIS — F51.01 PRIMARY INSOMNIA: ICD-10-CM

## 2025-05-29 RX ORDER — ZOLPIDEM TARTRATE 10 MG/1
10 TABLET ORAL NIGHTLY PRN
Qty: 30 TABLET | Refills: 0 | Status: SHIPPED | OUTPATIENT
Start: 2025-05-29

## 2025-06-27 DIAGNOSIS — F51.01 PRIMARY INSOMNIA: ICD-10-CM

## 2025-06-27 RX ORDER — ZOLPIDEM TARTRATE 10 MG/1
10 TABLET ORAL NIGHTLY PRN
Qty: 30 TABLET | Refills: 0 | Status: SHIPPED | OUTPATIENT
Start: 2025-06-27

## 2025-07-07 ENCOUNTER — TELEPHONE (OUTPATIENT)
Dept: FAMILY MEDICINE CLINIC | Facility: CLINIC | Age: 75
End: 2025-07-07
Payer: MEDICARE

## 2025-07-07 NOTE — TELEPHONE ENCOUNTER
Pt called in with concern of chest congestion, productive cough with green mucus, having sob, nasal drainage, nose bleed last week. Pt states symptoms started over a week ago. Pt has been taking Sudafed with no relief.

## 2025-07-07 NOTE — TELEPHONE ENCOUNTER
No available appointments today or tomorrow - if ongoing x 1 week, I can send antibiotics to pharmacy. If having persistent chest tightness, dyspnea - should consider UC/ER

## 2025-07-08 ENCOUNTER — TELEPHONE (OUTPATIENT)
Dept: FAMILY MEDICINE CLINIC | Facility: CLINIC | Age: 75
End: 2025-07-08
Payer: MEDICARE

## 2025-07-08 RX ORDER — AMOXICILLIN 500 MG/1
500 CAPSULE ORAL 2 TIMES DAILY
Qty: 20 CAPSULE | Refills: 0 | Status: SHIPPED | OUTPATIENT
Start: 2025-07-08 | End: 2025-07-18

## 2025-07-08 NOTE — TELEPHONE ENCOUNTER
Please call in antibiotic into [facility]. Romie /Michaela ADLER,[place]     This is from message on 7/7/25

## 2025-07-08 NOTE — TELEPHONE ENCOUNTER
No, medication was not sent. I did not get a response that patient was agreeable. Amoxicillin sent to pharmacy. If sx persist or worsen, will need to be seen.

## 2025-07-16 ENCOUNTER — TELEPHONE (OUTPATIENT)
Dept: ORTHOPEDIC SURGERY | Facility: CLINIC | Age: 75
End: 2025-07-16

## 2025-07-16 ENCOUNTER — TELEPHONE (OUTPATIENT)
Dept: FAMILY MEDICINE CLINIC | Facility: CLINIC | Age: 75
End: 2025-07-16
Payer: MEDICARE

## 2025-07-16 RX ORDER — FLUCONAZOLE 150 MG/1
150 TABLET ORAL ONCE
Qty: 1 TABLET | Refills: 0 | Status: SHIPPED | OUTPATIENT
Start: 2025-07-16 | End: 2025-07-16

## 2025-07-16 NOTE — TELEPHONE ENCOUNTER
I sent a dose of Diflucan over to her pharmacy but her Seroquel must be held the day she takes medication.  If symptoms persist, let me know

## 2025-07-16 NOTE — TELEPHONE ENCOUNTER
Caller: Krystyna Fabian    Relationship: Self    Best call back number:   Telephone Information:   Mobile [phone]         What medication are you requesting: YEAST INFECTION MED    What are your current symptoms: YEAST INFECTION FROM AMOXICILLIN PRESCRIBED LAST WEEK    How long have you been experiencing symptoms:     Have you had these symptoms before:    [x] Yes  [] No    Have you been treated for these symptoms before:   [x] Yes  [] No    If a prescription is needed, what is your preferred pharmacy and phone number: [id number] - [facility] [address]  AT [facility] [phone] [facility] [phone]      Additional notes:

## 2025-07-18 ENCOUNTER — OFFICE VISIT (OUTPATIENT)
Dept: ORTHOPEDIC SURGERY | Facility: CLINIC | Age: 75
End: 2025-07-18
Payer: MEDICARE

## 2025-07-18 VITALS — BODY MASS INDEX: 31.01 KG/M2 | WEIGHT: 175 LBS | HEIGHT: 63 IN | RESPIRATION RATE: 20 BRPM | OXYGEN SATURATION: 98 %

## 2025-07-18 DIAGNOSIS — M17.11 PRIMARY OSTEOARTHRITIS OF RIGHT KNEE: Primary | ICD-10-CM

## 2025-07-18 RX ORDER — OXYCODONE AND ACETAMINOPHEN 7.5; 325 MG/1; MG/1
1 TABLET ORAL
COMMUNITY
Start: 2025-04-14 | End: 2025-07-20

## 2025-07-18 RX ADMIN — TRIAMCINOLONE ACETONIDE 80 MG: 40 INJECTION, SUSPENSION INTRA-ARTICULAR; INTRAMUSCULAR at 17:44

## 2025-07-18 RX ADMIN — LIDOCAINE HYDROCHLORIDE 2 ML: 10 INJECTION, SOLUTION EPIDURAL; INFILTRATION; INTRACAUDAL; PERINEURAL at 17:44

## 2025-07-20 PROCEDURE — 87186 SC STD MICRODIL/AGAR DIL: CPT | Performed by: NURSE PRACTITIONER

## 2025-07-20 PROCEDURE — 87086 URINE CULTURE/COLONY COUNT: CPT | Performed by: NURSE PRACTITIONER

## 2025-07-20 PROCEDURE — 87077 CULTURE AEROBIC IDENTIFY: CPT | Performed by: NURSE PRACTITIONER

## 2025-07-20 RX ORDER — TRIAMCINOLONE ACETONIDE 40 MG/ML
80 INJECTION, SUSPENSION INTRA-ARTICULAR; INTRAMUSCULAR
Status: COMPLETED | OUTPATIENT
Start: 2025-07-18 | End: 2025-07-18

## 2025-07-20 RX ORDER — LIDOCAINE HYDROCHLORIDE 10 MG/ML
2 INJECTION, SOLUTION EPIDURAL; INFILTRATION; INTRACAUDAL; PERINEURAL
Status: COMPLETED | OUTPATIENT
Start: 2025-07-18 | End: 2025-07-18

## 2025-07-20 NOTE — PROGRESS NOTES
"INJECTION VISIT    Patient: Krystyna Fabian    YOB: 1950    MRN: [id number]    Chief Complaint   Patient presents with    Right Knee - Follow-up             History of Present Illness:   Krystyna Fabian is a 75 y.o. year old who presents today for injection of the right knee.         Allergies:   Allergies   Allergen Reactions    Butorphanol Itching     Stadol  (felt like \"bugs were crawling all over me\")       Medications:   Home Medications:  Current Outpatient Medications on File Prior to Visit   Medication Sig    Ascorbic Acid (VITAMIN C PO) Take 1 capsule by mouth Daily.    docusate sodium 100 MG capsule Take 1 capsule by mouth Every Night.    Multiple Vitamins-Minerals (ZINC PO) Take  by mouth.    omeprazole (priLOSEC) 40 MG capsule TAKE 1 CAPSULE BY MOUTH TWICE  DAILY    triamcinolone (KENALOG) 0.1 % cream Apply 1 Application topically to the appropriate area as directed 2 (Two) Times a Day.    Turmeric 500 MG capsule Take  by mouth Daily.    zolpidem (AMBIEN) 10 MG tablet TAKE 1 TABLET BY MOUTH AT NIGHT AS NEEDED FOR SLEEP    [DISCONTINUED] baclofen (LIORESAL) 10 MG tablet Take 1 tablet by mouth Every Night.    [DISCONTINUED] gabapentin (NEURONTIN) 100 MG capsule Take 1 capsule 3 times a day by oral route as directed for 30 days, for spinal/nerve pain.    [DISCONTINUED] oxyCODONE-acetaminophen (PERCOCET) 7.5-325 MG per tablet Take 1 tablet by mouth.    [DISCONTINUED] pantoprazole (PROTONIX) 40 MG EC tablet Take 1 tablet by mouth 2 (Two) Times a Day.    [DISCONTINUED] pregabalin (LYRICA) 100 MG capsule Take 1 capsule by mouth 2 (Two) Times a Day.    [DISCONTINUED] tiZANidine (ZANAFLEX) 4 MG tablet Take 1 tablet by mouth Every 8 (Eight) Hours As Needed for Muscle Spasms.    [DISCONTINUED] VITAMIN D, CHOLECALCIFEROL, PO Take 1 tablet by mouth Daily.    QUEtiapine (SEROquel) 50 MG tablet Take 2 tablets by mouth every night at bedtime for 90 days.     No current facility-administered " medications on file prior to visit.         I have reviewed the patient's medical history in detail and updated the computerized patient record.  Review and summarization of old records include:    Past Medical History:   Diagnosis Date    Acute bronchitis     Allergic rhinitis     Anxiety     Arthritis     knee and neck    Autoimmune disease     no difinitive dx    B12 deficiency     Basal cell carcinoma (BCC) of back     chest and neck    Bursitis of both hips     C. difficile colitis 2013    Cancer     Carpal tunnel syndrome     right    Cholelithiasis 2004    Removed    Clostridium difficile colitis 2013    DDD (degenerative disc disease), lumbar     Difficulty walking     Fibromyalgia     Fracture of wrist 2016    Left wrist    GERD (gastroesophageal reflux disease)     Hx of migraine headaches     once- with erythromycin    Hypotension     IBS (irritable bowel syndrome)     IBS (irritable bowel syndrome)     Insomnia     Knee swelling 2024    Leg cramps     Paresthesia     left leg between knee and ankle    Peripheral edema     Plantar fasciitis April 2022    Pulmonary hypertension     2017 pressure 30-40-sleep study negative    Stress fracture 2022    Right foot     Past Surgical History:   Procedure Laterality Date    ABDOMINOPLASTY      BACK SURGERY  2016    BACK SURGERY  03/04/2025    CHOLECYSTECTOMY      COLONOSCOPY      2013    COLONOSCOPY N/A 11/04/2024    Procedure: COLONOSCOPY with rectal biopsy;  Surgeon: Eugenio Rose MD;  Location: [facility] ENDOSCOPY;  Service: General;  Laterality: N/A;    EPIDURAL      FOOT SURGERY  11/2020    FRACTURE SURGERY  2017    Left wrist    HAND SURGERY  2023    Carpal tunnel    LUMBAR DISC SURGERY      anterior    LUMBAR SPINE SURGERY      fx spine with cement and cage    LUMBAR SPINE SURGERY      second surgery due to fall    PLANTAR FASCIA RELEASE Right 11/09/2020    Procedure: ENDOSCOPIC PLANTAR FASCIOTOMY;  Surgeon: GERRI Ro DPM;  Location: [facility] MAIN OR;   Service: Podiatry;  Laterality: Right;    PLANTAR FASCIA RELEASE Left 09/09/2022    Procedure: PLANTAR FASCIOTOMY;  Surgeon: GERRI Ro DPM;  Location: [facility] MAIN OR;  Service: Podiatry;  Laterality: Left;    THIGH LIFT      TOENAIL EXCISION Left 09/09/2022    Procedure: NAIL BED REMOVAL/REVISION;  Surgeon: GERRI Ro DPM;  Location: [facility] MAIN OR;  Service: Podiatry;  Laterality: Left;    TRIGGER POINT INJECTION  2021    TUBAL ABDOMINAL LIGATION       Social History     Occupational History    Not on file   Tobacco Use    Smoking status: Never     Passive exposure: Never    Smokeless tobacco: Never   Vaping Use    Vaping status: Never Used   Substance and Sexual Activity    Alcohol use: Never    Drug use: Never    Sexual activity: Not Currently     Birth control/protection: Abstinence     Comment: Single      Social History     Social History Narrative    Not on file     Family History   Problem Relation Age of Onset    Osteoarthritis Mother     Breast cancer Mother     Cancer Mother         Breast cancer    Kidney disease Mother     Arthritis Mother     Diabetes Sister     Arthritis Other     Cancer Other     Obesity Other     Kidney disease Other     Heart disease Other     Stomach cancer Father     Cancer Father     Heart disease Maternal Grandmother     Diabetes Paternal Grandfather     Vision loss Paternal Grandmother                ROS  Negative unless listed in the HPI        Physical Exam  75 y.o. female  Body mass index is 31 kg/m²., 79.4 kg (175 lb)  Vitals:    07/18/25 1529   Resp: 20   SpO2: 98%     General: Alert, cooperative, appears well and in no observable distress.   HEENT: Normocephalic, atraumatic on external visual inspection. No icterus.   CV: No significant peripheral edema.   Respiratory: Normal respiratory effort.   Skin: Warm & well perfused; appropriate skin turgor.  Psych: Appropriate mood & affect.  Neuro: Gross sensation and motor intact in affected  extremity/extremities.  Vascular: Peripheral pulses palpable in affected extremity/extremities.         Procedure:  Large Joint Arthrocentesis: R knee  Date/Time: 7/18/2025 5:44 PM  Consent given by: patient  Site marked: site marked  Timeout: Immediately prior to procedure a time out was called to verify the correct patient, procedure, equipment, support staff and site/side marked as required   Supporting Documentation  Indications: pain and diagnostic evaluation   Procedure Details  Location: knee - R knee  Preparation: Patient was prepped and draped in the usual sterile fashion  Needle size: 25 G  Approach: anterolateral  Medications administered: 2 mL lidocaine PF 1% 1 %; 80 mg triamcinolone acetonide 40 MG/ML  Patient tolerance: patient tolerated the procedure well with no immediate complications            Assessment:   Diagnoses and all orders for this visit:    1. Primary osteoarthritis of right knee (Primary)    Other orders  -     Large Joint Arthrocentesis      Body mass index is 31 kg/m².  BMI consistent with Obese Class I: 30-34.9kg/m2        Plan:   -     Risks and benefits of injection therapy discussed with patient.  Possibility of bruising, pain, swelling, infection, increased blood sugar levels, cortisol flare and soft tissue atrophy discussed in detail.  Injected patient's right knee joint(s)with Kenalog from an anterolateral approach   Compression/brace   Rest, ice, compression, and elevation (RICE) therapy  OTC Tylenol for pain PRN  Follow up in 3 months or PRN for repeat injection   Please call with questions or concerns in the interim        Date of encounter: 07/18/2025   RADHA Lopez

## 2025-07-22 ENCOUNTER — RESULTS FOLLOW-UP (OUTPATIENT)
Dept: URGENT CARE | Facility: CLINIC | Age: 75
End: 2025-07-22
Payer: MEDICARE

## 2025-07-23 NOTE — TELEPHONE ENCOUNTER
Caller: Krystyna Fabian    Relationship to patient: Self    Best call back number: [phone]    Patient is needing:   PATIENT IS WANTING TO KNOW IF SHE NEEDS TO STOP TAKING ANTIBIOTIC.     PLEASE CALL TO DISCUSS.

## 2025-07-24 DIAGNOSIS — F51.01 PRIMARY INSOMNIA: ICD-10-CM

## 2025-07-24 RX ORDER — ZOLPIDEM TARTRATE 10 MG/1
10 TABLET ORAL NIGHTLY PRN
Qty: 30 TABLET | Refills: 0 | Status: SHIPPED | OUTPATIENT
Start: 2025-07-24

## 2025-08-04 ENCOUNTER — TELEPHONE (OUTPATIENT)
Dept: FAMILY MEDICINE CLINIC | Facility: CLINIC | Age: 75
End: 2025-08-04
Payer: MEDICARE

## 2025-08-04 RX ORDER — POTASSIUM CHLORIDE 1500 MG/1
20 TABLET, EXTENDED RELEASE ORAL 3 TIMES DAILY
Qty: 270 TABLET | Refills: 0 | Status: SHIPPED | OUTPATIENT
Start: 2025-08-04 | End: 2025-11-02

## 2025-08-05 ENCOUNTER — HOSPITAL ENCOUNTER (EMERGENCY)
Facility: HOSPITAL | Age: 75
Discharge: HOME | End: 2025-08-05
Attending: EMERGENCY MEDICINE | Admitting: EMERGENCY MEDICINE
Payer: MEDICARE

## 2025-08-05 ENCOUNTER — APPOINTMENT (OUTPATIENT)
Dept: GENERAL RADIOLOGY | Facility: HOSPITAL | Age: 75
End: 2025-08-05
Payer: MEDICARE

## 2025-08-05 VITALS
HEART RATE: 76 BPM | BODY MASS INDEX: 30.31 KG/M2 | OXYGEN SATURATION: 99 % | WEIGHT: 171.08 LBS | SYSTOLIC BLOOD PRESSURE: 121 MMHG | TEMPERATURE: 97.5 F | HEIGHT: 63 IN | RESPIRATION RATE: 20 BRPM | DIASTOLIC BLOOD PRESSURE: 94 MMHG

## 2025-08-05 DIAGNOSIS — R06.00 DYSPNEA, UNSPECIFIED TYPE: Primary | ICD-10-CM

## 2025-08-05 DIAGNOSIS — J06.9 VIRAL URI: ICD-10-CM

## 2025-08-05 LAB
ANION GAP SERPL CALCULATED.3IONS-SCNC: 12.5 MMOL/L (ref 5–15)
B PARAPERT DNA SPEC QL NAA+PROBE: NOT DETECTED
B PERT DNA SPEC QL NAA+PROBE: NOT DETECTED
BASOPHILS # BLD AUTO: 0.05 10*3/MM3 (ref 0–0.2)
BASOPHILS NFR BLD AUTO: 0.4 % (ref 0–1.5)
BUN SERPL-MCNC: 16.1 MG/DL (ref 8–23)
BUN/CREAT SERPL: 15.8 (ref 7–25)
C PNEUM DNA NPH QL NAA+NON-PROBE: NOT DETECTED
CALCIUM SPEC-SCNC: 9.4 MG/DL (ref 8.6–10.5)
CHLORIDE SERPL-SCNC: 110 MMOL/L (ref 98–107)
CO2 SERPL-SCNC: 16.5 MMOL/L (ref 22–29)
CREAT SERPL-MCNC: 1.02 MG/DL (ref 0.57–1)
DEPRECATED RDW RBC AUTO: 53.5 FL (ref 37–54)
EGFRCR SERPLBLD CKD-EPI 2021: 58.2 ML/MIN/1.73
EOSINOPHIL # BLD AUTO: 0.13 10*3/MM3 (ref 0–0.4)
EOSINOPHIL NFR BLD AUTO: 1 % (ref 0.3–6.2)
ERYTHROCYTE [DISTWIDTH] IN BLOOD BY AUTOMATED COUNT: 16.8 % (ref 12.3–15.4)
FLUAV SUBTYP SPEC NAA+PROBE: NOT DETECTED
FLUBV RNA NPH QL NAA+NON-PROBE: NOT DETECTED
GLUCOSE SERPL-MCNC: 80 MG/DL (ref 65–99)
HADV DNA SPEC NAA+PROBE: NOT DETECTED
HCOV 229E RNA SPEC QL NAA+PROBE: NOT DETECTED
HCOV HKU1 RNA SPEC QL NAA+PROBE: NOT DETECTED
HCOV NL63 RNA SPEC QL NAA+PROBE: NOT DETECTED
HCOV OC43 RNA SPEC QL NAA+PROBE: NOT DETECTED
HCT VFR BLD AUTO: 45.1 % (ref 34–46.6)
HGB BLD-MCNC: 14.1 G/DL (ref 12–15.9)
HMPV RNA NPH QL NAA+NON-PROBE: NOT DETECTED
HPIV1 RNA ISLT QL NAA+PROBE: NOT DETECTED
HPIV2 RNA SPEC QL NAA+PROBE: NOT DETECTED
HPIV3 RNA NPH QL NAA+PROBE: NOT DETECTED
HPIV4 P GENE NPH QL NAA+PROBE: NOT DETECTED
IMM GRANULOCYTES # BLD AUTO: 0.06 10*3/MM3 (ref 0–0.05)
IMM GRANULOCYTES NFR BLD AUTO: 0.5 % (ref 0–0.5)
LYMPHOCYTES # BLD AUTO: 2.32 10*3/MM3 (ref 0.7–3.1)
LYMPHOCYTES NFR BLD AUTO: 18.5 % (ref 19.6–45.3)
M PNEUMO IGG SER IA-ACNC: NOT DETECTED
MCH RBC QN AUTO: 27.2 PG (ref 26.6–33)
MCHC RBC AUTO-ENTMCNC: 31.3 G/DL (ref 31.5–35.7)
MCV RBC AUTO: 87.1 FL (ref 79–97)
MONOCYTES # BLD AUTO: 1.16 10*3/MM3 (ref 0.1–0.9)
MONOCYTES NFR BLD AUTO: 9.2 % (ref 5–12)
NEUTROPHILS NFR BLD AUTO: 70.4 % (ref 42.7–76)
NEUTROPHILS NFR BLD AUTO: 8.84 10*3/MM3 (ref 1.7–7)
NRBC BLD AUTO-RTO: 0 /100 WBC (ref 0–0.2)
PLATELET # BLD AUTO: 304 10*3/MM3 (ref 140–450)
PMV BLD AUTO: 10.2 FL (ref 6–12)
POTASSIUM SERPL-SCNC: 4 MMOL/L (ref 3.5–5.2)
RBC # BLD AUTO: 5.18 10*6/MM3 (ref 3.77–5.28)
RHINOVIRUS RNA SPEC NAA+PROBE: DETECTED
RSV RNA NPH QL NAA+NON-PROBE: NOT DETECTED
SARS-COV-2 RNA RESP QL NAA+PROBE: NOT DETECTED
SODIUM SERPL-SCNC: 139 MMOL/L (ref 136–145)
WBC NRBC COR # BLD AUTO: 12.56 10*3/MM3 (ref 3.4–10.8)

## 2025-08-05 PROCEDURE — 71045 X-RAY EXAM CHEST 1 VIEW: CPT

## 2025-08-05 PROCEDURE — 99284 EMERGENCY DEPT VISIT MOD MDM: CPT

## 2025-08-05 PROCEDURE — 93005 ELECTROCARDIOGRAM TRACING: CPT

## 2025-08-05 PROCEDURE — 80048 BASIC METABOLIC PNL TOTAL CA: CPT | Performed by: EMERGENCY MEDICINE

## 2025-08-05 PROCEDURE — 93005 ELECTROCARDIOGRAM TRACING: CPT | Performed by: EMERGENCY MEDICINE

## 2025-08-05 PROCEDURE — 0202U NFCT DS 22 TRGT SARS-COV-2: CPT | Performed by: EMERGENCY MEDICINE

## 2025-08-05 PROCEDURE — 85025 COMPLETE CBC W/AUTO DIFF WBC: CPT | Performed by: EMERGENCY MEDICINE

## 2025-08-05 RX ORDER — PREDNISONE 20 MG/1
20 TABLET ORAL DAILY
Qty: 5 TABLET | Refills: 0 | Status: SHIPPED | OUTPATIENT
Start: 2025-08-05

## 2025-08-05 RX ORDER — SODIUM CHLORIDE 0.9 % (FLUSH) 0.9 %
10 SYRINGE (ML) INJECTION AS NEEDED
Status: DISCONTINUED | OUTPATIENT
Start: 2025-08-05 | End: 2025-08-05 | Stop reason: HOSPADM

## 2025-08-06 ENCOUNTER — OFFICE VISIT (OUTPATIENT)
Dept: ORTHOPEDIC SURGERY | Facility: CLINIC | Age: 75
End: 2025-08-06
Payer: MEDICARE

## 2025-08-06 VITALS — HEIGHT: 63 IN | BODY MASS INDEX: 30.3 KG/M2 | OXYGEN SATURATION: 99 % | RESPIRATION RATE: 20 BRPM | WEIGHT: 171 LBS

## 2025-08-06 DIAGNOSIS — G89.29 CHRONIC PAIN OF RIGHT KNEE: Primary | ICD-10-CM

## 2025-08-06 DIAGNOSIS — M25.561 CHRONIC PAIN OF RIGHT KNEE: Primary | ICD-10-CM

## 2025-08-06 LAB
QT INTERVAL: 365 MS
QTC INTERVAL: 451 MS

## 2025-08-08 ENCOUNTER — PATIENT MESSAGE (OUTPATIENT)
Age: 75
End: 2025-08-08
Payer: MEDICARE

## 2025-08-08 ENCOUNTER — RESULTS FOLLOW-UP (OUTPATIENT)
Dept: FAMILY MEDICINE CLINIC | Facility: CLINIC | Age: 75
End: 2025-08-08

## 2025-08-08 ENCOUNTER — OFFICE VISIT (OUTPATIENT)
Dept: FAMILY MEDICINE CLINIC | Facility: CLINIC | Age: 75
End: 2025-08-08
Payer: MEDICARE

## 2025-08-08 ENCOUNTER — HOSPITAL ENCOUNTER (OUTPATIENT)
Dept: CT IMAGING | Facility: HOSPITAL | Age: 75
Discharge: HOME | End: 2025-08-08
Payer: MEDICARE

## 2025-08-08 VITALS
OXYGEN SATURATION: 98 % | SYSTOLIC BLOOD PRESSURE: 128 MMHG | HEIGHT: 63 IN | WEIGHT: 171 LBS | HEART RATE: 83 BPM | BODY MASS INDEX: 30.3 KG/M2 | DIASTOLIC BLOOD PRESSURE: 80 MMHG

## 2025-08-08 DIAGNOSIS — R06.09 DYSPNEA ON EXERTION: ICD-10-CM

## 2025-08-08 DIAGNOSIS — B34.8 RHINOVIRUS: ICD-10-CM

## 2025-08-08 DIAGNOSIS — R07.9 CHEST PAIN, UNSPECIFIED TYPE: ICD-10-CM

## 2025-08-08 DIAGNOSIS — R07.9 CHEST PAIN, UNSPECIFIED TYPE: Primary | ICD-10-CM

## 2025-08-08 LAB
CREAT BLDA-MCNC: 1 MG/DL (ref 0.6–1.3)
EGFRCR SERPLBLD CKD-EPI 2021: 59.6 ML/MIN/1.73

## 2025-08-08 PROCEDURE — 25510000001 IOPAMIDOL PER 1 ML: Performed by: NURSE PRACTITIONER

## 2025-08-08 PROCEDURE — 71275 CT ANGIOGRAPHY CHEST: CPT

## 2025-08-08 PROCEDURE — 82565 ASSAY OF CREATININE: CPT

## 2025-08-08 RX ORDER — IOPAMIDOL 755 MG/ML
100 INJECTION, SOLUTION INTRAVASCULAR
Status: COMPLETED | OUTPATIENT
Start: 2025-08-08 | End: 2025-08-08

## 2025-08-08 RX ORDER — ALBUTEROL SULFATE 90 UG/1
2 INHALANT RESPIRATORY (INHALATION) EVERY 4 HOURS PRN
Qty: 8 G | Refills: 1 | Status: SHIPPED | OUTPATIENT
Start: 2025-08-08

## 2025-08-08 RX ADMIN — IOPAMIDOL 100 ML: 755 INJECTION, SOLUTION INTRAVENOUS at 10:33

## 2025-08-18 ENCOUNTER — RESULTS FOLLOW-UP (OUTPATIENT)
Dept: ORTHOPEDIC SURGERY | Facility: CLINIC | Age: 75
End: 2025-08-18
Payer: MEDICARE

## 2025-08-19 ENCOUNTER — OFFICE VISIT (OUTPATIENT)
Dept: ORTHOPEDIC SURGERY | Facility: CLINIC | Age: 75
End: 2025-08-19
Payer: MEDICARE

## 2025-08-19 VITALS — RESPIRATION RATE: 20 BRPM | WEIGHT: 171 LBS | OXYGEN SATURATION: 99 % | HEIGHT: 63 IN | BODY MASS INDEX: 30.3 KG/M2

## 2025-08-19 DIAGNOSIS — S83.231D COMPLEX TEAR OF MEDIAL MENISCUS OF RIGHT KNEE AS CURRENT INJURY, SUBSEQUENT ENCOUNTER: Primary | ICD-10-CM

## 2025-08-19 DIAGNOSIS — M94.261 CHONDROMALACIA, RIGHT KNEE: ICD-10-CM

## 2025-08-19 DIAGNOSIS — M17.11 PRIMARY OSTEOARTHRITIS OF RIGHT KNEE: ICD-10-CM

## 2025-08-20 DIAGNOSIS — F51.01 PRIMARY INSOMNIA: ICD-10-CM

## 2025-08-21 RX ORDER — ZOLPIDEM TARTRATE 10 MG/1
10 TABLET ORAL NIGHTLY PRN
Qty: 30 TABLET | Refills: 1 | Status: SHIPPED | OUTPATIENT
Start: 2025-08-21

## (undated) DEVICE — BANDAGE,GAUZE,CONFORMING,1"X75",STRL,LF: Brand: MEDLINE

## (undated) DEVICE — GAUZE,SPONGE,FLUFF,6"X6.75",STRL,5/TRAY: Brand: MEDLINE

## (undated) DEVICE — APPL COTN TP PLSTC 6IN STRL LF PK/2

## (undated) DEVICE — BANDAGE,GAUZE,BULKEE II,4.5"X4.1YD,STRL: Brand: MEDLINE

## (undated) DEVICE — PK ENDO GI 50

## (undated) DEVICE — PAD UNDERCAST WYTEX 4IN 4YD LF STRL

## (undated) DEVICE — GLV SURG BIOGEL M LTX PF 7 1/2

## (undated) DEVICE — HOOK/ TRIANGLE BLADE SET: Brand: ENDOTRAC

## (undated) DEVICE — SINGLE-USE BIOPSY FORCEPS: Brand: RADIAL JAW 4

## (undated) DEVICE — DRN PENRS 1/4X12IN

## (undated) DEVICE — NDL HYPO PRECISIONGLIDE/REG 18G 1IN PNK

## (undated) DEVICE — KT SURG TURNOVER 050

## (undated) DEVICE — CUFF TOURNI 1BLADDER 1PRT 18IN STRL

## (undated) DEVICE — Device

## (undated) DEVICE — UNDERGLV SURG BIOGEL INDICAT PI SZ8 BLU

## (undated) DEVICE — SUT ETHLN 3/0 FS1 30IN 669H

## (undated) DEVICE — GOWN,REINFORCE,POLY,SIRUS,BREATH SLV,XLG: Brand: MEDLINE

## (undated) DEVICE — INTENDED FOR TISSUE SEPARATION, AND OTHER PROCEDURES THAT REQUIRE A SHARP SURGICAL BLADE TO PUNCTURE OR CUT.: Brand: BARD-PARKER ® CARBON RIB-BACK BLADES

## (undated) DEVICE — EPF KIT: Brand: ENDOTRAC

## (undated) DEVICE — SPLNT 1 STEP 4INX15IN

## (undated) DEVICE — SPLNT 1 STEP 4INX30IN

## (undated) DEVICE — OCCLUSIVE GAUZE STRIP OVERWRAP,3% BISMUTH TRIBROMOPHENATE IN PETROLATUM BLEND: Brand: XEROFORM

## (undated) DEVICE — DRAPE SHEET ULTRAGARD: Brand: MEDLINE

## (undated) DEVICE — TOWEL,OR,DSP,ST,WHITE,DLX,XR,4/PK,20PK/C: Brand: MEDLINE

## (undated) DEVICE — PK EXTREM 50

## (undated) DEVICE — CUFF TOURNI 1BLADDER 1PRT 12IN STRL

## (undated) DEVICE — SOLUTION,WATER,IRRIGATION,1000ML,STERILE: Brand: MEDLINE

## (undated) DEVICE — PENCL HND ROCKRSWTCH HOLSTR EZ CLEAN TP CRD 10FT

## (undated) DEVICE — SOL IRRIG H2O 1000ML STRL

## (undated) DEVICE — TOWEL,OR,DSP,ST,WHITE,DLX,4/PK,20PK/CS: Brand: MEDLINE

## (undated) DEVICE — BNDG ESMARK 4IN 12FT LF STRL BLU

## (undated) DEVICE — HOOK/TRIANGLE BLADE KIT: Brand: ENDOTRAC

## (undated) DEVICE — GOWN,REINFORCE,POLY,SIRUS,BREATH SLV,LG: Brand: MEDLINE